# Patient Record
Sex: MALE | Race: WHITE | NOT HISPANIC OR LATINO | URBAN - METROPOLITAN AREA
[De-identification: names, ages, dates, MRNs, and addresses within clinical notes are randomized per-mention and may not be internally consistent; named-entity substitution may affect disease eponyms.]

---

## 2017-11-10 ENCOUNTER — EMERGENCY (EMERGENCY)
Facility: HOSPITAL | Age: 47
LOS: 1 days | Discharge: HOME | End: 2017-11-10
Attending: EMERGENCY MEDICINE | Admitting: EMERGENCY MEDICINE
Payer: COMMERCIAL

## 2017-11-10 VITALS
SYSTOLIC BLOOD PRESSURE: 148 MMHG | OXYGEN SATURATION: 96 % | DIASTOLIC BLOOD PRESSURE: 92 MMHG | WEIGHT: 244.93 LBS | HEART RATE: 95 BPM | RESPIRATION RATE: 15 BRPM | HEIGHT: 75 IN | TEMPERATURE: 98 F

## 2017-11-10 PROCEDURE — 99284 EMERGENCY DEPT VISIT MOD MDM: CPT | Mod: 25

## 2017-11-10 PROCEDURE — 72125 CT NECK SPINE W/O DYE: CPT

## 2017-11-10 PROCEDURE — 70450 CT HEAD/BRAIN W/O DYE: CPT

## 2017-11-10 PROCEDURE — 99284 EMERGENCY DEPT VISIT MOD MDM: CPT

## 2017-11-10 PROCEDURE — 70450 CT HEAD/BRAIN W/O DYE: CPT | Mod: 26

## 2017-11-10 PROCEDURE — 72125 CT NECK SPINE W/O DYE: CPT | Mod: 26

## 2017-11-10 RX ORDER — IBUPROFEN 200 MG
600 TABLET ORAL ONCE
Qty: 0 | Refills: 0 | Status: COMPLETED | OUTPATIENT
Start: 2017-11-10 | End: 2017-11-10

## 2017-11-10 RX ORDER — CYCLOBENZAPRINE HYDROCHLORIDE 10 MG/1
1 TABLET, FILM COATED ORAL
Qty: 20 | Refills: 0 | OUTPATIENT
Start: 2017-11-10

## 2017-11-10 RX ADMIN — Medication 600 MILLIGRAM(S): at 15:43

## 2017-11-10 NOTE — ED PROVIDER NOTE - ENMT, MLM
Airway patent, Nasal mucosa clear. Mouth with normal mucosa. Throat has no vesicles, no oropharyngeal exudates and uvula is midline. no hemotympanum.

## 2017-11-10 NOTE — ED PROVIDER NOTE - CHPI ED SYMPTOMS NEG
no dizziness/no laceration/no loss of consciousness/visual changes, nausea, vomiting, abdominal pain, numbness/tingling
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
## 2017-11-10 NOTE — ED ADULT NURSE NOTE - CHPI ED SYMPTOMS NEG
no pain/no dizziness/no back pain/no crying/no decreased eating/drinking/no difficulty bearing weight/no disorientation/no fussiness/no sleeping issues/no bruising/no laceration/no loss of consciousness/no headache

## 2018-01-03 ENCOUNTER — APPOINTMENT (OUTPATIENT)
Dept: INTERNAL MEDICINE | Facility: CLINIC | Age: 48
End: 2018-01-03
Payer: COMMERCIAL

## 2018-01-03 VITALS
BODY MASS INDEX: 30.59 KG/M2 | HEART RATE: 80 BPM | OXYGEN SATURATION: 97 % | HEIGHT: 75 IN | WEIGHT: 246 LBS | RESPIRATION RATE: 16 BRPM | DIASTOLIC BLOOD PRESSURE: 80 MMHG | SYSTOLIC BLOOD PRESSURE: 112 MMHG | TEMPERATURE: 98.6 F

## 2018-01-03 DIAGNOSIS — M25.561 PAIN IN RIGHT KNEE: ICD-10-CM

## 2018-01-03 DIAGNOSIS — G89.29 PAIN IN RIGHT KNEE: ICD-10-CM

## 2018-01-03 PROCEDURE — 99214 OFFICE O/P EST MOD 30 MIN: CPT

## 2018-01-09 ENCOUNTER — LABORATORY RESULT (OUTPATIENT)
Age: 48
End: 2018-01-09

## 2018-01-12 DIAGNOSIS — E55.9 VITAMIN D DEFICIENCY, UNSPECIFIED: ICD-10-CM

## 2018-01-12 DIAGNOSIS — E86.0 DEHYDRATION: ICD-10-CM

## 2018-01-12 LAB
25(OH)D3 SERPL-MCNC: 15.8 NG/ML
ANION GAP SERPL CALC-SCNC: 14 MMOL/L
B BURGDOR AB SER-IMP: NEGATIVE
B BURGDOR IGM PATRN SER IB-IMP: POSITIVE
B BURGDOR18/20KD IGM SER QL IB: NORMAL
B BURGDOR18KD IGG SER QL IB: NORMAL
B BURGDOR23KD IGG SER QL IB: NORMAL
B BURGDOR23KD IGM SER QL IB: PRESENT
B BURGDOR28KD AB SER QL IB: NORMAL
B BURGDOR28KD IGG SER QL IB: NORMAL
B BURGDOR30KD AB SER QL IB: NORMAL
B BURGDOR30KD IGG SER QL IB: NORMAL
B BURGDOR31KD IGG SER QL IB: NORMAL
B BURGDOR31KD IGM SER QL IB: NORMAL
B BURGDOR39KD IGG SER QL IB: NORMAL
B BURGDOR39KD IGM SER QL IB: PRESENT
B BURGDOR41KD IGG SER QL IB: PRESENT
B BURGDOR41KD IGM SER QL IB: PRESENT
B BURGDOR45KD AB SER QL IB: NORMAL
B BURGDOR45KD IGG SER QL IB: NORMAL
B BURGDOR58KD AB SER QL IB: NORMAL
B BURGDOR58KD IGG SER QL IB: PRESENT
B BURGDOR66KD IGG SER QL IB: PRESENT
B BURGDOR66KD IGM SER QL IB: NORMAL
B BURGDOR93KD IGG SER QL IB: NORMAL
B BURGDOR93KD IGM SER QL IB: NORMAL
BASOPHILS # BLD AUTO: 0.06 K/UL
BASOPHILS NFR BLD AUTO: 0.9 %
BUN SERPL-MCNC: 32 MG/DL
CALCIUM SERPL-MCNC: 9.9 MG/DL
CHLORIDE SERPL-SCNC: 94 MMOL/L
CHOLEST SERPL-MCNC: 210 MG/DL
CHOLEST/HDLC SERPL: 4.5 RATIO
CK SERPL-CCNC: 186 U/L
CO2 SERPL-SCNC: 26 MMOL/L
CREAT SERPL-MCNC: 1.42 MG/DL
EBV EA AB SER IA-ACNC: 11.2 U/ML
EBV EA AB TITR SER IF: POSITIVE
EBV EA IGG SER QL IA: 29 U/ML
EBV EA IGG SER-ACNC: POSITIVE
EBV EA IGM SER IA-ACNC: NEGATIVE
EBV PATRN SPEC IB-IMP: NORMAL
EBV VCA IGG SER IA-ACNC: 356 U/ML
EBV VCA IGM SER QL IA: <10 U/ML
EOSINOPHIL # BLD AUTO: 0.08 K/UL
EOSINOPHIL NFR BLD AUTO: 1.2 %
EPSTEIN-BARR VIRUS CAPSID ANTIGEN IGG: POSITIVE
GLUCOSE SERPL-MCNC: 135 MG/DL
HCT VFR BLD CALC: 38.9 %
HDLC SERPL-MCNC: 47 MG/DL
HGB BLD-MCNC: 13.3 G/DL
IMM GRANULOCYTES NFR BLD AUTO: 0.4 %
IRON SATN MFR SERPL: 51 %
IRON SERPL-MCNC: 149 UG/DL
LDLC SERPL CALC-MCNC: 121 MG/DL
LYMPHOCYTES # BLD AUTO: 2.25 K/UL
LYMPHOCYTES NFR BLD AUTO: 32.9 %
MAN DIFF?: NORMAL
MCHC RBC-ENTMCNC: 32.8 PG
MCHC RBC-ENTMCNC: 34.2 GM/DL
MCV RBC AUTO: 95.8 FL
MONOCYTES # BLD AUTO: 0.5 K/UL
MONOCYTES NFR BLD AUTO: 7.3 %
NEUTROPHILS # BLD AUTO: 3.92 K/UL
NEUTROPHILS NFR BLD AUTO: 57.3 %
PLATELET # BLD AUTO: 222 K/UL
POTASSIUM SERPL-SCNC: 4.9 MMOL/L
RBC # BLD: 4.06 M/UL
RBC # FLD: 12.7 %
SODIUM SERPL-SCNC: 134 MMOL/L
TESTOST BND SERPL-MCNC: 1.6 PG/ML
TESTOST SERPL-MCNC: 89 NG/DL
TIBC SERPL-MCNC: 290 UG/DL
TRIGL SERPL-MCNC: 208 MG/DL
TSH SERPL-ACNC: 1.94 UIU/ML
UIBC SERPL-MCNC: 141 UG/DL
WBC # FLD AUTO: 6.84 K/UL

## 2018-01-12 RX ORDER — ERGOCALCIFEROL 1.25 MG/1
1.25 MG CAPSULE, LIQUID FILLED ORAL
Qty: 8 | Refills: 0 | Status: COMPLETED | COMMUNITY
Start: 1900-01-01 | End: 2018-02-09

## 2018-01-12 RX ORDER — DOXYCYCLINE HYCLATE 100 MG/1
100 TABLET ORAL TWICE DAILY
Qty: 42 | Refills: 0 | Status: COMPLETED | COMMUNITY
Start: 1900-01-01 | End: 2018-02-02

## 2018-02-08 LAB
ANION GAP SERPL CALC-SCNC: 13 MMOL/L
APPEARANCE: CLEAR
BACTERIA: ABNORMAL
BASOPHILS # BLD AUTO: 0.02 K/UL
BASOPHILS NFR BLD AUTO: 0.3 %
BILIRUBIN URINE: NEGATIVE
BLOOD URINE: NEGATIVE
BUN SERPL-MCNC: 12 MG/DL
CALCIUM SERPL-MCNC: 9.5 MG/DL
CHLORIDE SERPL-SCNC: 102 MMOL/L
CO2 SERPL-SCNC: 28 MMOL/L
COLOR: YELLOW
CREAT SERPL-MCNC: 0.93 MG/DL
EOSINOPHIL # BLD AUTO: 0.06 K/UL
EOSINOPHIL NFR BLD AUTO: 0.9 %
FERRITIN SERPL-MCNC: 553 NG/ML
GLUCOSE QUALITATIVE U: NEGATIVE MG/DL
GLUCOSE SERPL-MCNC: 108 MG/DL
HCT VFR BLD CALC: 39.8 %
HGB BLD-MCNC: 13 G/DL
HYALINE CASTS: 1 /LPF
IMM GRANULOCYTES NFR BLD AUTO: 0.3 %
IRON SATN MFR SERPL: 37 %
IRON SERPL-MCNC: 97 UG/DL
KETONES URINE: NEGATIVE
LEUKOCYTE ESTERASE URINE: NEGATIVE
LYMPHOCYTES # BLD AUTO: 2.5 K/UL
LYMPHOCYTES NFR BLD AUTO: 38.3 %
MAN DIFF?: NORMAL
MCHC RBC-ENTMCNC: 31.6 PG
MCHC RBC-ENTMCNC: 32.7 GM/DL
MCV RBC AUTO: 96.6 FL
MICROSCOPIC-UA: NORMAL
MONOCYTES # BLD AUTO: 0.35 K/UL
MONOCYTES NFR BLD AUTO: 5.4 %
NEUTROPHILS # BLD AUTO: 3.58 K/UL
NEUTROPHILS NFR BLD AUTO: 54.8 %
NITRITE URINE: NEGATIVE
PH URINE: 5.5
PLATELET # BLD AUTO: 172 K/UL
POTASSIUM SERPL-SCNC: 4.7 MMOL/L
PROTEIN URINE: NEGATIVE MG/DL
RBC # BLD: 4.12 M/UL
RBC # FLD: 13.1 %
RED BLOOD CELLS URINE: 1 /HPF
SODIUM SERPL-SCNC: 143 MMOL/L
SPECIFIC GRAVITY URINE: 1.02
SQUAMOUS EPITHELIAL CELLS: 0 /HPF
TIBC SERPL-MCNC: 262 UG/DL
UIBC SERPL-MCNC: 165 UG/DL
URINE COMMENTS: NORMAL
UROBILINOGEN URINE: NEGATIVE MG/DL
WBC # FLD AUTO: 6.53 K/UL
WHITE BLOOD CELLS URINE: 1 /HPF

## 2018-02-09 ENCOUNTER — APPOINTMENT (OUTPATIENT)
Dept: INTERNAL MEDICINE | Facility: CLINIC | Age: 48
End: 2018-02-09

## 2018-02-12 ENCOUNTER — LABORATORY RESULT (OUTPATIENT)
Age: 48
End: 2018-02-12

## 2018-02-12 ENCOUNTER — APPOINTMENT (OUTPATIENT)
Dept: INTERNAL MEDICINE | Facility: CLINIC | Age: 48
End: 2018-02-12
Payer: COMMERCIAL

## 2018-02-12 VITALS
BODY MASS INDEX: 29.52 KG/M2 | OXYGEN SATURATION: 99 % | RESPIRATION RATE: 16 BRPM | WEIGHT: 250 LBS | HEART RATE: 90 BPM | SYSTOLIC BLOOD PRESSURE: 130 MMHG | TEMPERATURE: 98 F | HEIGHT: 77 IN | DIASTOLIC BLOOD PRESSURE: 80 MMHG

## 2018-02-12 DIAGNOSIS — F51.01 PRIMARY INSOMNIA: ICD-10-CM

## 2018-02-12 DIAGNOSIS — Z80.9 FAMILY HISTORY OF MALIGNANT NEOPLASM, UNSPECIFIED: ICD-10-CM

## 2018-02-12 DIAGNOSIS — Z78.9 OTHER SPECIFIED HEALTH STATUS: ICD-10-CM

## 2018-02-12 DIAGNOSIS — D64.9 ANEMIA, UNSPECIFIED: ICD-10-CM

## 2018-02-12 DIAGNOSIS — Z80.41 FAMILY HISTORY OF MALIGNANT NEOPLASM OF OVARY: ICD-10-CM

## 2018-02-12 DIAGNOSIS — R23.8 OTHER SKIN CHANGES: ICD-10-CM
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
## 2018-02-12 DIAGNOSIS — Z80.3 FAMILY HISTORY OF MALIGNANT NEOPLASM OF BREAST: ICD-10-CM

## 2018-02-12 LAB — TRANSFERRIN SERPL-MCNC: 214 MG/DL

## 2018-02-12 PROCEDURE — 99214 OFFICE O/P EST MOD 30 MIN: CPT

## 2018-02-12 RX ORDER — LEVOFLOXACIN 500 MG/1
500 TABLET, FILM COATED ORAL
Qty: 7 | Refills: 0 | Status: COMPLETED | COMMUNITY
Start: 2017-09-18

## 2018-02-12 RX ORDER — HYDROCODONE BITARTRATE AND ACETAMINOPHEN 10; 325 MG/1; MG/1
10-325 TABLET ORAL
Qty: 60 | Refills: 0 | Status: COMPLETED | COMMUNITY
Start: 2017-11-13

## 2018-02-12 RX ORDER — VALSARTAN 320 MG/1
320 TABLET, COATED ORAL
Qty: 30 | Refills: 0 | Status: COMPLETED | COMMUNITY
Start: 2017-10-04

## 2018-02-12 RX ORDER — OXYCODONE 10 MG/1
10 TABLET ORAL
Qty: 120 | Refills: 0 | Status: COMPLETED | COMMUNITY
Start: 2017-10-25

## 2018-02-16 LAB
ALBUMIN SERPL ELPH-MCNC: 4.5 G/DL
ALP BLD-CCNC: 80 U/L
ALT SERPL-CCNC: 77 U/L
ANACR T: NEGATIVE
ANAPLASMA PHAGOCYTO IGM COMMENT: NORMAL
ANAPLASMA PHAGOCYTOPHILIA IGG ANTIBODIES: NORMAL
ANAPLASMA PHAGOCYTOPHILIA IGM ANTIBODIES: NORMAL
APTT BLD: 31.9 SEC
AST SERPL-CCNC: 76 U/L
B BURGDOR IGG+IGM SER QL IB: NORMAL
B MICROTI AB SER QL: NORMAL
BABESIA ANTIBODIES, IGG: NORMAL
BABESIA ANTIBODIES, IGM: NORMAL
BILIRUB DIRECT SERPL-MCNC: 0.3 MG/DL
BILIRUB INDIRECT SERPL-MCNC: 0.5 MG/DL
BILIRUB SERPL-MCNC: 0.8 MG/DL
CK SERPL-CCNC: 138 U/L
CRP SERPL-MCNC: 0.4 MG/DL
EHRLICIA CHAFFEENIS IGG ANTIBODIES: NORMAL
EHRLICIA CHAFFEENIS IGG COMMENT: NORMAL
EHRLICIA CHAFFEENIS IGG INTERP: NORMAL
EHRLICIA CHAFFEENIS IGM ANTIBODIES: NORMAL
ERYTHROCYTE [SEDIMENTATION RATE] IN BLOOD BY WESTERGREN METHOD: 15 MM/HR
FOLATE SERPL-MCNC: 10.9 NG/ML
INR PPP: 1.06 RATIO
PROT SERPL-MCNC: 7.3 G/DL
PT BLD: 12 SEC
RHEUMATOID FACT SER QL: 14 IU/ML
TESTOST BND SERPL-MCNC: 3.4 PG/ML
TESTOST SERPL-MCNC: 281.6 NG/DL
TSH SERPL-ACNC: 1.66 UIU/ML
URATE SERPL-MCNC: 8.3 MG/DL
VIT B12 SERPL-MCNC: 515 PG/ML

## 2018-02-21 DIAGNOSIS — K90.0 CELIAC DISEASE: ICD-10-CM

## 2018-03-01 LAB
ANNOTATION COMMENT IMP: NORMAL
HLA-DQ2: POSITIVE
HLA-DQ8 QL: NEGATIVE
REF LAB TEST METHOD: NORMAL

## 2018-03-15 ENCOUNTER — APPOINTMENT (OUTPATIENT)
Dept: UROLOGY | Facility: CLINIC | Age: 48
End: 2018-03-15
Payer: COMMERCIAL

## 2018-03-15 VITALS
DIASTOLIC BLOOD PRESSURE: 75 MMHG | TEMPERATURE: 97.7 F | BODY MASS INDEX: 30.46 KG/M2 | SYSTOLIC BLOOD PRESSURE: 110 MMHG | WEIGHT: 245 LBS | HEART RATE: 74 BPM | HEIGHT: 75 IN

## 2018-03-15 DIAGNOSIS — S89.90XA UNSPECIFIED INJURY OF UNSPECIFIED LOWER LEG, INITIAL ENCOUNTER: ICD-10-CM

## 2018-03-15 PROCEDURE — 99204 OFFICE O/P NEW MOD 45 MIN: CPT

## 2018-03-18 PROBLEM — S89.90XA KNEE INJURY: Status: RESOLVED | Noted: 2018-03-15 | Resolved: 2018-03-18

## 2018-03-23 LAB — PSA SERPL-MCNC: 0.21 NG/ML

## 2018-04-24 ENCOUNTER — APPOINTMENT (OUTPATIENT)
Dept: UROLOGY | Facility: CLINIC | Age: 48
End: 2018-04-24
Payer: COMMERCIAL

## 2018-04-24 PROCEDURE — 96372 THER/PROPH/DIAG INJ SC/IM: CPT

## 2018-04-24 RX ORDER — TESTOSTERONE CYPIONATE 200 MG/ML
200 INJECTION, SOLUTION INTRAMUSCULAR
Refills: 0 | Status: COMPLETED | OUTPATIENT
Start: 2018-04-24

## 2018-05-04 ENCOUNTER — EMERGENCY (EMERGENCY)
Facility: HOSPITAL | Age: 48
LOS: 0 days | Discharge: HOME | End: 2018-05-04
Attending: EMERGENCY MEDICINE
Payer: COMMERCIAL

## 2018-05-04 VITALS
SYSTOLIC BLOOD PRESSURE: 137 MMHG | DIASTOLIC BLOOD PRESSURE: 85 MMHG | RESPIRATION RATE: 16 BRPM | OXYGEN SATURATION: 100 % | TEMPERATURE: 98 F | HEART RATE: 93 BPM

## 2018-05-04 VITALS — WEIGHT: 240.08 LBS | HEIGHT: 75 IN

## 2018-05-04 DIAGNOSIS — Z11.4 ENCOUNTER FOR SCREENING FOR HUMAN IMMUNODEFICIENCY VIRUS [HIV]: ICD-10-CM

## 2018-05-04 DIAGNOSIS — Z88.0 ALLERGY STATUS TO PENICILLIN: ICD-10-CM

## 2018-05-04 DIAGNOSIS — R10.33 PERIUMBILICAL PAIN: ICD-10-CM

## 2018-05-04 DIAGNOSIS — R10.9 UNSPECIFIED ABDOMINAL PAIN: ICD-10-CM

## 2018-05-04 LAB
ALBUMIN SERPL ELPH-MCNC: 3.9 G/DL — SIGNIFICANT CHANGE UP (ref 3.3–5)
ALP SERPL-CCNC: 77 U/L — SIGNIFICANT CHANGE UP (ref 40–120)
ALT FLD-CCNC: 93 U/L — HIGH (ref 12–78)
ANION GAP SERPL CALC-SCNC: 12 MMOL/L — SIGNIFICANT CHANGE UP (ref 5–17)
APTT BLD: 32.5 SEC — SIGNIFICANT CHANGE UP (ref 27.5–37.4)
AST SERPL-CCNC: 107 U/L — HIGH (ref 15–37)
BASOPHILS # BLD AUTO: 0.06 K/UL — SIGNIFICANT CHANGE UP (ref 0–0.2)
BASOPHILS NFR BLD AUTO: 0.8 % — SIGNIFICANT CHANGE UP (ref 0–2)
BILIRUB SERPL-MCNC: 0.6 MG/DL — SIGNIFICANT CHANGE UP (ref 0.2–1.2)
BUN SERPL-MCNC: 20 MG/DL — SIGNIFICANT CHANGE UP (ref 7–23)
CALCIUM SERPL-MCNC: 8.8 MG/DL — SIGNIFICANT CHANGE UP (ref 8.5–10.1)
CHLORIDE SERPL-SCNC: 102 MMOL/L — SIGNIFICANT CHANGE UP (ref 96–108)
CO2 SERPL-SCNC: 26 MMOL/L — SIGNIFICANT CHANGE UP (ref 22–31)
CREAT SERPL-MCNC: 1.23 MG/DL — SIGNIFICANT CHANGE UP (ref 0.5–1.3)
EOSINOPHIL # BLD AUTO: 0.06 K/UL — SIGNIFICANT CHANGE UP (ref 0–0.5)
EOSINOPHIL NFR BLD AUTO: 0.8 % — SIGNIFICANT CHANGE UP (ref 0–6)
GLUCOSE SERPL-MCNC: 111 MG/DL — HIGH (ref 70–99)
HCT VFR BLD CALC: 39.5 % — SIGNIFICANT CHANGE UP (ref 39–50)
HGB BLD-MCNC: 13.4 G/DL — SIGNIFICANT CHANGE UP (ref 13–17)
HIV 1 & 2 AB SERPL IA.RAPID: SIGNIFICANT CHANGE UP
IMM GRANULOCYTES NFR BLD AUTO: 0.4 % — SIGNIFICANT CHANGE UP (ref 0–1.5)
INR BLD: 1.07 RATIO — SIGNIFICANT CHANGE UP (ref 0.88–1.16)
LIDOCAIN IGE QN: 198 U/L — SIGNIFICANT CHANGE UP (ref 73–393)
LYMPHOCYTES # BLD AUTO: 2.31 K/UL — SIGNIFICANT CHANGE UP (ref 1–3.3)
LYMPHOCYTES # BLD AUTO: 31.9 % — SIGNIFICANT CHANGE UP (ref 13–44)
MCHC RBC-ENTMCNC: 32.5 PG — SIGNIFICANT CHANGE UP (ref 27–34)
MCHC RBC-ENTMCNC: 33.9 GM/DL — SIGNIFICANT CHANGE UP (ref 32–36)
MCV RBC AUTO: 95.9 FL — SIGNIFICANT CHANGE UP (ref 80–100)
MONOCYTES # BLD AUTO: 0.65 K/UL — SIGNIFICANT CHANGE UP (ref 0–0.9)
MONOCYTES NFR BLD AUTO: 9 % — SIGNIFICANT CHANGE UP (ref 2–14)
NEUTROPHILS # BLD AUTO: 4.13 K/UL — SIGNIFICANT CHANGE UP (ref 1.8–7.4)
NEUTROPHILS NFR BLD AUTO: 57.1 % — SIGNIFICANT CHANGE UP (ref 43–77)
PLATELET # BLD AUTO: 153 K/UL — SIGNIFICANT CHANGE UP (ref 150–400)
POTASSIUM SERPL-MCNC: 4.1 MMOL/L — SIGNIFICANT CHANGE UP (ref 3.5–5.3)
POTASSIUM SERPL-SCNC: 4.1 MMOL/L — SIGNIFICANT CHANGE UP (ref 3.5–5.3)
PROT SERPL-MCNC: 7.6 GM/DL — SIGNIFICANT CHANGE UP (ref 6–8.3)
PROTHROM AB SERPL-ACNC: 11.6 SEC — SIGNIFICANT CHANGE UP (ref 9.8–12.7)
RBC # BLD: 4.12 M/UL — LOW (ref 4.2–5.8)
RBC # FLD: 12.5 % — SIGNIFICANT CHANGE UP (ref 10.3–14.5)
SODIUM SERPL-SCNC: 140 MMOL/L — SIGNIFICANT CHANGE UP (ref 135–145)
WBC # BLD: 7.24 K/UL — SIGNIFICANT CHANGE UP (ref 3.8–10.5)
WBC # FLD AUTO: 7.24 K/UL — SIGNIFICANT CHANGE UP (ref 3.8–10.5)

## 2018-05-04 PROCEDURE — 99285 EMERGENCY DEPT VISIT HI MDM: CPT | Mod: 25

## 2018-05-04 PROCEDURE — 74177 CT ABD & PELVIS W/CONTRAST: CPT | Mod: 26

## 2018-05-04 RX ORDER — HYDROMORPHONE HYDROCHLORIDE 2 MG/ML
1 INJECTION INTRAMUSCULAR; INTRAVENOUS; SUBCUTANEOUS ONCE
Qty: 0 | Refills: 0 | Status: DISCONTINUED | OUTPATIENT
Start: 2018-05-04 | End: 2018-05-04

## 2018-05-04 RX ORDER — ONDANSETRON 8 MG/1
4 TABLET, FILM COATED ORAL ONCE
Qty: 0 | Refills: 0 | Status: COMPLETED | OUTPATIENT
Start: 2018-05-04 | End: 2018-05-04

## 2018-05-04 RX ORDER — SODIUM CHLORIDE 9 MG/ML
1000 INJECTION INTRAMUSCULAR; INTRAVENOUS; SUBCUTANEOUS ONCE
Qty: 0 | Refills: 0 | Status: COMPLETED | OUTPATIENT
Start: 2018-05-04 | End: 2018-05-04

## 2018-05-04 RX ADMIN — SODIUM CHLORIDE 1000 MILLILITER(S): 9 INJECTION INTRAMUSCULAR; INTRAVENOUS; SUBCUTANEOUS at 19:16

## 2018-05-04 RX ADMIN — HYDROMORPHONE HYDROCHLORIDE 1 MILLIGRAM(S): 2 INJECTION INTRAMUSCULAR; INTRAVENOUS; SUBCUTANEOUS at 19:44

## 2018-05-04 NOTE — ED STATDOCS - ATTENDING CONTRIBUTION TO CARE
I, Leon Bergeron MD,  performed the initial face to face bedside interview with this patient regarding history of present illness, review of symptoms and relevant past medical, social and family history.  I completed an independent physical examination.  I was the initial provider who evaluated this patient. I have signed out the follow up of any pending tests (i.e. labs, radiological studies) to the ACP.  I have communicated the patient’s plan of care and disposition with the ACP.  The history, relevant review of systems, past medical and surgical history, medical decision making, and physical examination was documented by the scribe in my presence and I attest to the accuracy of the documentation.

## 2018-05-04 NOTE — ED STATDOCS - OBJECTIVE STATEMENT
48 y/o M with a PMHx of fatty liver, Lyme's Disease, RA, spine fused, umbilical hernia, testicular hernia (when he was a child) presents to the ED c/o +TTP abd pain for the last month now 2-3 days of tenderness that has been consistent, constant and worsening. Pt notes eating aggravates pain then will follow abd distention, nausea, gagging, vomiting. Pt notes he has had blood test for these Sx with no findings. Pt notes he took Oxy this morning. Denies hematuria, dysuria. 15 mg Oxy morning and 15mg at night although not persistent.  Denies trauma. Denies testicular pain, fevers, chills, CP and SOB. Non-smoker. GI  Surgeon

## 2018-05-04 NOTE — ED ADULT TRIAGE NOTE - CHIEF COMPLAINT QUOTE
Abdominal pain and bloating for 6 weeks. Patient believes it could be from his hernia repair. Denies fever or diarrhea. +vomiting. patient states he took oxycodone this morning for his neck pain he is scheduled for a neck fusion.

## 2018-05-04 NOTE — ED STATDOCS - PROGRESS NOTE DETAILS
signed Maggie Reaves PA-C Pt seen in initially in intake by Dr Bergeron.   Pt c/o abd bloating and pain after eating x 1 month. Worsening pain in periumbilical region x 3-4 days near pts umbilical hernia repair site from several years ago. Vomiting in mornings. Pt TTP periumbilical. Plan labs, CT. Pt declines analgesia initially. Pt agrees with plan of  care. signed Maggie Reaves PA-C   Unclear etiology of pts abdominal pain, does not appear to be infectious or surgical in nature at this time. Recommend outpt f/u with GI and Patane. Pt agrees with plan of  care.

## 2018-05-04 NOTE — ED STATDOCS - GASTROINTESTINAL, MLM
+tender to palpitation to superior umbilicus, periumbilical tenderness, no palpable hernia. no CVA tenderness. +tender to palpitation to superior umbilicus, periumbilical tenderness, no palpable hernia. soft, non-distended, no rebound or guard. no CVA tenderness.

## 2018-05-04 NOTE — ED STATDOCS - MEDICAL DECISION MAKING DETAILS
46 y/o M pmhx of umblical hernia repair x1 month abd distention x2-3 days of significant beronica umblical tenderness to obtain CT abd/pelvis to eval for obstruction, diverticulitis, appendicitis, or other intra abd pathology obtain labs including lipase, U/A and culture give IV fluids and Zofran, patient does not want pain medication at this time.

## 2018-05-14 ENCOUNTER — APPOINTMENT (OUTPATIENT)
Dept: INTERNAL MEDICINE | Facility: CLINIC | Age: 48
End: 2018-05-14
Payer: COMMERCIAL

## 2018-05-14 VITALS
TEMPERATURE: 98.6 F | SYSTOLIC BLOOD PRESSURE: 124 MMHG | WEIGHT: 256 LBS | OXYGEN SATURATION: 97 % | HEART RATE: 70 BPM | HEIGHT: 76 IN | BODY MASS INDEX: 31.17 KG/M2 | DIASTOLIC BLOOD PRESSURE: 80 MMHG | RESPIRATION RATE: 16 BRPM

## 2018-05-14 DIAGNOSIS — M54.12 RADICULOPATHY, CERVICAL REGION: ICD-10-CM

## 2018-05-14 DIAGNOSIS — R53.83 OTHER FATIGUE: ICD-10-CM

## 2018-05-14 DIAGNOSIS — M62.08 SEPARATION OF MUSCLE (NONTRAUMATIC), OTHER SITE: ICD-10-CM

## 2018-05-14 DIAGNOSIS — M25.50 PAIN IN UNSPECIFIED JOINT: ICD-10-CM

## 2018-05-14 DIAGNOSIS — A69.20 LYME DISEASE, UNSPECIFIED: ICD-10-CM

## 2018-05-14 PROCEDURE — 99213 OFFICE O/P EST LOW 20 MIN: CPT

## 2018-05-14 RX ORDER — HYDROCODONE BITARTRATE AND ACETAMINOPHEN 10; 325 MG/1; MG/1
10-325 TABLET ORAL
Refills: 0 | Status: COMPLETED | COMMUNITY
End: 2018-05-14

## 2018-05-14 RX ORDER — VALSARTAN AND HYDROCHLOROTHIAZIDE 320; 25 MG/1; MG/1
320-25 TABLET, FILM COATED ORAL DAILY
Refills: 0 | Status: COMPLETED | COMMUNITY
Start: 2017-10-26 | End: 2018-05-14

## 2018-05-15 ENCOUNTER — APPOINTMENT (OUTPATIENT)
Dept: UROLOGY | Facility: CLINIC | Age: 48
End: 2018-05-15
Payer: COMMERCIAL

## 2018-05-15 PROCEDURE — 96372 THER/PROPH/DIAG INJ SC/IM: CPT

## 2018-05-15 RX ORDER — TESTOSTERONE CYPIONATE 200 MG/ML
200 INJECTION, SOLUTION INTRAMUSCULAR
Refills: 0 | Status: COMPLETED | OUTPATIENT
Start: 2018-05-15

## 2018-05-15 RX ADMIN — TESTOSTERONE CYPIONATE 0 MG/ML: 200 INJECTION INTRAMUSCULAR at 00:00

## 2018-06-01 ENCOUNTER — INPATIENT (INPATIENT)
Facility: HOSPITAL | Age: 48
LOS: 0 days | Discharge: HOME | End: 2018-06-02
Attending: FAMILY MEDICINE | Admitting: FAMILY MEDICINE
Payer: COMMERCIAL

## 2018-06-01 VITALS — HEIGHT: 75 IN | WEIGHT: 250 LBS

## 2018-06-01 DIAGNOSIS — Z96.651 PRESENCE OF RIGHT ARTIFICIAL KNEE JOINT: Chronic | ICD-10-CM

## 2018-06-01 LAB
ALBUMIN SERPL ELPH-MCNC: 4.1 G/DL — SIGNIFICANT CHANGE UP (ref 3.3–5)
ALP SERPL-CCNC: 76 U/L — SIGNIFICANT CHANGE UP (ref 40–120)
ALT FLD-CCNC: 73 U/L — SIGNIFICANT CHANGE UP (ref 12–78)
AMMONIA BLD-MCNC: 15 UMOL/L — SIGNIFICANT CHANGE UP (ref 11–32)
AMPHET UR-MCNC: NEGATIVE — SIGNIFICANT CHANGE UP
ANION GAP SERPL CALC-SCNC: 8 MMOL/L — SIGNIFICANT CHANGE UP (ref 5–17)
APPEARANCE UR: CLEAR — SIGNIFICANT CHANGE UP
APTT BLD: 34.2 SEC — SIGNIFICANT CHANGE UP (ref 27.5–37.4)
AST SERPL-CCNC: 75 U/L — HIGH (ref 15–37)
BACTERIA # UR AUTO: ABNORMAL
BARBITURATES UR SCN-MCNC: NEGATIVE — SIGNIFICANT CHANGE UP
BASOPHILS # BLD AUTO: 0.05 K/UL — SIGNIFICANT CHANGE UP (ref 0–0.2)
BASOPHILS NFR BLD AUTO: 0.6 % — SIGNIFICANT CHANGE UP (ref 0–2)
BENZODIAZ UR-MCNC: NEGATIVE — SIGNIFICANT CHANGE UP
BILIRUB SERPL-MCNC: 1.2 MG/DL — SIGNIFICANT CHANGE UP (ref 0.2–1.2)
BILIRUB UR-MCNC: NEGATIVE — SIGNIFICANT CHANGE UP
BUN SERPL-MCNC: 15 MG/DL — SIGNIFICANT CHANGE UP (ref 7–23)
CALCIUM SERPL-MCNC: 9.3 MG/DL — SIGNIFICANT CHANGE UP (ref 8.5–10.1)
CHLORIDE SERPL-SCNC: 104 MMOL/L — SIGNIFICANT CHANGE UP (ref 96–108)
CO2 SERPL-SCNC: 26 MMOL/L — SIGNIFICANT CHANGE UP (ref 22–31)
COCAINE METAB.OTHER UR-MCNC: NEGATIVE — SIGNIFICANT CHANGE UP
COLOR SPEC: YELLOW — SIGNIFICANT CHANGE UP
COMMENT - URINE: SIGNIFICANT CHANGE UP
CREAT SERPL-MCNC: 1.04 MG/DL — SIGNIFICANT CHANGE UP (ref 0.5–1.3)
DIFF PNL FLD: NEGATIVE — SIGNIFICANT CHANGE UP
EOSINOPHIL # BLD AUTO: 0.04 K/UL — SIGNIFICANT CHANGE UP (ref 0–0.5)
EOSINOPHIL NFR BLD AUTO: 0.5 % — SIGNIFICANT CHANGE UP (ref 0–6)
EPI CELLS # UR: SIGNIFICANT CHANGE UP
ETHANOL SERPL-MCNC: <10 MG/DL — SIGNIFICANT CHANGE UP (ref 0–10)
GLUCOSE SERPL-MCNC: 108 MG/DL — HIGH (ref 70–99)
GLUCOSE UR QL: 100 MG/DL
HCT VFR BLD CALC: 41.9 % — SIGNIFICANT CHANGE UP (ref 39–50)
HGB BLD-MCNC: 14.6 G/DL — SIGNIFICANT CHANGE UP (ref 13–17)
IMM GRANULOCYTES NFR BLD AUTO: 0.4 % — SIGNIFICANT CHANGE UP (ref 0–1.5)
INR BLD: 1.14 RATIO — SIGNIFICANT CHANGE UP (ref 0.88–1.16)
KETONES UR-MCNC: NEGATIVE — SIGNIFICANT CHANGE UP
LEUKOCYTE ESTERASE UR-ACNC: ABNORMAL
LIDOCAIN IGE QN: 880 U/L — HIGH (ref 73–393)
LYMPHOCYTES # BLD AUTO: 1.84 K/UL — SIGNIFICANT CHANGE UP (ref 1–3.3)
LYMPHOCYTES # BLD AUTO: 22.8 % — SIGNIFICANT CHANGE UP (ref 13–44)
MCHC RBC-ENTMCNC: 33.6 PG — SIGNIFICANT CHANGE UP (ref 27–34)
MCHC RBC-ENTMCNC: 34.8 GM/DL — SIGNIFICANT CHANGE UP (ref 32–36)
MCV RBC AUTO: 96.5 FL — SIGNIFICANT CHANGE UP (ref 80–100)
METHADONE UR-MCNC: NEGATIVE — SIGNIFICANT CHANGE UP
MONOCYTES # BLD AUTO: 0.59 K/UL — SIGNIFICANT CHANGE UP (ref 0–0.9)
MONOCYTES NFR BLD AUTO: 7.3 % — SIGNIFICANT CHANGE UP (ref 2–14)
NEUTROPHILS # BLD AUTO: 5.51 K/UL — SIGNIFICANT CHANGE UP (ref 1.8–7.4)
NEUTROPHILS NFR BLD AUTO: 68.4 % — SIGNIFICANT CHANGE UP (ref 43–77)
NITRITE UR-MCNC: NEGATIVE — SIGNIFICANT CHANGE UP
NRBC # BLD: 0 /100 WBCS — SIGNIFICANT CHANGE UP (ref 0–0)
OPIATES UR-MCNC: POSITIVE — SIGNIFICANT CHANGE UP
PCP SPEC-MCNC: SIGNIFICANT CHANGE UP
PCP UR-MCNC: NEGATIVE — SIGNIFICANT CHANGE UP
PH UR: 8 — SIGNIFICANT CHANGE UP (ref 5–8)
PLATELET # BLD AUTO: 147 K/UL — LOW (ref 150–400)
POTASSIUM SERPL-MCNC: 4.3 MMOL/L — SIGNIFICANT CHANGE UP (ref 3.5–5.3)
POTASSIUM SERPL-SCNC: 4.3 MMOL/L — SIGNIFICANT CHANGE UP (ref 3.5–5.3)
PROT SERPL-MCNC: 7.8 GM/DL — SIGNIFICANT CHANGE UP (ref 6–8.3)
PROT UR-MCNC: NEGATIVE MG/DL — SIGNIFICANT CHANGE UP
PROTHROM AB SERPL-ACNC: 12.3 SEC — SIGNIFICANT CHANGE UP (ref 9.8–12.7)
RBC # BLD: 4.34 M/UL — SIGNIFICANT CHANGE UP (ref 4.2–5.8)
RBC # FLD: 13 % — SIGNIFICANT CHANGE UP (ref 10.3–14.5)
RBC CASTS # UR COMP ASSIST: ABNORMAL /HPF (ref 0–4)
SODIUM SERPL-SCNC: 138 MMOL/L — SIGNIFICANT CHANGE UP (ref 135–145)
SP GR SPEC: 1.01 — SIGNIFICANT CHANGE UP (ref 1.01–1.02)
THC UR QL: POSITIVE — SIGNIFICANT CHANGE UP
TSH SERPL-MCNC: 1.5 UU/ML — SIGNIFICANT CHANGE UP (ref 0.36–3.74)
UROBILINOGEN FLD QL: 8 MG/DL
WBC # BLD: 8.06 K/UL — SIGNIFICANT CHANGE UP (ref 3.8–10.5)
WBC # FLD AUTO: 8.06 K/UL — SIGNIFICANT CHANGE UP (ref 3.8–10.5)
WBC UR QL: SIGNIFICANT CHANGE UP

## 2018-06-01 PROCEDURE — 74177 CT ABD & PELVIS W/CONTRAST: CPT | Mod: 26

## 2018-06-01 PROCEDURE — 99285 EMERGENCY DEPT VISIT HI MDM: CPT

## 2018-06-01 PROCEDURE — 71046 X-RAY EXAM CHEST 2 VIEWS: CPT | Mod: 26

## 2018-06-01 PROCEDURE — 93010 ELECTROCARDIOGRAM REPORT: CPT

## 2018-06-01 PROCEDURE — 76705 ECHO EXAM OF ABDOMEN: CPT | Mod: 26

## 2018-06-01 RX ORDER — ENOXAPARIN SODIUM 100 MG/ML
30 INJECTION SUBCUTANEOUS EVERY 24 HOURS
Qty: 0 | Refills: 0 | Status: DISCONTINUED | OUTPATIENT
Start: 2018-06-01 | End: 2018-06-02

## 2018-06-01 RX ORDER — DOCUSATE SODIUM 100 MG
100 CAPSULE ORAL
Qty: 0 | Refills: 0 | Status: DISCONTINUED | OUTPATIENT
Start: 2018-06-01 | End: 2018-06-02

## 2018-06-01 RX ORDER — ZOLPIDEM TARTRATE 10 MG/1
5 TABLET ORAL AT BEDTIME
Qty: 0 | Refills: 0 | Status: DISCONTINUED | OUTPATIENT
Start: 2018-06-01 | End: 2018-06-02

## 2018-06-01 RX ORDER — OXYCODONE HYDROCHLORIDE 5 MG/1
0 TABLET ORAL
Qty: 0 | Refills: 0 | COMMUNITY

## 2018-06-01 RX ORDER — POLYETHYLENE GLYCOL 3350 17 G/17G
17 POWDER, FOR SOLUTION ORAL
Qty: 0 | Refills: 0 | Status: DISCONTINUED | OUTPATIENT
Start: 2018-06-01 | End: 2018-06-02

## 2018-06-01 RX ORDER — SODIUM CHLORIDE 9 MG/ML
1000 INJECTION INTRAMUSCULAR; INTRAVENOUS; SUBCUTANEOUS ONCE
Qty: 0 | Refills: 0 | Status: COMPLETED | OUTPATIENT
Start: 2018-06-01 | End: 2018-06-01

## 2018-06-01 RX ORDER — OXYCODONE HYDROCHLORIDE 5 MG/1
15 TABLET ORAL
Qty: 0 | Refills: 0 | Status: DISCONTINUED | OUTPATIENT
Start: 2018-06-01 | End: 2018-06-02

## 2018-06-01 RX ORDER — OXYCODONE HYDROCHLORIDE 5 MG/1
15 TABLET ORAL
Qty: 0 | Refills: 0 | Status: DISCONTINUED | OUTPATIENT
Start: 2018-06-01 | End: 2018-06-01

## 2018-06-01 RX ORDER — ACETAMINOPHEN 500 MG
650 TABLET ORAL EVERY 6 HOURS
Qty: 0 | Refills: 0 | Status: DISCONTINUED | OUTPATIENT
Start: 2018-06-01 | End: 2018-06-02

## 2018-06-01 RX ORDER — VALSARTAN 80 MG/1
320 TABLET ORAL DAILY
Qty: 0 | Refills: 0 | Status: DISCONTINUED | OUTPATIENT
Start: 2018-06-01 | End: 2018-06-02

## 2018-06-01 RX ORDER — PANTOPRAZOLE SODIUM 20 MG/1
40 TABLET, DELAYED RELEASE ORAL
Qty: 0 | Refills: 0 | Status: DISCONTINUED | OUTPATIENT
Start: 2018-06-01 | End: 2018-06-02

## 2018-06-01 RX ADMIN — SODIUM CHLORIDE 2000 MILLILITER(S): 9 INJECTION INTRAMUSCULAR; INTRAVENOUS; SUBCUTANEOUS at 15:00

## 2018-06-01 RX ADMIN — POLYETHYLENE GLYCOL 3350 17 GRAM(S): 17 POWDER, FOR SOLUTION ORAL at 23:58

## 2018-06-01 RX ADMIN — ZOLPIDEM TARTRATE 5 MILLIGRAM(S): 10 TABLET ORAL at 23:58

## 2018-06-01 RX ADMIN — SODIUM CHLORIDE 500 MILLILITER(S): 9 INJECTION INTRAMUSCULAR; INTRAVENOUS; SUBCUTANEOUS at 18:12

## 2018-06-01 RX ADMIN — ENOXAPARIN SODIUM 30 MILLIGRAM(S): 100 INJECTION SUBCUTANEOUS at 23:58

## 2018-06-01 NOTE — H&P ADULT - NSHPPHYSICALEXAM_GEN_ALL_CORE
ICU Vital Signs Last 24 Hrs    T(F): 97.6 (01 Jun 2018 17:08), Max: 98 (01 Jun 2018 13:58)  HR: 63 (01 Jun 2018 17:08) (63 - 70)  BP: 157/97 (01 Jun 2018 17:08) (153/99 - 157/97)    RR: 16 (01 Jun 2018 17:08) (16 - 16)  SpO2: 100% (01 Jun 2018 17:08) (100% - 100%)

## 2018-06-01 NOTE — H&P ADULT - NSHPSOCIALHISTORY_GEN_ALL_CORE
Pt lives with his wife abd son.  He reports 2 cocktails , 4-5 times a week.  He reports occas marijuana w/vape inhalation.

## 2018-06-01 NOTE — ED STATDOCS - MEDICAL DECISION MAKING DETAILS
48 y/o male presents with abd pain and decreased appetite for 1 month, plan for US of abd, labs and hydration.

## 2018-06-01 NOTE — ED STATDOCS - PMH
GERD (gastroesophageal reflux disease)    HTN (hypertension)    Lyme disease    RA (rheumatoid arthritis)    Umbilical hernia

## 2018-06-01 NOTE — H&P ADULT - ASSESSMENT
Pt is a 46 y/o gentleman with a PMHx of umbilical hernia with mesh 3 years ago by Dr. Starks, Lyme disease, HTN, GERD and RA presents to the ED c/o constant abd pain and distention for past month.   He reports decreased appetite,  inability to take po the last few days.   Also reports no bm for a few days,  then he took Mag citrate yesterday and had a watery stool.  He also reports it takes some time to urinate.   He denies fever, urinary burning or pain.  Pt had a CAT scan of abd 1 month ago with insignificant findings. Occasional ETOH use. He notes he is having a spinal fusion surgery in a few weeks. No hx of ulcers.     He is scheduled for colonoscopy and endoscopy with GI Dr. Damon at the end of August 30th.       Pt is admitted w/    I. Abd pain, mid abd and distension, inability to take po; elevated lipase  - CT and US abd  - GI Dr. Damon    II. Fatty Liver  - cessation of ETOH use    III. DVT prophylaxis  - lovenox Pt is a 46 y/o gentleman with a PMHx of umbilical hernia with mesh 3 years ago by Dr. Starks, Lyme disease, HTN, GERD and RA presents to the ED c/o constant abd pain and distention for past month.   He reports decreased appetite,  inability to take po the last few days.   Also reports no bm for a few days,  then he took Mag citrate yesterday and had a watery stool.  He also reports it takes some time to urinate.   He denies fever, urinary burning or pain.  Pt had a CAT scan of abd 1 month ago with insignificant findings. Occasional ETOH use. He notes he is having a spinal fusion surgery in a few weeks. No hx of ulcers.     He is scheduled for colonoscopy and endoscopy with GI Dr. Damon at the end of August 30th.       Pt is admitted w/    I. Abd pain, mid abd and distension, inability to take po; elevated lipase , constipation?  - CT and US abd done  - IVF  - GI Dr. Damon  - Surg consult Dr. Starks    II. Fatty Liver  - cessation of ETOH use    III. Sleep Apnea  - CPAP w/nasal mask    IV. Marijuana cessation    V. DVT prophylaxis  - lovenox Pt is a 48 y/o gentleman with a PMHx of umbilical hernia with mesh 3 years ago by Dr. Starks, Lyme disease, HTN, GERD and RA presents to the ED c/o constant abd pain and distention for past month.   He reports decreased appetite,  inability to take po the last few days.   Also reports no bm for a few days,  then he took Mag citrate yesterday and had a watery stool.  He also reports it takes some time to urinate.   He denies fever, urinary burning or pain.  Pt had a CAT scan of abd 1 month ago with insignificant findings. Occasional ETOH use. He notes he is having a spinal fusion surgery in a few weeks. No hx of ulcers.     He is scheduled for colonoscopy and endoscopy with GI Dr. Damon at the end of August 30th.     Pt is admitted w/    I. Abd pain, mid abd and distension, inability to take po,  pain in area of prior hernia and mesh? ; elevated lipase , constipation?   - CT and US abd done  - IVF  - GI Dr. Damon  - Surg consult Dr. Starks    II. Fatty Liver  - cessation of ETOH use, pt counseled    III. Sleep Apnea  - CPAP w/nasal mask    IV. Marijuana cessation  - pt counseled    V. DVT prophylaxis  - lovenox

## 2018-06-01 NOTE — ED STATDOCS - PROGRESS NOTE DETAILS
signed Maggie Reaves PA-C Pt seen initially in intake by Dr Temple.   Pt c/o abd pain and bloating x 1.5 mos, seen in ED for same last month, had negative bloodwork and CT.  Pt saw MIGUEL ÁNGEL Dobbs as outpt, negative gluten sensitivity test, scheduled for outpt endoscopy in August, also had f/u appt with surgeon Dr Melendez regarding his umbilicial hernia mesh which is intact. Pt alert, appears  anxious and slightly tremulous. Denies frequent EtOH use. Abd mildly tender and distended. no rebound or guarding. Plan labs and sono. Pt agrees with plan of  care. signed Maggie Reaves PA-C   elevated lipase on labs, fatty liver on sono. Will admit pt for pancreatitis. Pt agrees with admission and plan of care. Hospitalist paged. signed Maggie Reaves PA-C   elevated lipase on labs, fatty liver on sono. Will admit pt for pancreatitis. Pt agrees with admission and plan of care. Hospitalist yanid. FELIBERTO Cai. signed Maggie Reaves PA-C   Spoke to hospitalist Dr Rick, may admit pt to his service, will f/u on CT abd/pelvis. Pt agrees with plan of  care.

## 2018-06-01 NOTE — ED ADULT NURSE REASSESSMENT NOTE - NS ED NURSE REASSESS COMMENT FT1
Bladder scan completed at bedside per MD orders. Resutls = 34 ml, 0 ml, 34 ml. Pt denies urge to void at this time. Bladder nonpalpable.

## 2018-06-01 NOTE — ED STATDOCS - OBJECTIVE STATEMENT
48 y/o male with a PMHx of umbilical hernia with mesh 3 years ago by Dr. Starks, Lyme disease, HTN, GERD and RA presents to the ED c/o constant abd pain and distention for past month. +decreased appetite. Scheduled colonoscopy and endoscopy with GI Dr. Damon at the end of August, pt states he cannot wait that long. Denies fever, diarrhea or changes in bowel movements. Pt had a CAT scan of abd 1 month ago with insignificant findings. Occasional ETOH use. He notes he is having a spinal fusion surgery in a few weeks. No hx of ulcers. GI Dr. Damon.

## 2018-06-01 NOTE — ED ADULT TRIAGE NOTE - CHIEF COMPLAINT QUOTE
pt c/o diffuse abd pain and swelling for the past 1.5 months. denies n/v/d or fever. states hx of abdominal mesh. pt was told by previous ED that mesh is not the cause of pain, but has not been able to identify cause of pain. pain does not change with position, pt notes normal bowel habits.

## 2018-06-01 NOTE — ED STATDOCS - NS_ ATTENDINGSCRIBEDETAILS _ED_A_ED_FT
I, Darryl Temple MD,  performed the initial face to face bedside interview with this patient regarding history of present illness, review of symptoms and relevant past medical, social and family history.  I completed an independent physical examination.    The history, relevant review of systems, past medical and surgical history, medical decision making, and physical examination was documented by the scribe in my presence and I attest to the accuracy of the documentation.

## 2018-06-01 NOTE — H&P ADULT - PMH
GERD (gastroesophageal reflux disease)    HTN (hypertension)    Lyme disease    RA (rheumatoid arthritis)    Umbilical hernia GERD (gastroesophageal reflux disease)    HTN (hypertension)    Lyme disease    RA (rheumatoid arthritis)    Sleep apnea, unspecified type    Umbilical hernia

## 2018-06-01 NOTE — H&P ADULT - HISTORY OF PRESENT ILLNESS
Pt is a 46 y/o gentleman with a PMHx of umbilical hernia with mesh 3 years ago by Dr. Starks, Lyme disease, HTN, GERD and RA presents to the ED c/o constant abd pain and distention for past month.   He reports decreased appetite,  inability to take po the last few days.   Also reports no bm for a few days,  then he took Mag citrate yesterday and had a watery stool.  He also reports it takes some time to urinate.   He denies fever, urinary burning or pain.  Pt had a CAT scan of abd 1 month ago with insignificant findings. Occasional ETOH use. He notes he is having a spinal fusion surgery in a few weeks. No hx of ulcers.     He is scheduled for colonoscopy and endoscopy with GI Dr. Damon at the end of August 30th. Pt is a 48 y/o gentleman with a PMHx  umbilical hernia with mesh 3 years ago by Dr. Starks, Lyme disease, HTN, GERD and RA presents to the ED c/o constant abd pain and distention for past month.   He reports decreased appetite,  inability to take po the last few days.   Also reports no bm for a few days,  then he took Mag citrate yesterday and had a watery stool.  He also reports it takes some time to urinate.   He denies fever, urinary burning or pain.  Pt had a CAT scan of abd 1 month ago with insignificant findings. Occasional ETOH use. He notes he is having a spinal fusion surgery in a few weeks. No hx of ulcers.     He is scheduled for colonoscopy and endoscopy with GI Dr. Damon at the end of August 30th.

## 2018-06-01 NOTE — ED STATDOCS - ATTENDING CONTRIBUTION TO CARE
I, Darryl Temple MD,  performed the initial face to face bedside interview with this patient regarding history of present illness, review of symptoms and relevant past medical, social and family history.  I completed an independent physical examination.  I was the initial provider who evaluated this patient. I have signed out the follow up of any pending tests (i.e. labs, radiological studies) to the ACP.  I have communicated the patient’s plan of care and disposition with the ACP.

## 2018-06-02 VITALS
RESPIRATION RATE: 18 BRPM | OXYGEN SATURATION: 99 % | DIASTOLIC BLOOD PRESSURE: 77 MMHG | TEMPERATURE: 98 F | SYSTOLIC BLOOD PRESSURE: 128 MMHG | HEART RATE: 62 BPM

## 2018-06-02 DIAGNOSIS — Z98.1 ARTHRODESIS STATUS: Chronic | ICD-10-CM

## 2018-06-02 LAB
ANION GAP SERPL CALC-SCNC: 6 MMOL/L — SIGNIFICANT CHANGE UP (ref 5–17)
BUN SERPL-MCNC: 14 MG/DL — SIGNIFICANT CHANGE UP (ref 7–23)
CALCIUM SERPL-MCNC: 8.2 MG/DL — LOW (ref 8.5–10.1)
CHLORIDE SERPL-SCNC: 105 MMOL/L — SIGNIFICANT CHANGE UP (ref 96–108)
CO2 SERPL-SCNC: 29 MMOL/L — SIGNIFICANT CHANGE UP (ref 22–31)
CREAT SERPL-MCNC: 0.94 MG/DL — SIGNIFICANT CHANGE UP (ref 0.5–1.3)
GLUCOSE SERPL-MCNC: 85 MG/DL — SIGNIFICANT CHANGE UP (ref 70–99)
LIDOCAIN IGE QN: 665 U/L — HIGH (ref 73–393)
POTASSIUM SERPL-MCNC: 4.1 MMOL/L — SIGNIFICANT CHANGE UP (ref 3.5–5.3)
POTASSIUM SERPL-SCNC: 4.1 MMOL/L — SIGNIFICANT CHANGE UP (ref 3.5–5.3)
SODIUM SERPL-SCNC: 140 MMOL/L — SIGNIFICANT CHANGE UP (ref 135–145)

## 2018-06-02 RX ADMIN — VALSARTAN 320 MILLIGRAM(S): 80 TABLET ORAL at 06:05

## 2018-06-02 RX ADMIN — ZOLPIDEM TARTRATE 5 MILLIGRAM(S): 10 TABLET ORAL at 00:14

## 2018-06-02 NOTE — PATIENT PROFILE ADULT. - PMH
Chronic back pain    GERD (gastroesophageal reflux disease)    HTN (hypertension)    Lyme disease    RA (rheumatoid arthritis)    Sleep apnea, unspecified type    Umbilical hernia

## 2018-06-02 NOTE — PROVIDER CONTACT NOTE (OTHER) - BACKGROUND
Pt. seen by GI, unhappy with plan, dressed self and removed IV from right forearm. Stated he didn't want to wait for discharge papers and stated "it's nothing against you guys the hospital is telling

## 2018-06-02 NOTE — CONSULT NOTE ADULT - SUBJECTIVE AND OBJECTIVE BOX
Patient is a 47y old  Male who presents with a chief complaint of Abdominal pain/distention (02 Jun 2018 00:36)      HPI:  Pt is a 48 y/o gentleman with a PMHx  umbilical hernia with mesh 3 years ago by Dr. Starks, Lyme disease, HTN, GERD and RA presents to the ED c/o constant abd pain and distention for past month.   He reports decreased appetite,  inability to take po the last few days.   Also reports no bm for a few days,  then he took Mag citrate yesterday and had a watery stool.  He also reports it takes some time to urinate.   He denies fever, urinary burning or pain.  Pt had a CAT scan of abd 1 month ago with insignificant findings. Occasional ETOH use. He notes he is having a spinal fusion surgery in a few weeks. No hx of ulcers.     He is scheduled for colonoscopy and endoscopy with GI Dr. Damon at the end of August 30th. (01 Jun 2018 19:27)    Admission CT is normal. No weight loss.       PAST MEDICAL & SURGICAL HISTORY:  Chronic back pain  Sleep apnea, unspecified type  RA (rheumatoid arthritis)  Lyme disease  H/O spinal fusion: 2014  History of total right knee replacement      MEDICATIONS  (STANDING):  docusate sodium 100 milliGRAM(s) Oral two times a day  enoxaparin Injectable 30 milliGRAM(s) SubCutaneous every 24 hours  pantoprazole    Tablet 40 milliGRAM(s) Oral before breakfast  polyethylene glycol 3350 17 Gram(s) Oral two times a day  valsartan 320 milliGRAM(s) Oral daily    MEDICATIONS  (PRN):  acetaminophen   Tablet. 650 milliGRAM(s) Oral every 6 hours PRN Mild Pain (1 - 3)  oxyCODONE    IR 15 milliGRAM(s) Oral <User Schedule> PRN Moderate Pain (4 - 6)  zolpidem 5 milliGRAM(s) Oral at bedtime PRN Insomnia  zolpidem 5 milliGRAM(s) Oral at bedtime PRN Insomnia      Allergies    penicillins (Unknown)    Intolerances        SOCIAL HISTORY:    FAMILY HISTORY:  Family history of hypertension (Father): parent      REVIEW OF SYSTEMS:      Vital Signs Last 24 Hrs  T(C): 36.5 (02 Jun 2018 05:03), Max: 36.8 (01 Jun 2018 22:00)  T(F): 97.7 (02 Jun 2018 05:03), Max: 98.3 (01 Jun 2018 22:00)  HR: 62 (02 Jun 2018 05:03) (58 - 70)  BP: 128/77 (02 Jun 2018 05:03) (128/77 - 157/97)  BP(mean): --  RR: 18 (02 Jun 2018 05:03) (16 - 18)  SpO2: 99% (02 Jun 2018 05:03) (99% - 100%)    PHYSICAL EXAM:    HEENT: MMM  Neck: No LAD  Respiratory: CTAB  Cardiovascular: S1 and S2, RRR, no M/G/R  Gastrointestinal: BS+, soft, minimally distended    LABS:                        14.6   8.06  )-----------( 147      ( 01 Jun 2018 14:53 )             41.9     06-02    140  |  105  |  14  ----------------------------<  85  4.1   |  29  |  0.94    Ca    8.2<L>      02 Jun 2018 07:47    TPro  7.8  /  Alb  4.1  /  TBili  1.2  /  DBili  x   /  AST  75<H>  /  ALT  73  /  AlkPhos  76  06-01    PT/INR - ( 01 Jun 2018 14:53 )   PT: 12.3 sec;   INR: 1.14 ratio         PTT - ( 01 Jun 2018 14:53 )  PTT:34.2 sec  LIVER FUNCTIONS - ( 01 Jun 2018 14:53 )  Alb: 4.1 g/dL / Pro: 7.8 gm/dL / ALK PHOS: 76 U/L / ALT: 73 U/L / AST: 75 U/L / GGT: x             RADIOLOGY & ADDITIONAL STUDIES:

## 2018-06-02 NOTE — CONSULT NOTE ADULT - ASSESSMENT
46 yo male with history of abdominal distension, with multiple negative CT scans. Patient is stable for discharge. Will arrange outpatient evaluation.

## 2018-06-06 DIAGNOSIS — K21.9 GASTRO-ESOPHAGEAL REFLUX DISEASE WITHOUT ESOPHAGITIS: ICD-10-CM

## 2018-06-06 DIAGNOSIS — R14.0 ABDOMINAL DISTENSION (GASEOUS): ICD-10-CM

## 2018-06-06 DIAGNOSIS — M54.9 DORSALGIA, UNSPECIFIED: ICD-10-CM

## 2018-06-06 DIAGNOSIS — M06.9 RHEUMATOID ARTHRITIS, UNSPECIFIED: ICD-10-CM

## 2018-06-06 DIAGNOSIS — K76.0 FATTY (CHANGE OF) LIVER, NOT ELSEWHERE CLASSIFIED: ICD-10-CM

## 2018-06-06 DIAGNOSIS — Z88.0 ALLERGY STATUS TO PENICILLIN: ICD-10-CM

## 2018-06-06 DIAGNOSIS — G89.29 OTHER CHRONIC PAIN: ICD-10-CM

## 2018-06-06 DIAGNOSIS — Z72.89 OTHER PROBLEMS RELATED TO LIFESTYLE: ICD-10-CM

## 2018-06-06 DIAGNOSIS — G47.30 SLEEP APNEA, UNSPECIFIED: ICD-10-CM

## 2018-06-06 DIAGNOSIS — I10 ESSENTIAL (PRIMARY) HYPERTENSION: ICD-10-CM

## 2018-06-07 ENCOUNTER — APPOINTMENT (OUTPATIENT)
Dept: UROLOGY | Facility: CLINIC | Age: 48
End: 2018-06-07
Payer: COMMERCIAL

## 2018-06-07 VITALS
DIASTOLIC BLOOD PRESSURE: 84 MMHG | BODY MASS INDEX: 31.17 KG/M2 | SYSTOLIC BLOOD PRESSURE: 146 MMHG | HEIGHT: 76 IN | WEIGHT: 256 LBS

## 2018-06-07 PROCEDURE — 96372 THER/PROPH/DIAG INJ SC/IM: CPT

## 2018-06-07 RX ORDER — TESTOSTERONE CYPIONATE 200 MG/ML
200 INJECTION, SOLUTION INTRAMUSCULAR
Refills: 0 | Status: COMPLETED | OUTPATIENT
Start: 2018-06-07

## 2018-06-07 RX ORDER — POLYETHYLENE GLYCOL 3350, SODIUM SULFATE, SODIUM CHLORIDE, POTASSIUM CHLORIDE, ASCORBIC ACID, SODIUM ASCORBATE 7.5-2.691G
100 KIT ORAL
Qty: 1 | Refills: 0 | Status: COMPLETED | COMMUNITY
Start: 2018-05-15

## 2018-06-07 RX ORDER — AZITHROMYCIN 250 MG/1
250 TABLET, FILM COATED ORAL
Qty: 6 | Refills: 0 | Status: COMPLETED | COMMUNITY
Start: 2018-03-19

## 2018-06-10 PROBLEM — K21.9 GASTRO-ESOPHAGEAL REFLUX DISEASE WITHOUT ESOPHAGITIS: Chronic | Status: INACTIVE | Noted: 2018-06-01 | Resolved: 2018-06-09

## 2018-06-10 PROBLEM — K42.9 UMBILICAL HERNIA WITHOUT OBSTRUCTION OR GANGRENE: Chronic | Status: INACTIVE | Noted: 2018-06-01 | Resolved: 2018-06-09

## 2018-06-10 PROBLEM — I10 ESSENTIAL (PRIMARY) HYPERTENSION: Chronic | Status: INACTIVE | Noted: 2018-06-01 | Resolved: 2018-06-09

## 2018-06-21 ENCOUNTER — APPOINTMENT (OUTPATIENT)
Dept: UROLOGY | Facility: CLINIC | Age: 48
End: 2018-06-21
Payer: COMMERCIAL

## 2018-06-21 DIAGNOSIS — F52.21 MALE ERECTILE DISORDER: ICD-10-CM

## 2018-06-21 PROCEDURE — 96372 THER/PROPH/DIAG INJ SC/IM: CPT

## 2018-07-20 ENCOUNTER — MEDICATION RENEWAL (OUTPATIENT)
Age: 48
End: 2018-07-20

## 2018-07-24 ENCOUNTER — MEDICATION RENEWAL (OUTPATIENT)
Age: 48
End: 2018-07-24

## 2018-09-26 ENCOUNTER — MEDICATION RENEWAL (OUTPATIENT)
Age: 48
End: 2018-09-26

## 2018-11-01 ENCOUNTER — APPOINTMENT (OUTPATIENT)
Dept: INTERNAL MEDICINE | Facility: CLINIC | Age: 48
End: 2018-11-01
Payer: COMMERCIAL

## 2018-11-01 VITALS
OXYGEN SATURATION: 95 % | SYSTOLIC BLOOD PRESSURE: 112 MMHG | HEIGHT: 76 IN | HEART RATE: 84 BPM | TEMPERATURE: 97.9 F | DIASTOLIC BLOOD PRESSURE: 62 MMHG | WEIGHT: 250 LBS | BODY MASS INDEX: 30.44 KG/M2 | RESPIRATION RATE: 18 BRPM

## 2018-11-01 DIAGNOSIS — M50.90 CERVICAL DISC DISORDER, UNSPECIFIED, UNSPECIFIED CERVICAL REGION: ICD-10-CM

## 2018-11-01 DIAGNOSIS — M54.9 DORSALGIA, UNSPECIFIED: ICD-10-CM

## 2018-11-01 PROCEDURE — 90686 IIV4 VACC NO PRSV 0.5 ML IM: CPT

## 2018-11-01 PROCEDURE — G0008: CPT

## 2018-11-01 PROCEDURE — 99214 OFFICE O/P EST MOD 30 MIN: CPT | Mod: 25

## 2018-11-01 RX ORDER — VALSARTAN 320 MG/1
320 TABLET ORAL
Refills: 0 | Status: DISCONTINUED | COMMUNITY
End: 2018-11-01

## 2018-11-01 RX ORDER — TADALAFIL 20 MG/1
20 TABLET, FILM COATED ORAL
Qty: 6 | Refills: 0 | Status: DISCONTINUED | COMMUNITY
Start: 2017-02-03 | End: 2018-11-01

## 2018-11-01 NOTE — HISTORY OF PRESENT ILLNESS
[de-identified] : Patient comes in today for a followup evaluation. There are several issues to discuss.\par \par At this time, the patient states that he is fairly stable. He continues to have issues with chronic back pain. He did undergo a cervical epidural injection which helped the paresthesias in his arms, but it did not help relieve the cervical pain in the neck.\par \par The patient was noted to have hypotension and is him. He was seen in urology consultation by Dr. Kingsley. He is now receiving testosterone repletion twice a month. Since starting the medication he has noted that his energy levels and his libido have both increased significantly.\par \par The patient was noted to have abnormal liver function tests. His abdominal pain has resolved. He did not undergo GI followup with regards to undergoing a colonoscopy nor the endoscopy. He has not followed up in this regard.\par \par The patient remains disabled. He has been exercising by walking and performing light calisthenics at home. He now comes in for this assessment.

## 2018-11-01 NOTE — PLAN
[FreeTextEntry1] : 1. Continued medication as outlined above.\par \par 2. We'll now switch from Diovan (due to the recent recall), 2 losartan 100 mg daily for treatment of his retention.\par \par 3. Flu shot\par \par 4. The patient will see Dr. Henry for a psychiatric evaluation. He made this a self-referral.\par \par 5. Continue his exercise regimen\par \par 6. The patient will schedule an appointment with Dr. Regan. He needs to undergo a baseline screening colonoscopy as he is now 48. We'll also need to followup with his abnormal liver function tests. He may need an upper endoscopy as well.\par \par 7. Follow up with myself in 6 months with a wellness evaluation and all yearly routine fasting blood work.

## 2018-11-01 NOTE — PHYSICAL EXAM
[General Appearance - Alert] : alert [General Appearance - In No Acute Distress] : in no acute distress [General Appearance - Well Developed] : well developed [General Appearance - Well-Appearing] : healthy appearing [Outer Ear] : the ears and nose were normal in appearance [Hearing Threshold Finger Rub Not [place]] : hearing was normal [Examination Of The Oral Cavity] : the lips and gums were normal [Nasal Cavity] : the nasal mucosa and septum were normal [Neck Appearance] : the appearance of the neck was normal [Neck Cervical Mass (___cm)] : no neck mass was observed [Jugular Venous Distention Increased] : there was no jugular-venous distention [Thyroid Diffuse Enlargement] : the thyroid was not enlarged [] : no respiratory distress [Respiration, Rhythm And Depth] : normal respiratory rhythm and effort [Exaggerated Use Of Accessory Muscles For Inspiration] : no accessory muscle use [Auscultation Breath Sounds / Voice Sounds] : lungs were clear to auscultation bilaterally [Heart Rate And Rhythm] : heart rate was normal and rhythm regular [Heart Sounds] : normal S1 and S2 [Heart Sounds Gallop] : no gallops [Systolic grade ___/6] : A grade [unfilled]/6 systolic murmur was heard. [Edema] : there was no peripheral edema [Veins - Varicosity Changes] : there were no varicosital changes [Abdomen Soft] : soft [Abdomen Tenderness] : non-tender [Cervical Lymph Nodes Enlarged Anterior Bilaterally] : anterior cervical [Supraclavicular Lymph Nodes Enlarged Bilaterally] : supraclavicular [Oriented To Time, Place, And Person] : oriented to person, place, and time [Impaired Insight] : insight and judgment were intact [Affect] : the affect was normal [FreeTextEntry1] : anxious

## 2018-11-02 PROBLEM — M06.9 RHEUMATOID ARTHRITIS, UNSPECIFIED: Chronic | Status: ACTIVE | Noted: 2018-05-05

## 2018-11-02 PROBLEM — M54.9 DORSALGIA, UNSPECIFIED: Chronic | Status: ACTIVE | Noted: 2018-06-02

## 2018-11-02 PROBLEM — K42.9 UMBILICAL HERNIA WITHOUT OBSTRUCTION OR GANGRENE: Chronic | Status: ACTIVE | Noted: 2018-06-09

## 2018-11-02 PROBLEM — K21.9 GASTRO-ESOPHAGEAL REFLUX DISEASE WITHOUT ESOPHAGITIS: Chronic | Status: ACTIVE | Noted: 2018-06-09

## 2018-11-02 PROBLEM — I10 ESSENTIAL (PRIMARY) HYPERTENSION: Chronic | Status: ACTIVE | Noted: 2018-06-09

## 2018-11-02 PROBLEM — G47.30 SLEEP APNEA, UNSPECIFIED: Chronic | Status: ACTIVE | Noted: 2018-06-01

## 2018-11-02 PROBLEM — A69.20 LYME DISEASE, UNSPECIFIED: Chronic | Status: ACTIVE | Noted: 2018-05-05

## 2018-11-14 ENCOUNTER — MED ADMIN CHARGE (OUTPATIENT)
Age: 48
End: 2018-11-14

## 2018-11-19 ENCOUNTER — MEDICATION RENEWAL (OUTPATIENT)
Age: 48
End: 2018-11-19

## 2019-01-03 ENCOUNTER — APPOINTMENT (OUTPATIENT)
Dept: UROLOGY | Facility: CLINIC | Age: 49
End: 2019-01-03
Payer: COMMERCIAL

## 2019-01-03 PROCEDURE — 99213 OFFICE O/P EST LOW 20 MIN: CPT

## 2019-02-05 ENCOUNTER — MEDICATION RENEWAL (OUTPATIENT)
Age: 49
End: 2019-02-05

## 2019-03-08 ENCOUNTER — LABORATORY RESULT (OUTPATIENT)
Age: 49
End: 2019-03-08

## 2019-03-20 ENCOUNTER — MEDICATION RENEWAL (OUTPATIENT)
Age: 49
End: 2019-03-20

## 2019-03-20 LAB
ALBUMIN SERPL ELPH-MCNC: 5.2 G/DL
ALP BLD-CCNC: 79 U/L
ALT SERPL-CCNC: 100 U/L
AST SERPL-CCNC: 97 U/L
BASOPHILS # BLD AUTO: 0.09 K/UL
BASOPHILS NFR BLD AUTO: 0.9 %
BILIRUB DIRECT SERPL-MCNC: 0.6 MG/DL
BILIRUB INDIRECT SERPL-MCNC: 1.3 MG/DL
BILIRUB SERPL-MCNC: 1.9 MG/DL
CHOLEST SERPL-MCNC: 241 MG/DL
CHOLEST/HDLC SERPL: 3.5 RATIO
EOSINOPHIL # BLD AUTO: 0.02 K/UL
EOSINOPHIL NFR BLD AUTO: 0.2 %
HCT VFR BLD CALC: 52.8 %
HDLC SERPL-MCNC: 68 MG/DL
HGB BLD-MCNC: 17.8 G/DL
IMM GRANULOCYTES NFR BLD AUTO: 0.4 %
LDLC SERPL CALC-MCNC: 146 MG/DL
LYMPHOCYTES # BLD AUTO: 1.62 K/UL
LYMPHOCYTES NFR BLD AUTO: 16.6 %
MAN DIFF?: NORMAL
MCHC RBC-ENTMCNC: 32.9 PG
MCHC RBC-ENTMCNC: 33.7 GM/DL
MCV RBC AUTO: 97.6 FL
MONOCYTES # BLD AUTO: 0.79 K/UL
MONOCYTES NFR BLD AUTO: 8.1 %
NEUTROPHILS # BLD AUTO: 7.22 K/UL
NEUTROPHILS NFR BLD AUTO: 73.8 %
PLATELET # BLD AUTO: 180 K/UL
PROT SERPL-MCNC: 8 G/DL
RBC # BLD: 5.41 M/UL
RBC # FLD: 13 %
TESTOST BND SERPL-MCNC: 4.2 PG/ML
TESTOST SERPL-MCNC: 224.2 NG/DL
TRIGL SERPL-MCNC: 134 MG/DL
WBC # FLD AUTO: 9.78 K/UL

## 2019-04-25 ENCOUNTER — MEDICATION RENEWAL (OUTPATIENT)
Age: 49
End: 2019-04-25

## 2019-06-20 ENCOUNTER — NON-APPOINTMENT (OUTPATIENT)
Age: 49
End: 2019-06-20

## 2019-06-20 ENCOUNTER — APPOINTMENT (OUTPATIENT)
Dept: INTERNAL MEDICINE | Facility: CLINIC | Age: 49
End: 2019-06-20
Payer: COMMERCIAL

## 2019-06-20 VITALS
RESPIRATION RATE: 20 BRPM | OXYGEN SATURATION: 94 % | SYSTOLIC BLOOD PRESSURE: 124 MMHG | BODY MASS INDEX: 31.08 KG/M2 | WEIGHT: 250 LBS | TEMPERATURE: 98 F | HEART RATE: 78 BPM | DIASTOLIC BLOOD PRESSURE: 80 MMHG | HEIGHT: 75 IN

## 2019-06-20 DIAGNOSIS — R79.89 OTHER SPECIFIED ABNORMAL FINDINGS OF BLOOD CHEMISTRY: ICD-10-CM

## 2019-06-20 PROCEDURE — 94060 EVALUATION OF WHEEZING: CPT

## 2019-06-20 PROCEDURE — 99214 OFFICE O/P EST MOD 30 MIN: CPT | Mod: 25

## 2019-06-20 RX ORDER — ZOLPIDEM TARTRATE 10 MG/1
10 TABLET ORAL
Refills: 0 | Status: DISCONTINUED | COMMUNITY
Start: 2017-12-19 | End: 2019-06-20

## 2019-06-20 RX ORDER — GABAPENTIN 300 MG/1
300 CAPSULE ORAL
Qty: 60 | Refills: 0 | Status: DISCONTINUED | COMMUNITY
Start: 2017-12-19 | End: 2019-06-20

## 2019-06-20 NOTE — PHYSICAL EXAM
[Well Nourished] : well nourished [No Acute Distress] : no acute distress [Well Developed] : well developed [Well-Appearing] : well-appearing [PERRL] : pupils equal round and reactive to light [EOMI] : extraocular movements intact [Normal Sclera/Conjunctiva] : normal sclera/conjunctiva [Normal Outer Ear/Nose] : the outer ears and nose were normal in appearance [Normal Oropharynx] : the oropharynx was normal [No JVD] : no jugular venous distention [Supple] : supple [Thyroid Normal, No Nodules] : the thyroid was normal and there were no nodules present [No Lymphadenopathy] : no lymphadenopathy [No Respiratory Distress] : no respiratory distress  [No Accessory Muscle Use] : no accessory muscle use [Clear to Auscultation] : lungs were clear to auscultation bilaterally [Regular Rhythm] : with a regular rhythm [Normal Rate] : normal rate  [No Murmur] : no murmur heard [Normal S1, S2] : normal S1 and S2 [No Carotid Bruits] : no carotid bruits [No Varicosities] : no varicosities [No Abdominal Bruit] : a ~M bruit was not heard ~T in the abdomen [Pedal Pulses Present] : the pedal pulses are present [No Edema] : there was no peripheral edema [No Extremity Clubbing/Cyanosis] : no extremity clubbing/cyanosis [No Palpable Aorta] : no palpable aorta [Non Tender] : non-tender [Soft] : abdomen soft [No HSM] : no HSM [No Masses] : no abdominal mass palpated [Non-distended] : non-distended [Normal Bowel Sounds] : normal bowel sounds [Normal Posterior Cervical Nodes] : no posterior cervical lymphadenopathy [Normal Anterior Cervical Nodes] : no anterior cervical lymphadenopathy [No CVA Tenderness] : no CVA  tenderness [No Spinal Tenderness] : no spinal tenderness [No Joint Swelling] : no joint swelling [No Rash] : no rash [Grossly Normal Strength/Tone] : grossly normal strength/tone [Normal Gait] : normal gait [Coordination Grossly Intact] : coordination grossly intact [No Focal Deficits] : no focal deficits [Normal Affect] : the affect was normal [Normal Insight/Judgement] : insight and judgment were intact [Deep Tendon Reflexes (DTR)] : deep tendon reflexes were 2+ and symmetric

## 2019-07-09 ENCOUNTER — APPOINTMENT (OUTPATIENT)
Dept: INTERNAL MEDICINE | Facility: CLINIC | Age: 49
End: 2019-07-09
Payer: COMMERCIAL

## 2019-07-09 VITALS
HEIGHT: 75 IN | DIASTOLIC BLOOD PRESSURE: 74 MMHG | OXYGEN SATURATION: 95 % | RESPIRATION RATE: 18 BRPM | WEIGHT: 253 LBS | SYSTOLIC BLOOD PRESSURE: 112 MMHG | TEMPERATURE: 97.9 F | BODY MASS INDEX: 31.46 KG/M2 | HEART RATE: 79 BPM

## 2019-07-09 DIAGNOSIS — R09.82 POSTNASAL DRIP: ICD-10-CM

## 2019-07-09 PROCEDURE — 99396 PREV VISIT EST AGE 40-64: CPT

## 2019-07-09 PROCEDURE — 99213 OFFICE O/P EST LOW 20 MIN: CPT | Mod: 25

## 2019-07-09 RX ORDER — CARISOPRODOL 350 MG/1
350 TABLET ORAL
Qty: 60 | Refills: 0 | Status: DISCONTINUED | COMMUNITY
Start: 2017-11-13 | End: 2019-07-09

## 2019-07-09 NOTE — HEALTH RISK ASSESSMENT
[Yes] : Yes [Carbon Monoxide Detector] : carbon monoxide detector [Smoke Detector] : smoke detector [Safety elements used in home] : safety elements used in home [Seat Belt] :  uses seat belt [Sunscreen] : uses sunscreen [] : No [de-identified] : 1-2 per day  [Guns at Home] : no guns at home

## 2019-07-09 NOTE — PLAN
[FreeTextEntry1] : Number one. Continue with medication as outlined above.\par \par 2. The patient will continue with testosterone repletion under the direction of Dr. Kingsley.\par \par 3. Routine blood work to be performed at this time.\par \par 4. The patient will not institute Flonase 2 squirts in each nostril b.i.d. to treat the postnasal drip and subsequent cough.\par \par 5. The patient will look into obtaining the new shingles vaccine in the near future. We'll check his insurance coverage.\par \par 6. Follow up with myself in 6 months with full pulmonary function testing.

## 2019-07-09 NOTE — PHYSICAL EXAM
[General Appearance - Alert] : alert [General Appearance - In No Acute Distress] : in no acute distress [General Appearance - Well Developed] : well developed [General Appearance - Well-Appearing] : healthy appearing [Sclera] : the sclera and conjunctiva were normal [PERRL With Normal Accommodation] : pupils were equal in size, round, and reactive to light [Strabismus] : no strabismus was seen [Outer Ear] : the ears and nose were normal in appearance [Hearing Threshold Finger Rub Not [place]] : hearing was normal [Examination Of The Oral Cavity] : the lips and gums were normal [Nasal Cavity] : the nasal mucosa and septum were normal [Neck Appearance] : the appearance of the neck was normal [Neck Cervical Mass (___cm)] : no neck mass was observed [Jugular Venous Distention Increased] : there was no jugular-venous distention [Thyroid Diffuse Enlargement] : the thyroid was not enlarged [Respiration, Rhythm And Depth] : normal respiratory rhythm and effort [Exaggerated Use Of Accessory Muscles For Inspiration] : no accessory muscle use [Auscultation Breath Sounds / Voice Sounds] : lungs were clear to auscultation bilaterally [Heart Rate And Rhythm] : heart rate was normal and rhythm regular [Heart Sounds] : normal S1 and S2 [Heart Sounds Gallop] : no gallops [Systolic grade ___/6] : A grade [unfilled]/6 systolic murmur was heard. [Edema] : there was no peripheral edema [Veins - Varicosity Changes] : there were no varicosital changes [Abdomen Soft] : soft [Abdomen Tenderness] : non-tender [Cervical Lymph Nodes Enlarged Anterior Bilaterally] : anterior cervical [Supraclavicular Lymph Nodes Enlarged Bilaterally] : supraclavicular [Nail Clubbing] : no clubbing  or cyanosis of the fingernails [Musculoskeletal - Swelling] : no joint swelling seen [Antalgic Gait Bilateral] : antalgic bilaterally [Skin Color & Pigmentation] : normal skin color and pigmentation [Skin Turgor] : normal skin turgor [] : no rash [No Focal Deficits] : no focal deficits [Oriented To Time, Place, And Person] : oriented to person, place, and time [Impaired Insight] : insight and judgment were intact [Affect] : the affect was normal [FreeTextEntry1] : anxious

## 2019-07-09 NOTE — HISTORY OF PRESENT ILLNESS
[de-identified] : The patient comes in today for a wellness evaluation.\par \par Overall, the patient states that he is fairly stable. He did recently undergo surgery on his right shoulder for repair of her rotator cuff. He is still in rehabilitation at this time. He states that there is still a significant decrease in his range of motion, and there is still significant pain.\par \par The patient states that he is compliant with his antihypertensive regimen. He wears his CPAP nocturnally. He sees his urologist on a regular basis for hypogonadism. He injects himself with testosterone approximately every 10 days.\par \par The patient was seen in the office on 6/20/19 for evaluation of a cough. He was concerned that it may have been due to allergies. He now was found to have significant amounts of mold in his house and he is currently undergoing remediation. He at this time, still has a morning cough which is productive of clear sputum. He does have significant postnasal drip. He does not exercise. He denies any other constitutional symptoms. He now comes in for assessment.

## 2019-07-09 NOTE — REVIEW OF SYSTEMS
[Negative] : Heme/Lymph [FreeTextEntry6] : see history of present illness [FreeTextEntry9] : see history of present illness

## 2019-08-29 ENCOUNTER — APPOINTMENT (OUTPATIENT)
Dept: UROLOGY | Facility: CLINIC | Age: 49
End: 2019-08-29
Payer: COMMERCIAL

## 2019-08-29 VITALS
DIASTOLIC BLOOD PRESSURE: 64 MMHG | HEIGHT: 75 IN | BODY MASS INDEX: 31.46 KG/M2 | OXYGEN SATURATION: 95 % | HEART RATE: 85 BPM | TEMPERATURE: 98.1 F | SYSTOLIC BLOOD PRESSURE: 103 MMHG | RESPIRATION RATE: 18 BRPM | WEIGHT: 253 LBS

## 2019-08-29 PROCEDURE — 99213 OFFICE O/P EST LOW 20 MIN: CPT

## 2019-08-30 NOTE — ASSESSMENT
[FreeTextEntry1] : 47 yo M with Low testosterone, symptomatic hypogonadism. Good control with self injections at home. He will perform his labs as ordered. If there are significant abnormalities, will adjust TRT as appropriate. He will continue TRT and obtain labs again in 6 months.

## 2019-08-30 NOTE — PHYSICAL EXAM

## 2019-08-30 NOTE — HISTORY OF PRESENT ILLNESS
[FreeTextEntry1] : 46 yo M with Low Testosterone seen 3/2018 with labs (2/2018) Total 281, free 3.4. He reports depression, low libido and fatigue. It is associated with erectile dysfunction. Of note, pt was found to have elevated LFTs. Dr Damon gave clearance for TRT. He also reports mild LUTS with hesitancy. He was started on TRT and found 300 mg IM q2 weeks to control his symptoms. \par \par 1/3/19: Patient presents for follow up. He has been administering TRT injections without complaint. Good control of his hypogonadism symptoms. LUTS not worse or bothersome. No hematuria, no dysuria, no frequency, no urgency. No incontinence. No fevers, no chills, no nausea, no vomiting, no flank pain. \par He had been scheduled for labs prior to this visit, but he has not yet obtained them. \par \par 08/29/2019: He has been administering TRT injections without complaint. Good control of his hypogonadism symptoms. LUTS not worse or bothersome. No hematuria, no dysuria, no frequency, no urgency. No incontinence. No fevers, no chills, no nausea, no vomiting, no flank pain. \par He had been scheduled for labs prior to this visit, but he has not yet obtained them. He is scheduled for bloodwork by PCP next week.

## 2019-09-17 ENCOUNTER — MESSAGE (OUTPATIENT)
Age: 49
End: 2019-09-17

## 2019-09-19 NOTE — DATA REVIEWED
[FreeTextEntry1] : Spirometry pre-and postbronchodilator therapy shows normal flow rates. FEV1 is 3.84 L which is 84% predicted.

## 2019-09-19 NOTE — PLAN
[FreeTextEntry1] : Mr. Saba is a 48-year-old male who presents for evaluation in complaining of a persistent cough for the past 3 weeks was associated shortness of breath. And spirometry shows normal flow rates. The patient did have a chest x-ray as an outpatient which was unofficially reported as normal. Physical examination is unremarkable. There is no evidence of infection. Mr. Saba will not require antibiotic therapy. I have instructed him to continue the Medrol Dosepak. I've explained that his cough may, in fact, PE secondary to seasonal allergies. I instructed him to take over-the-counter antiallergy medication for the next 7-10 days. If there is no significant for we will notify this office for reevaluation otherwise she will followup with Dr. Cai as scheduled.

## 2019-09-19 NOTE — HISTORY OF PRESENT ILLNESS
[FreeTextEntry8] : Mr. Saba presents for an acute evaluation complaining of a persistent cough for the past 3+ weeks. The cough is worse in the morning. It is productive of clear phlegm. The cough can last throughout the day. Mr. Saba denies any nocturnal symptoms of cough or dyspnea. He states he has an albuterol mucosa he'll which he occasionally uses. He is also complaining of significant shortness of breath associated with the cough. The systolic denies any associated chest pain or palpitations. He has no fevers or chills. Mr. Saba went to a [facility] yesterday it had a chest x-ray and was started on a Medrol Dosepak. He denies any previous history of seasonal allergic tenderness.

## 2019-10-21 NOTE — ED ADULT TRIAGE NOTE - WEIGHT IN KG
111.1 Atrial fibrillation    GERD (gastroesophageal reflux disease)    Gout    Hypertension    Obesity    Other diseases of vocal cords    Other nonspecific abnormal finding of lung field    Reflux    Renal cancer    Type 2 diabetes mellitus

## 2019-10-24 ENCOUNTER — LABORATORY RESULT (OUTPATIENT)
Age: 49
End: 2019-10-24

## 2019-10-24 ENCOUNTER — MEDICATION RENEWAL (OUTPATIENT)
Age: 49
End: 2019-10-24

## 2019-10-28 LAB
ALBUMIN SERPL ELPH-MCNC: 4.3 G/DL
ALP BLD-CCNC: 69 U/L
ALT SERPL-CCNC: 55 U/L
ANION GAP SERPL CALC-SCNC: 16 MMOL/L
APPEARANCE: CLEAR
AST SERPL-CCNC: 74 U/L
BACTERIA: ABNORMAL
BASOPHILS # BLD AUTO: 0.04 K/UL
BASOPHILS NFR BLD AUTO: 0.6 %
BILIRUB SERPL-MCNC: 0.9 MG/DL
BILIRUBIN URINE: NEGATIVE
BLOOD URINE: NEGATIVE
BUN SERPL-MCNC: 18 MG/DL
CALCIUM SERPL-MCNC: 9.7 MG/DL
CHLORIDE SERPL-SCNC: 96 MMOL/L
CHOLEST SERPL-MCNC: 228 MG/DL
CHOLEST/HDLC SERPL: 6.3 RATIO
CO2 SERPL-SCNC: 25 MMOL/L
COLOR: YELLOW
CREAT SERPL-MCNC: 1.23 MG/DL
EOSINOPHIL # BLD AUTO: 0.05 K/UL
EOSINOPHIL NFR BLD AUTO: 0.8 %
ESTIMATED AVERAGE GLUCOSE: 126 MG/DL
GLUCOSE QUALITATIVE U: NEGATIVE
GLUCOSE SERPL-MCNC: 174 MG/DL
HBA1C MFR BLD HPLC: 6 %
HCT VFR BLD CALC: 46.9 %
HDLC SERPL-MCNC: 36 MG/DL
HGB BLD-MCNC: 15.8 G/DL
HYALINE CASTS: 0 /LPF
IMM GRANULOCYTES NFR BLD AUTO: 0.6 %
KETONES URINE: NEGATIVE
LDLC SERPL CALC-MCNC: 145 MG/DL
LEUKOCYTE ESTERASE URINE: NEGATIVE
LYMPHOCYTES # BLD AUTO: 2.28 K/UL
LYMPHOCYTES NFR BLD AUTO: 36.5 %
MAN DIFF?: NORMAL
MCHC RBC-ENTMCNC: 33.5 PG
MCHC RBC-ENTMCNC: 33.7 GM/DL
MCV RBC AUTO: 99.4 FL
MICROSCOPIC-UA: NORMAL
MONOCYTES # BLD AUTO: 0.55 K/UL
MONOCYTES NFR BLD AUTO: 8.8 %
NEUTROPHILS # BLD AUTO: 3.29 K/UL
NEUTROPHILS NFR BLD AUTO: 52.7 %
NITRITE URINE: NEGATIVE
PH URINE: 6.5
PLATELET # BLD AUTO: 122 K/UL
POTASSIUM SERPL-SCNC: 4.3 MMOL/L
PROT SERPL-MCNC: 7.5 G/DL
PROTEIN URINE: NORMAL
PSA SERPL-MCNC: 0.25 NG/ML
RBC # BLD: 4.72 M/UL
RBC # FLD: 12.7 %
RED BLOOD CELLS URINE: 5 /HPF
SODIUM SERPL-SCNC: 137 MMOL/L
SPECIFIC GRAVITY URINE: 1.01
SQUAMOUS EPITHELIAL CELLS: 2 /HPF
T3FREE SERPL-MCNC: 3.45 PG/ML
T3RU NFR SERPL: 1 TBI
T4 FREE SERPL-MCNC: 1.3 NG/DL
T4 SERPL-MCNC: 7.1 UG/DL
TRIGL SERPL-MCNC: 235 MG/DL
TSH SERPL-ACNC: 2.17 UIU/ML
UROBILINOGEN URINE: ABNORMAL
WBC # FLD AUTO: 6.25 K/UL
WHITE BLOOD CELLS URINE: 1 /HPF

## 2019-10-30 PROBLEM — R79.89 ABNORMAL CBC: Status: ACTIVE | Noted: 2019-10-30

## 2019-11-05 LAB
BASOPHILS # BLD AUTO: 0.04 K/UL
BASOPHILS NFR BLD AUTO: 0.6 %
EOSINOPHIL # BLD AUTO: 0.06 K/UL
EOSINOPHIL NFR BLD AUTO: 0.9 %
HCT VFR BLD CALC: 46 %
HGB BLD-MCNC: 15.4 G/DL
IMM GRANULOCYTES NFR BLD AUTO: 0.7 %
LYMPHOCYTES # BLD AUTO: 2.25 K/UL
LYMPHOCYTES NFR BLD AUTO: 31.9 %
MAN DIFF?: NORMAL
MCHC RBC-ENTMCNC: 33.2 PG
MCHC RBC-ENTMCNC: 33.5 GM/DL
MCV RBC AUTO: 99.1 FL
MONOCYTES # BLD AUTO: 0.76 K/UL
MONOCYTES NFR BLD AUTO: 10.8 %
NEUTROPHILS # BLD AUTO: 3.89 K/UL
NEUTROPHILS NFR BLD AUTO: 55.1 %
PLATELET # BLD AUTO: 122 K/UL
RBC # BLD: 4.64 M/UL
RBC # FLD: 12.5 %
WBC # FLD AUTO: 7.05 K/UL

## 2019-11-06 ENCOUNTER — RESULT CHARGE (OUTPATIENT)
Age: 49
End: 2019-11-06

## 2019-11-07 ENCOUNTER — APPOINTMENT (OUTPATIENT)
Dept: INTERNAL MEDICINE | Facility: CLINIC | Age: 49
End: 2019-11-07
Payer: COMMERCIAL

## 2019-11-07 ENCOUNTER — NON-APPOINTMENT (OUTPATIENT)
Age: 49
End: 2019-11-07

## 2019-11-07 VITALS
DIASTOLIC BLOOD PRESSURE: 80 MMHG | OXYGEN SATURATION: 95 % | WEIGHT: 264 LBS | HEART RATE: 88 BPM | HEIGHT: 75 IN | SYSTOLIC BLOOD PRESSURE: 120 MMHG | TEMPERATURE: 98.3 F | RESPIRATION RATE: 16 BRPM | BODY MASS INDEX: 32.83 KG/M2

## 2019-11-07 DIAGNOSIS — K76.0 FATTY (CHANGE OF) LIVER, NOT ELSEWHERE CLASSIFIED: ICD-10-CM

## 2019-11-07 DIAGNOSIS — E78.00 PURE HYPERCHOLESTEROLEMIA, UNSPECIFIED: ICD-10-CM

## 2019-11-07 DIAGNOSIS — R94.5 ABNORMAL RESULTS OF LIVER FUNCTION STUDIES: ICD-10-CM

## 2019-11-07 PROCEDURE — 99214 OFFICE O/P EST MOD 30 MIN: CPT | Mod: 25

## 2019-11-07 PROCEDURE — 94060 EVALUATION OF WHEEZING: CPT

## 2019-11-07 RX ORDER — CHOLECALCIFEROL (VITAMIN D3) 50 MCG
50 MCG TABLET ORAL
Refills: 0 | Status: DISCONTINUED | COMMUNITY
End: 2019-11-07

## 2019-11-08 PROBLEM — K76.0 FATTY INFILTRATION OF LIVER: Status: ACTIVE | Noted: 2018-03-05

## 2019-11-08 NOTE — REVIEW OF SYSTEMS
[Recent Change In Weight] : ~T recent weight change [Fatigue] : fatigue [Postnasal Drip] : postnasal drip [Wheezing] : wheezing [Cough] : cough [Hesitancy] : hesitancy [Joint Pain] : joint pain [Negative] : Psychiatric [Fever] : no fever [Chest Pain] : no chest pain [Lower Ext Edema] : no lower extremity edema

## 2019-11-08 NOTE — DATA REVIEWED
[FreeTextEntry1] : pre/post spirometry shows normal flows without significant airway reactivity\par

## 2019-11-08 NOTE — ASSESSMENT
[FreeTextEntry1] : \par \par Pt advised he must reduce alcohol consumption\par \par GI evaluation ( to evaluate LFTs, bloating and low platelets  ( pt prefers not to return to previous GI)\par \par CXR Brynn\par \par Long discussion focusing on benefits of  Lifestyle modifications.  Encouraged healthy diet with lean meats, fish, chicken,increased fruits and veggies, higher fiber and avoidance of sweets, soft drinks and fruit juices.   Discussed  portion control  and importance of  increased exercise for health promotion and disease prevention. Calorie restriction with 500 -1000 thanh necessary to promote weight loss of 1-2 lbs weekly.\par \par Recheck BW 6 weeks.    \par \par FU .  Could testosterone use be contributing to current symptoms? \par \par FU 3 months.\par \par FU 6 months with  with PFT\par \par \par

## 2019-11-08 NOTE — COUNSELING
[Potential consequences of obesity discussed] : Potential consequences of obesity discussed [Benefits of weight loss discussed] : Benefits of weight loss discussed [Encouraged to increase physical activity] : Encouraged to increase physical activity [Decrease Portions] : decrease portions [____ min/wk Activity] : [unfilled] min/wk activity [Needs reinforcement, provided] : Patient needs reinforcement on understanding of disease, goals and obesity follow-up plan; reinforcement was provided

## 2019-11-08 NOTE — HISTORY OF PRESENT ILLNESS
[Spouse] : spouse [de-identified] : Presents to review Bw but  has not been feeling well for some time.  \par He recently had a tick bite and  developed a rash and was placed on Doxycyline for 4 weeks  through the [facility]  He has fatigue   and joint pains which are fairly chronic.  No fevers.  Compliant with CPAP. \par He has a persistent  productive cough usually in the am with SOB and wheeze.  Sputum is clear.  \par He feels bloated with intermittent epigastric discomfort  and he is gaining weight although he only eats 2 meals a day.   Drinks wine or vodka 2 glasses daily. Denies chest pain, N/V/D/C, melena/BRBPR.  Last visit to GI 2 years ago to evaluate LFTs prior to starting testosterone replacement.  \par Difficult for him to exercise due to multiple  ortho issues. \par  [FreeTextEntry1] : FU Review BW

## 2019-11-08 NOTE — PHYSICAL EXAM
[Normal Sclera/Conjunctiva] : normal sclera/conjunctiva [PERRL] : pupils equal round and reactive to light [Normal TMs] : both tympanic membranes were normal [No Edema] : there was no peripheral edema [No Extremity Clubbing/Cyanosis] : no extremity clubbing/cyanosis [Soft] : abdomen soft [Normal Bowel Sounds] : normal bowel sounds [No Joint Swelling] : no joint swelling [Grossly Normal Strength/Tone] : grossly normal strength/tone [Normal] : affect was normal and insight and judgment were intact [de-identified] : redundant soft palpate  [de-identified] : +diastasis recti   [de-identified] : cassidy complexion  [de-identified] : bull neck

## 2019-11-17 ENCOUNTER — TRANSCRIPTION ENCOUNTER (OUTPATIENT)
Age: 49
End: 2019-11-17

## 2020-01-15 ENCOUNTER — TRANSCRIPTION ENCOUNTER (OUTPATIENT)
Age: 50
End: 2020-01-15

## 2020-01-15 ENCOUNTER — INPATIENT (INPATIENT)
Facility: HOSPITAL | Age: 50
LOS: 2 days | Discharge: HOME | DRG: 194 | End: 2020-01-18
Attending: INTERNAL MEDICINE | Admitting: INTERNAL MEDICINE
Payer: COMMERCIAL

## 2020-01-15 VITALS
RESPIRATION RATE: 18 BRPM | HEART RATE: 86 BPM | OXYGEN SATURATION: 92 % | SYSTOLIC BLOOD PRESSURE: 77 MMHG | TEMPERATURE: 98 F | WEIGHT: 259.93 LBS | DIASTOLIC BLOOD PRESSURE: 45 MMHG

## 2020-01-15 DIAGNOSIS — Z96.651 PRESENCE OF RIGHT ARTIFICIAL KNEE JOINT: Chronic | ICD-10-CM

## 2020-01-15 DIAGNOSIS — Z98.1 ARTHRODESIS STATUS: Chronic | ICD-10-CM

## 2020-01-15 DIAGNOSIS — J10.1 INFLUENZA DUE TO OTHER IDENTIFIED INFLUENZA VIRUS WITH OTHER RESPIRATORY MANIFESTATIONS: ICD-10-CM

## 2020-01-15 LAB
ADD ON TEST-SPECIMEN IN LAB: SIGNIFICANT CHANGE UP
ADD ON TEST-SPECIMEN IN LAB: SIGNIFICANT CHANGE UP
ALBUMIN SERPL ELPH-MCNC: 3.2 G/DL — LOW (ref 3.3–5)
ALP SERPL-CCNC: 63 U/L — SIGNIFICANT CHANGE UP (ref 40–120)
ALT FLD-CCNC: 59 U/L — SIGNIFICANT CHANGE UP (ref 12–78)
ANION GAP SERPL CALC-SCNC: 4 MMOL/L — LOW (ref 5–17)
ANISOCYTOSIS BLD QL: SLIGHT — SIGNIFICANT CHANGE UP
APPEARANCE UR: CLEAR — SIGNIFICANT CHANGE UP
APTT BLD: 35.5 SEC — SIGNIFICANT CHANGE UP (ref 27.5–36.3)
AST SERPL-CCNC: 95 U/L — HIGH (ref 15–37)
BASE EXCESS BLDV CALC-SCNC: 0.2 MMOL/L — SIGNIFICANT CHANGE UP (ref -2–2)
BASOPHILS # BLD AUTO: 0 K/UL — SIGNIFICANT CHANGE UP (ref 0–0.2)
BASOPHILS NFR BLD AUTO: 0 % — SIGNIFICANT CHANGE UP (ref 0–2)
BILIRUB SERPL-MCNC: 0.9 MG/DL — SIGNIFICANT CHANGE UP (ref 0.2–1.2)
BILIRUB UR-MCNC: NEGATIVE — SIGNIFICANT CHANGE UP
BUN SERPL-MCNC: 29 MG/DL — HIGH (ref 7–23)
CALCIUM SERPL-MCNC: 7.8 MG/DL — LOW (ref 8.5–10.1)
CHLORIDE SERPL-SCNC: 101 MMOL/L — SIGNIFICANT CHANGE UP (ref 96–108)
CK SERPL-CCNC: 152 U/L — SIGNIFICANT CHANGE UP (ref 26–308)
CO2 SERPL-SCNC: 27 MMOL/L — SIGNIFICANT CHANGE UP (ref 22–31)
COLOR SPEC: YELLOW — SIGNIFICANT CHANGE UP
CREAT SERPL-MCNC: 2.04 MG/DL — HIGH (ref 0.5–1.3)
D DIMER BLD IA.RAPID-MCNC: 641 NG/ML DDU — HIGH
DIFF PNL FLD: NEGATIVE — SIGNIFICANT CHANGE UP
EOSINOPHIL # BLD AUTO: 0 K/UL — SIGNIFICANT CHANGE UP (ref 0–0.5)
EOSINOPHIL NFR BLD AUTO: 0 % — SIGNIFICANT CHANGE UP (ref 0–6)
FLU A RESULT: DETECTED
FLU A RESULT: DETECTED
FLUAV AG NPH QL: DETECTED
FLUBV AG NPH QL: SIGNIFICANT CHANGE UP
GAS PNL BLDV: SIGNIFICANT CHANGE UP
GLUCOSE SERPL-MCNC: 128 MG/DL — HIGH (ref 70–99)
GLUCOSE UR QL: NEGATIVE MG/DL — SIGNIFICANT CHANGE UP
HCO3 BLDV-SCNC: 26 MMOL/L — SIGNIFICANT CHANGE UP (ref 21–29)
HCT VFR BLD CALC: 33.7 % — LOW (ref 39–50)
HGB BLD-MCNC: 11.1 G/DL — LOW (ref 13–17)
INR BLD: 1.32 RATIO — HIGH (ref 0.88–1.16)
KETONES UR-MCNC: NEGATIVE — SIGNIFICANT CHANGE UP
LACTATE SERPL-SCNC: 1.5 MMOL/L — SIGNIFICANT CHANGE UP (ref 0.7–2)
LEUKOCYTE ESTERASE UR-ACNC: NEGATIVE — SIGNIFICANT CHANGE UP
LG PLATELETS BLD QL AUTO: SLIGHT — SIGNIFICANT CHANGE UP
LYMPHOCYTES # BLD AUTO: 0.72 K/UL — LOW (ref 1–3.3)
LYMPHOCYTES # BLD AUTO: 10 % — LOW (ref 13–44)
MACROCYTES BLD QL: SLIGHT — SIGNIFICANT CHANGE UP
MANUAL SMEAR VERIFICATION: SIGNIFICANT CHANGE UP
MCHC RBC-ENTMCNC: 32.9 GM/DL — SIGNIFICANT CHANGE UP (ref 32–36)
MCHC RBC-ENTMCNC: 33 PG — SIGNIFICANT CHANGE UP (ref 27–34)
MCV RBC AUTO: 100.3 FL — HIGH (ref 80–100)
MONOCYTES # BLD AUTO: 0.72 K/UL — SIGNIFICANT CHANGE UP (ref 0–0.9)
MONOCYTES NFR BLD AUTO: 10 % — SIGNIFICANT CHANGE UP (ref 2–14)
NEUTROPHILS # BLD AUTO: 5.58 K/UL — SIGNIFICANT CHANGE UP (ref 1.8–7.4)
NEUTROPHILS NFR BLD AUTO: 77 % — SIGNIFICANT CHANGE UP (ref 43–77)
NEUTS BAND # BLD: 1 % — SIGNIFICANT CHANGE UP (ref 0–8)
NITRITE UR-MCNC: NEGATIVE — SIGNIFICANT CHANGE UP
NRBC # BLD: 0 /100 — SIGNIFICANT CHANGE UP (ref 0–0)
NRBC # BLD: SIGNIFICANT CHANGE UP /100 WBCS (ref 0–0)
NT-PROBNP SERPL-SCNC: 271 PG/ML — HIGH (ref 0–125)
PCO2 BLDV: 50 MMHG — SIGNIFICANT CHANGE UP (ref 35–50)
PH BLDV: 7.33 — LOW (ref 7.35–7.45)
PH UR: 6 — SIGNIFICANT CHANGE UP (ref 5–8)
PLAT MORPH BLD: NORMAL — SIGNIFICANT CHANGE UP
PLATELET # BLD AUTO: 77 K/UL — LOW (ref 150–400)
PO2 BLDV: 85 MMHG — HIGH (ref 25–45)
POTASSIUM SERPL-MCNC: 5.2 MMOL/L — SIGNIFICANT CHANGE UP (ref 3.5–5.3)
POTASSIUM SERPL-SCNC: 5.2 MMOL/L — SIGNIFICANT CHANGE UP (ref 3.5–5.3)
PROT SERPL-MCNC: 6.6 GM/DL — SIGNIFICANT CHANGE UP (ref 6–8.3)
PROT UR-MCNC: NEGATIVE MG/DL — SIGNIFICANT CHANGE UP
PROTHROM AB SERPL-ACNC: 14.8 SEC — HIGH (ref 10–12.9)
RBC # BLD: 3.36 M/UL — LOW (ref 4.2–5.8)
RBC # FLD: 13 % — SIGNIFICANT CHANGE UP (ref 10.3–14.5)
RBC BLD AUTO: ABNORMAL
RSV RESULT: SIGNIFICANT CHANGE UP
RSV RNA RESP QL NAA+PROBE: SIGNIFICANT CHANGE UP
SAO2 % BLDV: 96 % — HIGH (ref 67–88)
SODIUM SERPL-SCNC: 132 MMOL/L — LOW (ref 135–145)
SP GR SPEC: 1.01 — SIGNIFICANT CHANGE UP (ref 1.01–1.02)
TROPONIN I SERPL-MCNC: <0.015 NG/ML — SIGNIFICANT CHANGE UP (ref 0.01–0.04)
UROBILINOGEN FLD QL: 1 MG/DL
VARIANT LYMPHS # BLD: 2 % — SIGNIFICANT CHANGE UP (ref 0–6)
WBC # BLD: 7.16 K/UL — SIGNIFICANT CHANGE UP (ref 3.8–10.5)
WBC # FLD AUTO: 7.16 K/UL — SIGNIFICANT CHANGE UP (ref 3.8–10.5)

## 2020-01-15 PROCEDURE — 87486 CHLMYD PNEUM DNA AMP PROBE: CPT

## 2020-01-15 PROCEDURE — 94640 AIRWAY INHALATION TREATMENT: CPT

## 2020-01-15 PROCEDURE — 81003 URINALYSIS AUTO W/O SCOPE: CPT

## 2020-01-15 PROCEDURE — 82746 ASSAY OF FOLIC ACID SERUM: CPT

## 2020-01-15 PROCEDURE — 80048 BASIC METABOLIC PNL TOTAL CA: CPT

## 2020-01-15 PROCEDURE — 87581 M.PNEUMON DNA AMP PROBE: CPT

## 2020-01-15 PROCEDURE — 87798 DETECT AGENT NOS DNA AMP: CPT

## 2020-01-15 PROCEDURE — 93970 EXTREMITY STUDY: CPT

## 2020-01-15 PROCEDURE — 93010 ELECTROCARDIOGRAM REPORT: CPT

## 2020-01-15 PROCEDURE — 71045 X-RAY EXAM CHEST 1 VIEW: CPT | Mod: 26

## 2020-01-15 PROCEDURE — 87086 URINE CULTURE/COLONY COUNT: CPT

## 2020-01-15 PROCEDURE — 85027 COMPLETE CBC AUTOMATED: CPT

## 2020-01-15 PROCEDURE — 87633 RESP VIRUS 12-25 TARGETS: CPT

## 2020-01-15 PROCEDURE — 84100 ASSAY OF PHOSPHORUS: CPT

## 2020-01-15 PROCEDURE — 83735 ASSAY OF MAGNESIUM: CPT

## 2020-01-15 PROCEDURE — 71275 CT ANGIOGRAPHY CHEST: CPT

## 2020-01-15 PROCEDURE — 82607 VITAMIN B-12: CPT

## 2020-01-15 PROCEDURE — 36415 COLL VENOUS BLD VENIPUNCTURE: CPT

## 2020-01-15 RX ORDER — TRAZODONE HCL 50 MG
100 TABLET ORAL AT BEDTIME
Refills: 0 | Status: DISCONTINUED | OUTPATIENT
Start: 2020-01-15 | End: 2020-01-18

## 2020-01-15 RX ORDER — IPRATROPIUM/ALBUTEROL SULFATE 18-103MCG
3 AEROSOL WITH ADAPTER (GRAM) INHALATION EVERY 6 HOURS
Refills: 0 | Status: DISCONTINUED | OUTPATIENT
Start: 2020-01-15 | End: 2020-01-18

## 2020-01-15 RX ORDER — OXYCODONE HYDROCHLORIDE 5 MG/1
1 TABLET ORAL
Qty: 0 | Refills: 0 | DISCHARGE

## 2020-01-15 RX ORDER — ZOLPIDEM TARTRATE 10 MG/1
0 TABLET ORAL
Qty: 30 | Refills: 0 | DISCHARGE

## 2020-01-15 RX ORDER — PANTOPRAZOLE SODIUM 20 MG/1
40 TABLET, DELAYED RELEASE ORAL
Refills: 0 | Status: DISCONTINUED | OUTPATIENT
Start: 2020-01-15 | End: 2020-01-18

## 2020-01-15 RX ORDER — SODIUM CHLORIDE 9 MG/ML
1000 INJECTION INTRAMUSCULAR; INTRAVENOUS; SUBCUTANEOUS
Refills: 0 | Status: DISCONTINUED | OUTPATIENT
Start: 2020-01-15 | End: 2020-01-17

## 2020-01-15 RX ORDER — METOPROLOL TARTRATE 50 MG
50 TABLET ORAL DAILY
Refills: 0 | Status: DISCONTINUED | OUTPATIENT
Start: 2020-01-15 | End: 2020-01-18

## 2020-01-15 RX ORDER — IBUPROFEN 200 MG
600 TABLET ORAL EVERY 6 HOURS
Refills: 0 | Status: DISCONTINUED | OUTPATIENT
Start: 2020-01-15 | End: 2020-01-15

## 2020-01-15 RX ORDER — OXYCODONE HYDROCHLORIDE 5 MG/1
15 TABLET ORAL EVERY 6 HOURS
Refills: 0 | Status: DISCONTINUED | OUTPATIENT
Start: 2020-01-15 | End: 2020-01-18

## 2020-01-15 RX ORDER — OMEPRAZOLE 10 MG/1
0 CAPSULE, DELAYED RELEASE ORAL
Qty: 0 | Refills: 0 | DISCHARGE

## 2020-01-15 RX ORDER — CLONAZEPAM 1 MG
1 TABLET ORAL
Refills: 0 | Status: DISCONTINUED | OUTPATIENT
Start: 2020-01-15 | End: 2020-01-18

## 2020-01-15 RX ORDER — ACETAMINOPHEN 500 MG
650 TABLET ORAL EVERY 6 HOURS
Refills: 0 | Status: DISCONTINUED | OUTPATIENT
Start: 2020-01-15 | End: 2020-01-18

## 2020-01-15 RX ORDER — VALSARTAN 80 MG/1
1 TABLET ORAL
Qty: 0 | Refills: 0 | DISCHARGE

## 2020-01-15 RX ORDER — SODIUM CHLORIDE 9 MG/ML
3000 INJECTION INTRAMUSCULAR; INTRAVENOUS; SUBCUTANEOUS ONCE
Refills: 0 | Status: COMPLETED | OUTPATIENT
Start: 2020-01-15 | End: 2020-01-15

## 2020-01-15 RX ORDER — SODIUM CHLORIDE 9 MG/ML
1000 INJECTION INTRAMUSCULAR; INTRAVENOUS; SUBCUTANEOUS ONCE
Refills: 0 | Status: COMPLETED | OUTPATIENT
Start: 2020-01-15 | End: 2020-01-15

## 2020-01-15 RX ORDER — ESCITALOPRAM OXALATE 10 MG/1
20 TABLET, FILM COATED ORAL DAILY
Refills: 0 | Status: DISCONTINUED | OUTPATIENT
Start: 2020-01-15 | End: 2020-01-18

## 2020-01-15 RX ORDER — INFLUENZA VIRUS VACCINE 15; 15; 15; 15 UG/.5ML; UG/.5ML; UG/.5ML; UG/.5ML
0.5 SUSPENSION INTRAMUSCULAR ONCE
Refills: 0 | Status: COMPLETED | OUTPATIENT
Start: 2020-01-15 | End: 2020-01-15

## 2020-01-15 RX ORDER — METHYLPHENIDATE HCL 5 MG
18 TABLET ORAL EVERY MORNING
Refills: 0 | Status: DISCONTINUED | OUTPATIENT
Start: 2020-01-16 | End: 2020-01-16

## 2020-01-15 RX ORDER — HEPARIN SODIUM 5000 [USP'U]/ML
5000 INJECTION INTRAVENOUS; SUBCUTANEOUS EVERY 8 HOURS
Refills: 0 | Status: DISCONTINUED | OUTPATIENT
Start: 2020-01-15 | End: 2020-01-18

## 2020-01-15 RX ADMIN — SODIUM CHLORIDE 3000 MILLILITER(S): 9 INJECTION INTRAMUSCULAR; INTRAVENOUS; SUBCUTANEOUS at 18:01

## 2020-01-15 RX ADMIN — Medication 75 MILLIGRAM(S): at 18:25

## 2020-01-15 RX ADMIN — SODIUM CHLORIDE 1000 MILLILITER(S): 9 INJECTION INTRAMUSCULAR; INTRAVENOUS; SUBCUTANEOUS at 18:25

## 2020-01-15 RX ADMIN — SODIUM CHLORIDE 150 MILLILITER(S): 9 INJECTION INTRAMUSCULAR; INTRAVENOUS; SUBCUTANEOUS at 23:07

## 2020-01-15 RX ADMIN — HEPARIN SODIUM 5000 UNIT(S): 5000 INJECTION INTRAVENOUS; SUBCUTANEOUS at 23:06

## 2020-01-15 RX ADMIN — SODIUM CHLORIDE 3000 MILLILITER(S): 9 INJECTION INTRAMUSCULAR; INTRAVENOUS; SUBCUTANEOUS at 16:04

## 2020-01-15 NOTE — ED ADULT TRIAGE NOTE - CHIEF COMPLAINT QUOTE
pt presents to ED due to hypotension pt sent from urgent care received 600 cc of normal saline from EMS with  no improvement in BP pt A&O x 3 complaints of generalized weakness

## 2020-01-15 NOTE — ED PROVIDER NOTE - CONSTITUTIONAL, MLM
normal... awake, alert, oriented to person, place, time/situation and in no apparent distress. +obese

## 2020-01-15 NOTE — H&P ADULT - NSHPLABSRESULTS_GEN_ALL_CORE
11.1   7.16  )-----------( 77       ( 15 Melvin 2020 15:52 )             33.7       CBC Full  -  ( 15 Melvin 2020 15:52 )  WBC Count : 7.16 K/uL  RBC Count : 3.36 M/uL  Hemoglobin : 11.1 g/dL  Hematocrit : 33.7 %  Platelet Count - Automated : 77 K/uL  Mean Cell Volume : 100.3 fl  Mean Cell Hemoglobin : 33.0 pg  Mean Cell Hemoglobin Concentration : 32.9 gm/dL  Auto Neutrophil # : 5.58 K/uL  Auto Lymphocyte # : 0.72 K/uL  Auto Monocyte # : 0.72 K/uL  Auto Eosinophil # : 0.00 K/uL  Auto Basophil # : 0.00 K/uL  Auto Neutrophil % : 77.0 %  Auto Lymphocyte % : 10.0 %  Auto Monocyte % : 10.0 %  Auto Eosinophil % : 0.0 %  Auto Basophil % : 0.0 %      15    132<L>  |  101  |  29<H>  ----------------------------<  128<H>  5.2   |  27  |  2.04<H>    Ca    7.8<L>      15 Melvin 2020 15:52    TPro  6.6  /  Alb  3.2<L>  /  TBili  0.9  /  DBili  x   /  AST  95<H>  /  ALT  59  /  AlkPhos  63  01-15      LIVER FUNCTIONS - ( 15 Melvin 2020 15:52 )  Alb: 3.2 g/dL / Pro: 6.6 gm/dL / ALK PHOS: 63 U/L / ALT: 59 U/L / AST: 95 U/L / GGT: x             PT/INR - ( 15 Melvin 2020 15:52 )   PT: 14.8 sec;   INR: 1.32 ratio         PTT - ( 15 Melvin 2020 15:52 )  PTT:35.5 sec    CARDIAC MARKERS ( 15 Melvin 2020 15:52 )  <0.015 ng/mL / x     / 152 U/L / x     / x            Urinalysis Basic - ( 15 Melvin 2020 19:50 )    Color: Yellow / Appearance: Clear / S.010 / pH: x  Gluc: x / Ketone: Negative  / Bili: Negative / Urobili: 1 mg/dL   Blood: x / Protein: Negative mg/dL / Nitrite: Negative   Leuk Esterase: Negative / RBC: x / WBC x   Sq Epi: x / Non Sq Epi: x / Bacteria: x            MEDICATIONS  (STANDING):  albuterol/ipratropium for Nebulization 3 milliLiter(s) Nebulizer every 6 hours  escitalopram 20 milliGRAM(s) Oral daily  heparin  Injectable 5000 Unit(s) SubCutaneous every 8 hours  methylphenidate ER (CONCERTA) 18 milliGRAM(s) Oral daily  metoprolol succinate ER 50 milliGRAM(s) Oral daily  oseltamivir 75 milliGRAM(s) Oral two times a day  pantoprazole    Tablet 40 milliGRAM(s) Oral before breakfast  sodium chloride 0.9%. 1000 milliLiter(s) (150 mL/Hr) IV Continuous <Continuous>  traZODone 100 milliGRAM(s) Oral at bedtime

## 2020-01-15 NOTE — H&P ADULT - ASSESSMENT
49/M with PMHx of HTN, spinal fusion, abdominal hernia, chronic back pain, GERD, Lyme disease, RA, sleep apnea, knee reconstruction admitted with     1) SOB sec to Flu A:  admit supportive O2  duonebs  treat Flu  pulmo consult    2) Influenza A: pt appears weak and washed out from Flu  admit as in pt  iv fluids  tamiflu 75 mg bid  droplet precautions    3) Hypotensive Episode: resolved with iv fluids  cont iv fluids  hold losartan and HCTZ  cont BB with holding parameters    4) LESLI: sec to dehydration and HCTZ  baseline Cr normal, today 2.04  d/c HCTZ  iv fluids  BMP in am  if not improving or worsening, then, renal consult    5) HTN: cont BB  hold losartan and hctz    6) Hyponatremia 132: iv fluids    7) DVT PPX: heparin s/q    poc discussed with pt, team

## 2020-01-15 NOTE — ED ADULT NURSE NOTE - OBJECTIVE STATEMENT
Pt sent here from urgent care for being hypotensive and hypoxic. Pt has been sob and weak for months however he couldn't breathe this am so went to urgent care. EMS placed a RAC 18G IV and gave 1L NS. hx sleep apnea and pneumonia 2 months ago. pt talking in complete sentences, no resp distress. placed pt on 2L o2. Pt does have a dry cough, swabbed pt for flu. pt is afebrile

## 2020-01-15 NOTE — ED ADULT NURSE REASSESSMENT NOTE - NSIMPLEMENTINTERV_GEN_ALL_ED
Implemented All Fall with Harm Risk Interventions:  [place] to call system. Call bell, personal items and telephone within reach. Instruct patient to call for assistance. Room bathroom lighting operational. Non-slip footwear when patient is off stretcher. Physically safe environment: no spills, clutter or unnecessary equipment. Stretcher in lowest position, wheels locked, appropriate side rails in place. Provide visual cue, wrist band, yellow gown, etc. Monitor gait and stability. Monitor for mental status changes and reorient to person, place, and time. Review medications for side effects contributing to fall risk. Reinforce activity limits and safety measures with patient and family. Provide visual clues: red socks.

## 2020-01-15 NOTE — H&P ADULT - HISTORY OF PRESENT ILLNESS
49/M with PMHx of HTN, spinal fusion, abdominal hernia, chronic back pain, GERD, Lyme disease, RA, sleep apnea, knee reconstruction presents to the ED c/o SOB x1 day. Pt reports feeling SOB for the past few weeks but it worsened today. States he was feeling weak and SOB so he went to urgent care, was found to be hypotensive, EMS was called and they gave pt 700 ml of saline and brought to the ED for further evaluation.     In ED , he was found to have Flu A with initial BP in high 70s low 80s. He was given iv fluids and BP normalized.

## 2020-01-15 NOTE — ED PROVIDER NOTE - PROGRESS NOTE DETAILS
Vanessa BALBUENA for ED attending, Dr. Gordillo: Decision made to provide 3L of IV fluids as pt already received 700 prior to arrival and equivalent is 30mL per kg.

## 2020-01-15 NOTE — ED PROVIDER NOTE - OBJECTIVE STATEMENT
50 y/o male with PMHx of HTN, spinal fusion, abdominal hernia, chronic back pain, GERD, Lyme disease, RA, sleep apnea, knee reconstruction presents to the ED c/o SOB today. Pt reports feeling SOB for the past few weeks but it worsened today. States he was feeling weak and SOB so he went to urgent care, was found to be hypotensive, EMS was called and they gave pt 700 of saline and brought to the ED for further evaluation. Denies rash. Also reports abdominal pain for the past few months. Non smoker, no EtOH use or illicit drug use. Compliant with HTN medications.

## 2020-01-15 NOTE — ED ADULT NURSE NOTE - PSH
Problem: Altered Thought Process (Adult/Pediatric)  Goal: *STG: Participates in treatment plan  Outcome: Progressing Towards Goal  Pt pleasant, cooperative. Walking well with walker. Alert and oriented. Goal: *STG: Complies with medication therapy  Outcome: Progressing Towards Goal  Med compliant  Goal: *STG: Attends activities and groups  Outcome: Progressing Towards Goal  Attends groups on nicole and general    1422 Pt vomited large amount on bathroom floor. Dr. Echavarria Neigh aware. Staff will continue to monitor. Pt states she \"feels better\". H/O spinal fusion  2014  History of total right knee replacement

## 2020-01-15 NOTE — H&P ADULT - NSHPPHYSICALEXAM_GEN_ALL_CORE
PHYSICAL EXAM:      T(C): 36.8 (15 Melvin 2020 19:53), Max: 36.8 (15 Melvin 2020 15:20)  T(F): 98.2 (15 Melvin 2020 19:53), Max: 98.2 (15 Melvin 2020 15:20)  HR: 91 (15 Melvin 2020 19:53) (69 - 91)  BP: 106/74 (15 Melvin 2020 19:53) (77/45 - 106/74)  RR: 18 (15 Melvin 2020 19:53) (15 - 18)  SpO2: 97% (15 Melvin 2020 19:53) (92% - 97%)      Constitutional: Weak and ill appearing  HEENT: Atraumatic, CHAN, Normal, No congestion  Respiratory: Breath Sounds normal, b/l rhonchi, no wheeze  Cardiovascular: N S1S2;   Gastrointestinal: Abdomen soft, non tender, Bowel Sounds present  Extremities: No edema, peripheral pulses present  Neurological: AAO x 3, no gross focal motor deficits  Skin: Non cellulitic, no rash, ulcers  Lymph Nodes: No lymphadenopathy noted  Back: No CVA tenderness   Musculoskeletal: non tender  Breasts: Deferred  Genitourinary: deferred  Rectal: Deferred

## 2020-01-15 NOTE — H&P ADULT - NSICDXPASTMEDICALHX_GEN_ALL_CORE_FT
PAST MEDICAL HISTORY:  Chronic back pain     GERD (gastroesophageal reflux disease)     HTN (hypertension)     Lyme disease     RA (rheumatoid arthritis)     Sleep apnea, unspecified type     Umbilical hernia

## 2020-01-15 NOTE — ED ADULT NURSE REASSESSMENT NOTE - NS ED NURSE REASSESS COMMENT FT1
Pt alert and oriented, VSS-BP improved. Pt continues to have a frequent "painful" cough-concerned about a possible PNA-RVP still pending. Pt has a slight wheeze post cough, pt currently satting 97% on 2 liters nasal cannula. Pt OOB independently to sit in chair and stretch back as stretcher is "painful". Plan of care reviewed. Pt updated on plan to transfer pt to floor when orders are received and bed is available. Call bell in reach.

## 2020-01-16 DIAGNOSIS — R05 COUGH: ICD-10-CM

## 2020-01-16 DIAGNOSIS — J10.1 INFLUENZA DUE TO OTHER IDENTIFIED INFLUENZA VIRUS WITH OTHER RESPIRATORY MANIFESTATIONS: ICD-10-CM

## 2020-01-16 DIAGNOSIS — G47.30 SLEEP APNEA, UNSPECIFIED: ICD-10-CM

## 2020-01-16 DIAGNOSIS — N17.9 ACUTE KIDNEY FAILURE, UNSPECIFIED: ICD-10-CM

## 2020-01-16 LAB
ANION GAP SERPL CALC-SCNC: 4 MMOL/L — LOW (ref 5–17)
BUN SERPL-MCNC: 18 MG/DL — SIGNIFICANT CHANGE UP (ref 7–23)
CALCIUM SERPL-MCNC: 7.9 MG/DL — LOW (ref 8.5–10.1)
CHLORIDE SERPL-SCNC: 105 MMOL/L — SIGNIFICANT CHANGE UP (ref 96–108)
CO2 SERPL-SCNC: 27 MMOL/L — SIGNIFICANT CHANGE UP (ref 22–31)
CREAT SERPL-MCNC: 1.26 MG/DL — SIGNIFICANT CHANGE UP (ref 0.5–1.3)
CULTURE RESULTS: SIGNIFICANT CHANGE UP
GLUCOSE SERPL-MCNC: 120 MG/DL — HIGH (ref 70–99)
HCT VFR BLD CALC: 34.9 % — LOW (ref 39–50)
HGB BLD-MCNC: 11.6 G/DL — LOW (ref 13–17)
MAGNESIUM SERPL-MCNC: 1.9 MG/DL — SIGNIFICANT CHANGE UP (ref 1.6–2.6)
MCHC RBC-ENTMCNC: 33.2 GM/DL — SIGNIFICANT CHANGE UP (ref 32–36)
MCHC RBC-ENTMCNC: 33.4 PG — SIGNIFICANT CHANGE UP (ref 27–34)
MCV RBC AUTO: 100.6 FL — HIGH (ref 80–100)
PHOSPHATE SERPL-MCNC: 1.9 MG/DL — LOW (ref 2.5–4.5)
PLATELET # BLD AUTO: 73 K/UL — LOW (ref 150–400)
POTASSIUM SERPL-MCNC: 4.5 MMOL/L — SIGNIFICANT CHANGE UP (ref 3.5–5.3)
POTASSIUM SERPL-SCNC: 4.5 MMOL/L — SIGNIFICANT CHANGE UP (ref 3.5–5.3)
RBC # BLD: 3.47 M/UL — LOW (ref 4.2–5.8)
RBC # FLD: 13.1 % — SIGNIFICANT CHANGE UP (ref 10.3–14.5)
SODIUM SERPL-SCNC: 136 MMOL/L — SIGNIFICANT CHANGE UP (ref 135–145)
SPECIMEN SOURCE: SIGNIFICANT CHANGE UP
WBC # BLD: 5.89 K/UL — SIGNIFICANT CHANGE UP (ref 3.8–10.5)
WBC # FLD AUTO: 5.89 K/UL — SIGNIFICANT CHANGE UP (ref 3.8–10.5)

## 2020-01-16 PROCEDURE — 99223 1ST HOSP IP/OBS HIGH 75: CPT

## 2020-01-16 RX ORDER — BUDESONIDE, MICRONIZED 100 %
0.5 POWDER (GRAM) MISCELLANEOUS EVERY 12 HOURS
Refills: 0 | Status: DISCONTINUED | OUTPATIENT
Start: 2020-01-16 | End: 2020-01-18

## 2020-01-16 RX ORDER — METHYLPHENIDATE HCL 5 MG
18 TABLET ORAL EVERY MORNING
Refills: 0 | Status: DISCONTINUED | OUTPATIENT
Start: 2020-01-16 | End: 2020-01-18

## 2020-01-16 RX ORDER — SODIUM,POTASSIUM PHOSPHATES 278-250MG
2 POWDER IN PACKET (EA) ORAL
Refills: 0 | Status: COMPLETED | OUTPATIENT
Start: 2020-01-16 | End: 2020-01-18

## 2020-01-16 RX ADMIN — Medication 3 MILLILITER(S): at 00:48

## 2020-01-16 RX ADMIN — HEPARIN SODIUM 5000 UNIT(S): 5000 INJECTION INTRAVENOUS; SUBCUTANEOUS at 05:05

## 2020-01-16 RX ADMIN — SODIUM CHLORIDE 150 MILLILITER(S): 9 INJECTION INTRAMUSCULAR; INTRAVENOUS; SUBCUTANEOUS at 17:57

## 2020-01-16 RX ADMIN — Medication 50 MILLIGRAM(S): at 05:06

## 2020-01-16 RX ADMIN — Medication 1 MILLIGRAM(S): at 22:09

## 2020-01-16 RX ADMIN — Medication 75 MILLIGRAM(S): at 17:56

## 2020-01-16 RX ADMIN — OXYCODONE HYDROCHLORIDE 15 MILLIGRAM(S): 5 TABLET ORAL at 09:58

## 2020-01-16 RX ADMIN — Medication 3 MILLILITER(S): at 21:09

## 2020-01-16 RX ADMIN — SODIUM CHLORIDE 150 MILLILITER(S): 9 INJECTION INTRAMUSCULAR; INTRAVENOUS; SUBCUTANEOUS at 11:40

## 2020-01-16 RX ADMIN — OXYCODONE HYDROCHLORIDE 15 MILLIGRAM(S): 5 TABLET ORAL at 22:09

## 2020-01-16 RX ADMIN — ESCITALOPRAM OXALATE 20 MILLIGRAM(S): 10 TABLET, FILM COATED ORAL at 11:40

## 2020-01-16 RX ADMIN — Medication 75 MILLIGRAM(S): at 05:01

## 2020-01-16 RX ADMIN — Medication 2 PACKET(S): at 17:56

## 2020-01-16 RX ADMIN — HEPARIN SODIUM 5000 UNIT(S): 5000 INJECTION INTRAVENOUS; SUBCUTANEOUS at 13:44

## 2020-01-16 RX ADMIN — Medication 0.5 MILLIGRAM(S): at 21:09

## 2020-01-16 RX ADMIN — Medication 3 MILLILITER(S): at 13:50

## 2020-01-16 RX ADMIN — OXYCODONE HYDROCHLORIDE 15 MILLIGRAM(S): 5 TABLET ORAL at 01:13

## 2020-01-16 RX ADMIN — OXYCODONE HYDROCHLORIDE 15 MILLIGRAM(S): 5 TABLET ORAL at 15:54

## 2020-01-16 RX ADMIN — Medication 3 MILLILITER(S): at 07:35

## 2020-01-16 RX ADMIN — Medication 100 MILLIGRAM(S): at 22:09

## 2020-01-16 RX ADMIN — OXYCODONE HYDROCHLORIDE 15 MILLIGRAM(S): 5 TABLET ORAL at 08:30

## 2020-01-16 RX ADMIN — PANTOPRAZOLE SODIUM 40 MILLIGRAM(S): 20 TABLET, DELAYED RELEASE ORAL at 05:06

## 2020-01-16 RX ADMIN — Medication 18 MILLIGRAM(S): at 08:06

## 2020-01-16 RX ADMIN — HEPARIN SODIUM 5000 UNIT(S): 5000 INJECTION INTRAVENOUS; SUBCUTANEOUS at 22:10

## 2020-01-16 RX ADMIN — SODIUM CHLORIDE 150 MILLILITER(S): 9 INJECTION INTRAMUSCULAR; INTRAVENOUS; SUBCUTANEOUS at 05:01

## 2020-01-16 NOTE — PROVIDER CONTACT NOTE (OTHER) - SITUATION
Dr. Cai   - spoke to [facility] at service regarding pulmonary consult.
DR. ABIMAEL MA MD IS AWARE.

## 2020-01-16 NOTE — PROGRESS NOTE ADULT - SUBJECTIVE AND OBJECTIVE BOX
History of Present Illness:  Reason for Admission: sob  History of Present Illness:   49/M with PMHx of HTN, spinal fusion, abdominal hernia, chronic back pain, GERD, Lyme disease, RA, sleep apnea, knee reconstruction presents to the ED c/o SOB x1 day. Pt reports feeling SOB for the past few weeks but it worsened today. States he was feeling weak and SOB so he went to urgent care, was found to be hypotensive, EMS was called and they gave pt 700 ml of saline and brought to the ED for further evaluation.   In ED , he was found to have Flu A with initial BP in high 70s low 80s. He was given iv fluids and BP normalized.     - patient has + cough and feeling fatigued. patient says SOB has improved slightly.     ROS:   All 10 systems reviewed and found to be negative with the exception of what has been described above.    Home Medications:   * Patient Currently Takes Medications as of 15-Melvin-2020 21:49 documented in Structured Notes  · 	escitalopram 20 mg oral tablet: Last Dose Taken:  , 1 tab(s) orally once a day  · 	metoprolol succinate 50 mg oral tablet, extended release: Last Dose Taken:  , 1 tab(s) orally once a day  · 	traZODone 100 mg oral tablet: Last Dose Taken:  , 1 tab(s) orally once a day (at bedtime)  · 	clonazePAM 1 mg oral tablet: Last Dose Taken:  , 1 tab(s) orally 2 times a day, As Needed  · 	Concerta 18 mg/24 hr oral tablet, extended release: Last Dose Taken:  , 1 tab(s) orally once a day (in the morning)  · 	hydroCHLOROthiazide 25 mg oral tablet: Last Dose Taken:  , 1 tab(s) orally once a day  · 	losartan 100 mg oral tablet: Last Dose Taken:  , 1 tab(s) orally once a day  · 	oxyCODONE 15 mg oral tablet: Last Dose Taken:  , 1 tab(s) orally every 6 hours  · 	omeprazole 20 mg oral delayed release capsule: Last Dose Taken:  , 1 cap(s) orally once a day, As Needed - for indigestion  · 	Advil 200 mg oral tablet: Last Dose Taken:  , 3 tab(s) orally every 6 hours, As Needed - for moderate pain  · 	Ventolin HFA 90 mcg/inh inhalation aerosol: Last Dose Taken:  , 2 puff(s) inhaled 4 times a day, As Needed - for shortness of breath and/or wheezing        PHYSICAL EXAM:    Vital Signs Last 24 Hrs  T(C): 37 (2020 10:56), Max: 37.6 (2020 05:09)  T(F): 98.6 (2020 10:56), Max: 99.6 (2020 05:09)  HR: 72 (2020 10:56) (69 - 91)  BP: 116/69 (2020 10:56) (77/45 - 127/66)  BP(mean): --  RR: 18 (2020 10:56) (15 - 18)  SpO2: 97% (2020 10:56) (92% - 100%)    GEN: lying in bed, NAD  HEENT:   NC/AT, pupils equal and reactive, EOMI  CV:  +S1, +S2, RRR  RESP:   rhonchi heard on auscultation b/l , no wheeze  BREAST:  not examined  GI:  abdomen soft, non-tender, non-distended, normoactive BS  RECTAL:  not examined  :  not examined  MSK:   normal muscle tone  EXT:  no edema  NEURO:  AAOX3, no focal neurological deficits  SKIN:  no rashes                          11.6   5.89  )-----------( 73       ( 2020 10:03 )             34.9     01-16    136  |  105  |  18  ----------------------------<  120<H>  4.5   |  27  |  1.26    Ca    7.9<L>      2020 10:03  Phos  1.9     01-16  Mg     1.9     01-16    TPro  6.6  /  Alb  3.2<L>  /  TBili  0.9  /  DBili  x   /  AST  95<H>  /  ALT  59  /  AlkPhos  63  01-15    CARDIAC MARKERS ( 15 Melvin 2020 15:52 )  <0.015 ng/mL / x     / 152 U/L / x     / x          LIVER FUNCTIONS - ( 15 Melvin 2020 15:52 )  Alb: 3.2 g/dL / Pro: 6.6 gm/dL / ALK PHOS: 63 U/L / ALT: 59 U/L / AST: 95 U/L / GGT: x           PT/INR - ( 15 Melvin 2020 15:52 )   PT: 14.8 sec;   INR: 1.32 ratio         PTT - ( 15 Melvin 2020 15:52 )  PTT:35.5 sec  Urinalysis Basic - ( 15 Melvin 2020 19:50 )    Color: Yellow / Appearance: Clear / S.010 / pH: x  Gluc: x / Ketone: Negative  / Bili: Negative / Urobili: 1 mg/dL   Blood: x / Protein: Negative mg/dL / Nitrite: Negative   Leuk Esterase: Negative / RBC: x / WBC x   Sq Epi: x / Non Sq Epi: x / Bacteria: x        Lactate, Blood: 1.5 mmol/L (01-15 @ 15:52)      Assessment and Plan:    Assessment:  · Assessment	  49/M with PMHx of HTN, spinal fusion, abdominal hernia, chronic back pain, GERD, Lyme disease, RA, sleep apnea, knee reconstruction admitted with     1) SOB sec to Flu A:  - supportive O2. monitor o2 and activity as tolerated   - Aerosols with Duoneb/Budesonide  - as per pulm consider CTA Chest when Creatinine improves-check BMP today    2) Influenza A: pt appears weak and washed out from Flu  iv fluids  tamiflu 75 mg bid  droplet precautions    3) Hypotensive Episode: resolved with iv fluids  cont iv fluids  hold losartan and HCTZ  cont BB with holding parameters    4) LESLI: sec to dehydration and HCTZ  d/c HCTZ  iv fluids  if not improving or worsening, then, renal consult    5) HTN: cont BB  hold losartan and hctz    6) Hyponatremia 132: iv fluids    7) DVT PPX: heparin s/q    poc discussed with pt, team

## 2020-01-16 NOTE — CONSULT NOTE ADULT - ASSESSMENT
Assessment/Plan    - Maintain IV fluids  - Continue Tamiflu  - Consider CTA Chest when Creatinine improves-check BMP today  - Aerosols with Duoneb/Budesonide  - Monitor O2 sats- Activity as dany

## 2020-01-16 NOTE — PROGRESS NOTE ADULT - ATTENDING COMMENTS
patient seen and examined with PA student Teressa Richardson. I was physically present for the key portions of the evaluation and management (E/M) service provided.  I agree with the above history, physical, and plan which I have reviewed and edited where appropriate.   - plan for LE doppler  - case d/w dr browning and will check Ct angio tomorrow if cr improves  - macrocytic anemia and thrombocytopenia - check b12 folate, d/w dr browning for w/u as outpt  - continue tamiflu  - continue IVF

## 2020-01-16 NOTE — CONSULT NOTE ADULT - SUBJECTIVE AND OBJECTIVE BOX
HPI:  49/M with PMHx of HTN, spinal fusion, abdominal hernia, chronic back pain, GERD, Lyme disease, RA, sleep apnea, knee reconstruction presents to the ED c/o SOB x1 day. Pt reports feeling SOB for the past few weeks but it worsened today. States he was feeling weak and SOB so he went to urgent care, was found to be hypotensive, EMS was called and they gave pt 700 ml of saline and brought to the ED for further evaluation.     In ED , he was found to have Flu A with initial BP in high 70s low 80s. He was given iv fluids and BP normalized. (15 Melvin 2020 21:40)    Patient states that he had influenza earlier in the season, treated with Tamiflu. He recovered and developed symptoms again. Now with generalized myalgias. He has a non-productive cough. He denies pleuritic chest pain or hemoptysis. No h/o cigarette smoking.      PAST MEDICAL & SURGICAL HISTORY:  HTN (hypertension)  Umbilical hernia  GERD (gastroesophageal reflux disease)  Chronic back pain  Sleep apnea, unspecified type  RA (rheumatoid arthritis)  Lyme disease  H/O spinal fusion:   History of total right knee replacement      MEDICATIONS  (STANDING):  albuterol/ipratropium for Nebulization 3 milliLiter(s) Nebulizer every 6 hours  buDESOnide    Inhalation Suspension 0.5 milliGRAM(s) Inhalation every 12 hours  escitalopram 20 milliGRAM(s) Oral daily  heparin  Injectable 5000 Unit(s) SubCutaneous every 8 hours  influenza   Vaccine 0.5 milliLiter(s) IntraMuscular once  methylphenidate ER (CONCERTA) 18 milliGRAM(s) Oral every morning  metoprolol succinate ER 50 milliGRAM(s) Oral daily  oseltamivir 75 milliGRAM(s) Oral two times a day  pantoprazole    Tablet 40 milliGRAM(s) Oral before breakfast  sodium chloride 0.9%. 1000 milliLiter(s) (150 mL/Hr) IV Continuous <Continuous>  traZODone 100 milliGRAM(s) Oral at bedtime    MEDICATIONS  (PRN):  acetaminophen   Tablet .. 650 milliGRAM(s) Oral every 6 hours PRN Mild Pain (1 - 3)  clonazePAM  Tablet 1 milliGRAM(s) Oral two times a day PRN anxiety  oxyCODONE    IR 15 milliGRAM(s) Oral every 6 hours PRN Moderate Pain (4 - 6)      Allergies    penicillins (Unknown)    Intolerances        SOCIAL HISTORY: Denies tobacco, etoh abuse or illicit drug use    FAMILY HISTORY:  Family history of hypertension: parent      Vital Signs Last 24 Hrs  T(C): 37.6 (2020 05:09), Max: 37.6 (2020 05:09)  T(F): 99.6 (2020 05:09), Max: 99.6 (2020 05:09)  HR: 86 (2020 05:09) (69 - 91)  BP: 127/66 (2020 05:09) (77/45 - 127/66)  BP(mean): --  RR: 18 (2020 05:09) (15 - 18)  SpO2: 100% (2020 05:09) (92% - 100%)    REVIEW OF SYSTEMS:    CONSTITUTIONAL:  As per HPI.  HEENT:  Eyes:  No diplopia or blurred vision. ENT:  No earache, sore throat or runny nose.  CARDIOVASCULAR:  No pressure, squeezing, tightness, heaviness or aching about the chest, neck, axilla or epigastrium.  RESPIRATORY:  See HPI  GASTROINTESTINAL:  No nausea, vomiting or diarrhea.  GENITOURINARY:  No dysuria, frequency or urgency.  MUSCULOSKELETAL:  As per HPI.  SKIN:  No change in skin, hair or nails.  NEUROLOGIC:  No paresthesias, fasciculations, seizures or weakness.  PSYCHIATRIC:  No disorder of thought or mood.  ENDOCRINE:  No heat or cold intolerance, polyuria or polydipsia.  HEMATOLOGICAL:  No easy bruising or bleedings:  .     PHYSICAL EXAMINATION:    GENERAL APPEARANCE:  Pt. is not currently dyspneic, in no distress. Pt. is alert, oriented, and pleasant.  HEENT:  Pupils are normal and react normally. No icterus. Mucous membranes well colored.  NECK:  Supple. No lymphadenopathy. Jugular venous pressure not elevated.   HEART:   The cardiac impulse has a normal quality. Regular. Normal S1 and S2. There are no murmurs, rubs or gallops noted  CHEST:  Chest with scattered coarse BS. Sl end exp wheeze. Normal respiratory effort.  ABDOMEN:  Soft and nontender.   EXTREMITIES:  There is no cyanosis, clubbing or edema. No palpable cords   SKIN:  No rash or significant lesions are noted.    LABS:                        11.1   7.16  )-----------( 77       ( 15 Melvin 2020 15:52 )             33.7     01-15    132<L>  |  101  |  29<H>  ----------------------------<  128<H>  5.2   |  27  |  2.04<H>    Ca    7.8<L>      15 Melvin 2020 15:52    TPro  6.6  /  Alb  3.2<L>  /  TBili  0.9  /  DBili  x   /  AST  95<H>  /  ALT  59  /  AlkPhos  63  01-15    LIVER FUNCTIONS - ( 15 Melvin 2020 15:52 )  Alb: 3.2 g/dL / Pro: 6.6 gm/dL / ALK PHOS: 63 U/L / ALT: 59 U/L / AST: 95 U/L / GGT: x           PT/INR - ( 15 Melvin 2020 15:52 )   PT: 14.8 sec;   INR: 1.32 ratio         PTT - ( 15 Melvin 2020 15:52 )  PTT:35.5 sec  CARDIAC MARKERS ( 15 Melvin 2020 15:52 )  <0.015 ng/mL / x     / 152 U/L / x     / x          Urinalysis Basic - ( 15 Melvin 2020 19:50 )    Color: Yellow / Appearance: Clear / S.010 / pH: x  Gluc: x / Ketone: Negative  / Bili: Negative / Urobili: 1 mg/dL   Blood: x / Protein: Negative mg/dL / Nitrite: Negative   Leuk Esterase: Negative / RBC: x / WBC x   Sq Epi: x / Non Sq Epi: x / Bacteria: x          RADIOLOGY & ADDITIONAL STUDIES:< from: Xray Chest 1 View-PORTABLE IMMEDIATE (01.15.20 @ 16:50) >  EXAM:  XR CHEST PORTABLE IMMED 1V                            PROCEDURE DATE:  01/15/2020          INTERPRETATION:  AP erect chest on January 15, 2020 4:20 PM. Patient has sepsis, difficulty breathing, and hypertension.    Heart is magnified by technique.    The lung fields and pleural surfaces are unremarkable.    Slight left base scar again noted.    Chest is similar to 2019.    IMPRESSION: No acute finding or change.      RINA SCHWARTZ

## 2020-01-17 LAB
ANION GAP SERPL CALC-SCNC: 3 MMOL/L — LOW (ref 5–17)
BUN SERPL-MCNC: 16 MG/DL — SIGNIFICANT CHANGE UP (ref 7–23)
CALCIUM SERPL-MCNC: 8.4 MG/DL — LOW (ref 8.5–10.1)
CHLORIDE SERPL-SCNC: 105 MMOL/L — SIGNIFICANT CHANGE UP (ref 96–108)
CO2 SERPL-SCNC: 28 MMOL/L — SIGNIFICANT CHANGE UP (ref 22–31)
CREAT SERPL-MCNC: 1.03 MG/DL — SIGNIFICANT CHANGE UP (ref 0.5–1.3)
FOLATE SERPL-MCNC: 12.9 NG/ML — SIGNIFICANT CHANGE UP
GLUCOSE SERPL-MCNC: 109 MG/DL — HIGH (ref 70–99)
HCT VFR BLD CALC: 34.9 % — LOW (ref 39–50)
HGB BLD-MCNC: 11.5 G/DL — LOW (ref 13–17)
MCHC RBC-ENTMCNC: 33 GM/DL — SIGNIFICANT CHANGE UP (ref 32–36)
MCHC RBC-ENTMCNC: 33.1 PG — SIGNIFICANT CHANGE UP (ref 27–34)
MCV RBC AUTO: 100.6 FL — HIGH (ref 80–100)
PLATELET # BLD AUTO: 70 K/UL — LOW (ref 150–400)
POTASSIUM SERPL-MCNC: 4.5 MMOL/L — SIGNIFICANT CHANGE UP (ref 3.5–5.3)
POTASSIUM SERPL-SCNC: 4.5 MMOL/L — SIGNIFICANT CHANGE UP (ref 3.5–5.3)
RBC # BLD: 3.47 M/UL — LOW (ref 4.2–5.8)
RBC # FLD: 13 % — SIGNIFICANT CHANGE UP (ref 10.3–14.5)
SODIUM SERPL-SCNC: 136 MMOL/L — SIGNIFICANT CHANGE UP (ref 135–145)
VIT B12 SERPL-MCNC: 610 PG/ML — SIGNIFICANT CHANGE UP (ref 232–1245)
WBC # BLD: 4.49 K/UL — SIGNIFICANT CHANGE UP (ref 3.8–10.5)
WBC # FLD AUTO: 4.49 K/UL — SIGNIFICANT CHANGE UP (ref 3.8–10.5)

## 2020-01-17 PROCEDURE — 71275 CT ANGIOGRAPHY CHEST: CPT | Mod: 26

## 2020-01-17 PROCEDURE — 93970 EXTREMITY STUDY: CPT | Mod: 26

## 2020-01-17 PROCEDURE — 99233 SBSQ HOSP IP/OBS HIGH 50: CPT

## 2020-01-17 RX ORDER — SODIUM CHLORIDE 9 MG/ML
1000 INJECTION INTRAMUSCULAR; INTRAVENOUS; SUBCUTANEOUS
Refills: 0 | Status: DISCONTINUED | OUTPATIENT
Start: 2020-01-17 | End: 2020-01-18

## 2020-01-17 RX ADMIN — Medication 2 PACKET(S): at 05:49

## 2020-01-17 RX ADMIN — Medication 3 MILLILITER(S): at 01:19

## 2020-01-17 RX ADMIN — OXYCODONE HYDROCHLORIDE 15 MILLIGRAM(S): 5 TABLET ORAL at 14:18

## 2020-01-17 RX ADMIN — Medication 3 MILLILITER(S): at 21:43

## 2020-01-17 RX ADMIN — Medication 75 MILLIGRAM(S): at 18:11

## 2020-01-17 RX ADMIN — Medication 75 MILLIGRAM(S): at 05:48

## 2020-01-17 RX ADMIN — Medication 18 MILLIGRAM(S): at 05:48

## 2020-01-17 RX ADMIN — Medication 40 MILLIGRAM(S): at 14:06

## 2020-01-17 RX ADMIN — ESCITALOPRAM OXALATE 20 MILLIGRAM(S): 10 TABLET, FILM COATED ORAL at 12:15

## 2020-01-17 RX ADMIN — Medication 100 MILLIGRAM(S): at 22:02

## 2020-01-17 RX ADMIN — SODIUM CHLORIDE 150 MILLILITER(S): 9 INJECTION INTRAMUSCULAR; INTRAVENOUS; SUBCUTANEOUS at 12:16

## 2020-01-17 RX ADMIN — Medication 1 MILLIGRAM(S): at 14:05

## 2020-01-17 RX ADMIN — OXYCODONE HYDROCHLORIDE 15 MILLIGRAM(S): 5 TABLET ORAL at 05:59

## 2020-01-17 RX ADMIN — Medication 0.5 MILLIGRAM(S): at 08:51

## 2020-01-17 RX ADMIN — OXYCODONE HYDROCHLORIDE 15 MILLIGRAM(S): 5 TABLET ORAL at 23:00

## 2020-01-17 RX ADMIN — Medication 0.5 MILLIGRAM(S): at 21:42

## 2020-01-17 RX ADMIN — Medication 2 PACKET(S): at 18:12

## 2020-01-17 RX ADMIN — HEPARIN SODIUM 5000 UNIT(S): 5000 INJECTION INTRAVENOUS; SUBCUTANEOUS at 05:48

## 2020-01-17 RX ADMIN — SODIUM CHLORIDE 75 MILLILITER(S): 9 INJECTION INTRAMUSCULAR; INTRAVENOUS; SUBCUTANEOUS at 18:12

## 2020-01-17 RX ADMIN — HEPARIN SODIUM 5000 UNIT(S): 5000 INJECTION INTRAVENOUS; SUBCUTANEOUS at 14:06

## 2020-01-17 RX ADMIN — Medication 3 MILLILITER(S): at 14:09

## 2020-01-17 RX ADMIN — PANTOPRAZOLE SODIUM 40 MILLIGRAM(S): 20 TABLET, DELAYED RELEASE ORAL at 05:51

## 2020-01-17 RX ADMIN — Medication 40 MILLIGRAM(S): at 10:51

## 2020-01-17 RX ADMIN — Medication 50 MILLIGRAM(S): at 05:49

## 2020-01-17 RX ADMIN — OXYCODONE HYDROCHLORIDE 15 MILLIGRAM(S): 5 TABLET ORAL at 22:02

## 2020-01-17 RX ADMIN — Medication 40 MILLIGRAM(S): at 22:01

## 2020-01-17 RX ADMIN — Medication 3 MILLILITER(S): at 08:51

## 2020-01-17 NOTE — PROGRESS NOTE ADULT - ATTENDING COMMENTS
patient seen and examined with PA student Teressa Richardson. I was physically present for the key portions of the evaluation and management (E/M) service provided.  I agree with the above history, physical, and plan which I have reviewed and edited where appropriate.   - plan for LE doppler  - case d/w dr browning and will check Ct angio tomorrow if cr improves  - macrocytic anemia and thrombocytopenia - check b12 folate, d/w dr browning for w/u as outpt  - continue tamiflu  - continue IVF patient seen and examined with PA student Teressa Richardson. I was physically present for the key portions of the evaluation and management (E/M) service provided.  I agree with the above history, physical, and plan which I have reviewed and edited where appropriate.   - cr improved.  LE doppler negative and ct chest negative.  for outpt f/u in 6 months. pt aware.  to f/u with dr browning  - macrocytic anemia and thrombocytopenia - check b12 folate, d/w dr browning for w/u as outpt  - continue tamiflu  - dc IVF  - continue steroids

## 2020-01-17 NOTE — PROGRESS NOTE ADULT - SUBJECTIVE AND OBJECTIVE BOX
Subjective:    Awake, alert. Loose, non-prod cough. Still breathless.    MEDICATIONS  (STANDING):  albuterol/ipratropium for Nebulization 3 milliLiter(s) Nebulizer every 6 hours  buDESOnide    Inhalation Suspension 0.5 milliGRAM(s) Inhalation every 12 hours  escitalopram 20 milliGRAM(s) Oral daily  heparin  Injectable 5000 Unit(s) SubCutaneous every 8 hours  influenza   Vaccine 0.5 milliLiter(s) IntraMuscular once  methylphenidate ER (CONCERTA) 18 milliGRAM(s) Oral every morning  methylPREDNISolone sodium succinate Injectable 40 milliGRAM(s) IV Push every 8 hours  metoprolol succinate ER 50 milliGRAM(s) Oral daily  oseltamivir 75 milliGRAM(s) Oral two times a day  pantoprazole    Tablet 40 milliGRAM(s) Oral before breakfast  potassium phosphate / sodium phosphate powder 2 Packet(s) Oral two times a day  sodium chloride 0.9%. 1000 milliLiter(s) (150 mL/Hr) IV Continuous <Continuous>  traZODone 100 milliGRAM(s) Oral at bedtime    MEDICATIONS  (PRN):  acetaminophen   Tablet .. 650 milliGRAM(s) Oral every 6 hours PRN Mild Pain (1 - 3)  clonazePAM  Tablet 1 milliGRAM(s) Oral two times a day PRN anxiety  oxyCODONE    IR 15 milliGRAM(s) Oral every 6 hours PRN Moderate Pain (4 - 6)      Allergies    penicillins (Unknown)    Intolerances        Vital Signs Last 24 Hrs  T(C): 36.4 (2020 05:43), Max: 37.1 (2020 17:29)  T(F): 97.6 (2020 05:43), Max: 98.7 (2020 17:29)  HR: 77 (2020 08:54) (63 - 78)  BP: 143/73 (2020 05:43) (116/69 - 143/73)  BP(mean): --  RR: 18 (2020 05:43) (18 - 18)  SpO2: 99% (2020 05:43) (97% - 99%)    PHYSICAL EXAMINATION:    NECK:  Supple. No lymphadenopathy. Jugular venous pressure not elevated.   HEART:   The cardiac impulse has a normal quality. Reg., Nl S1 and S2.    CHEST:  Chest scattered coarse BS bilat with exp wheezes. Normal respiratory effort.  ABDOMEN:  Soft and nontender.   EXTREMITIES:  There is no edema. No cords      LABS:                        11.5   4.49  )-----------( 70       ( 2020 08:40 )             34.9     -    136  |  105  |  16  ----------------------------<  109<H>  4.5   |  28  |  1.03    Ca    8.4<L>      2020 08:40  Phos  1.9     -16  Mg     1.9     -16    TPro  6.6  /  Alb  3.2<L>  /  TBili  0.9  /  DBili  x   /  AST  95<H>  /  ALT  59  /  AlkPhos  63  -15    PT/INR - ( 15 Melvin 2020 15:52 )   PT: 14.8 sec;   INR: 1.32 ratio         PTT - ( 15 Melvin 2020 15:52 )  PTT:35.5 sec  Urinalysis Basic - ( 15 Melvin 2020 19:50 )    Color: Yellow / Appearance: Clear / S.010 / pH: x  Gluc: x / Ketone: Negative  / Bili: Negative / Urobili: 1 mg/dL   Blood: x / Protein: Negative mg/dL / Nitrite: Negative   Leuk Esterase: Negative / RBC: x / WBC x   Sq Epi: x / Non Sq Epi: x / Bacteria: x        RADIOLOGY & ADDITIONAL TESTS:    Assessment and Recommendation:   · Assessment		  Assessment/Plan    - Maintain IV fluids  - Continue Tamiflu  - CTA Chest today--r/o PE, post influenza infiltrate  - Aerosols with Duoneb/Budesonide  - Monitor O2 sats- Activity as dany  - Start trial of solumedrol to minimize airway reactivity    Problem/Recommendation - 1:  Problem: Influenza A.    Problem/Recommendation - 2:  ·  Problem: Cough.     Problem/Recommendation - 3:  ·  Problem: Sleep apnea, unspecified type.     Problem/Recommendation - 4:  ·  Problem: Acute kidney injury.

## 2020-01-17 NOTE — PROGRESS NOTE ADULT - SUBJECTIVE AND OBJECTIVE BOX
History of Present Illness:  Reason for Admission: sob  History of Present Illness:   49/M with PMHx of HTN, spinal fusion, abdominal hernia, chronic back pain, GERD, Lyme disease, RA, sleep apnea, knee reconstruction presents to the ED c/o SOB x1 day. Pt reports feeling SOB for the past few weeks but it worsened today. States he was feeling weak and SOB so he went to urgent care, was found to be hypotensive, EMS was called and they gave pt 700 ml of saline and brought to the ED for further evaluation.   In ED , he was found to have Flu A with initial BP in high 70s low 80s. He was given iv fluids and BP normalized.     - patient has + cough and feeling fatigued. patient says SOB has improved slightly.    - SOB and cough present producing clear sputum.     ROS:   All 10 systems reviewed and found to be negative with the exception of what has been described above.    Home Medications:   * Patient Currently Takes Medications as of 15-Melvin-2020 21:49 documented in Structured Notes  · 	escitalopram 20 mg oral tablet: Last Dose Taken:  , 1 tab(s) orally once a day  · 	metoprolol succinate 50 mg oral tablet, extended release: Last Dose Taken:  , 1 tab(s) orally once a day  · 	traZODone 100 mg oral tablet: Last Dose Taken:  , 1 tab(s) orally once a day (at bedtime)  · 	clonazePAM 1 mg oral tablet: Last Dose Taken:  , 1 tab(s) orally 2 times a day, As Needed  · 	Concerta 18 mg/24 hr oral tablet, extended release: Last Dose Taken:  , 1 tab(s) orally once a day (in the morning)  · 	hydroCHLOROthiazide 25 mg oral tablet: Last Dose Taken:  , 1 tab(s) orally once a day  · 	losartan 100 mg oral tablet: Last Dose Taken:  , 1 tab(s) orally once a day  · 	oxyCODONE 15 mg oral tablet: Last Dose Taken:  , 1 tab(s) orally every 6 hours  · 	omeprazole 20 mg oral delayed release capsule: Last Dose Taken:  , 1 cap(s) orally once a day, As Needed - for indigestion  · 	Advil 200 mg oral tablet: Last Dose Taken:  , 3 tab(s) orally every 6 hours, As Needed - for moderate pain  · 	Ventolin HFA 90 mcg/inh inhalation aerosol: Last Dose Taken:  , 2 puff(s) inhaled 4 times a day, As Needed - for shortness of breath and/or wheezing        PHYSICAL EXAM:    Vital Signs Last 24 Hrs  T(C): 36.9 (2020 11:39), Max: 37.1 (2020 17:29)  T(F): 98.5 (2020 11:39), Max: 98.7 (2020 17:29)  HR: 73 (2020 11:39) (63 - 78)  BP: 144/77 (2020 11:39) (119/74 - 144/77)  BP(mean): --  RR: 18 (2020 11:39) (18 - 18)  SpO2: 100% (2020 11:39) (97% - 100%)    GEN: lying in bed, NAD  HEENT:   NC/AT, pupils equal and reactive, EOMI  CV:  +S1, +S2, RRR  RESP:   rhonchi heard on auscultation b/l , no wheeze or rales  BREAST:  not examined  GI:  abdomen soft, non-tender, non-distended, normoactive BS  RECTAL:  not examined  :  not examined  MSK:   normal muscle tone  EXT:  no edema  NEURO:  AAOX3, no focal neurological deficits  SKIN:  no rashes                          11.5   4.49  )-----------( 70       ( 2020 08:40 )             34.9     01-17    136  |  105  |  16  ----------------------------<  109<H>  4.5   |  28  |  1.03    Ca    8.4<L>      2020 08:40  Phos  1.9     01-16  Mg     1.9     01-16    TPro  6.6  /  Alb  3.2<L>  /  TBili  0.9  /  DBili  x   /  AST  95<H>  /  ALT  59  /  AlkPhos  63  01-15    CARDIAC MARKERS ( 15 Melvin 2020 15:52 )  <0.015 ng/mL / x     / 152 U/L / x     / x          LIVER FUNCTIONS - ( 15 Melvin 2020 15:52 )  Alb: 3.2 g/dL / Pro: 6.6 gm/dL / ALK PHOS: 63 U/L / ALT: 59 U/L / AST: 95 U/L / GGT: x           PT/INR - ( 15 Melvin 2020 15:52 )   PT: 14.8 sec;   INR: 1.32 ratio         PTT - ( 15 Melvin 2020 15:52 )  PTT:35.5 sec  Urinalysis Basic - ( 15 Melvin 2020 19:50 )    Color: Yellow / Appearance: Clear / S.010 / pH: x  Gluc: x / Ketone: Negative  / Bili: Negative / Urobili: 1 mg/dL   Blood: x / Protein: Negative mg/dL / Nitrite: Negative   Leuk Esterase: Negative / RBC: x / WBC x   Sq Epi: x / Non Sq Epi: x / Bacteria: x              Assessment and Plan:    Assessment:	  49/M with PMHx of HTN, spinal fusion, abdominal hernia, chronic back pain, GERD, Lyme disease, RA, sleep apnea, knee reconstruction admitted with     1) SOB sec to Flu A:  - supportive O2. monitor o2 and activity as tolerated   - Aerosols with Duoneb/Budesonide  - US LE b/l doppler is negative for DVT and CT chest negative for PE.  - CT chest shows 4 mm KILEY groundglass opacity. A 6-12 month follow up chest CT recommended     2) Influenza A: pt appears weak and washed out from Flu  tamiflu 75 mg bid  droplet precautions    3) Hypotensive Episode: resolved with iv fluids  cont iv fluids  hold losartan and HCTZ  cont BB with holding parameters    4) LESLI: sec to dehydration and HCTZ  d/c HCTZ  iv fluids  kidney function improving     5) HTN: cont BB  hold losartan and hctz    6) Hyponatremia 132: iv fluids    7) DVT PPX: heparin s/q    poc discussed with pt, team

## 2020-01-18 ENCOUNTER — TRANSCRIPTION ENCOUNTER (OUTPATIENT)
Age: 50
End: 2020-01-18

## 2020-01-18 VITALS
DIASTOLIC BLOOD PRESSURE: 72 MMHG | SYSTOLIC BLOOD PRESSURE: 128 MMHG | RESPIRATION RATE: 20 BRPM | TEMPERATURE: 98 F | HEART RATE: 57 BPM | OXYGEN SATURATION: 99 %

## 2020-01-18 RX ADMIN — Medication 18 MILLIGRAM(S): at 05:55

## 2020-01-18 RX ADMIN — OXYCODONE HYDROCHLORIDE 15 MILLIGRAM(S): 5 TABLET ORAL at 05:42

## 2020-01-18 RX ADMIN — PANTOPRAZOLE SODIUM 40 MILLIGRAM(S): 20 TABLET, DELAYED RELEASE ORAL at 05:38

## 2020-01-18 RX ADMIN — Medication 2 PACKET(S): at 05:37

## 2020-01-18 RX ADMIN — OXYCODONE HYDROCHLORIDE 15 MILLIGRAM(S): 5 TABLET ORAL at 06:30

## 2020-01-18 RX ADMIN — Medication 3 MILLILITER(S): at 02:28

## 2020-01-18 RX ADMIN — SODIUM CHLORIDE 75 MILLILITER(S): 9 INJECTION INTRAMUSCULAR; INTRAVENOUS; SUBCUTANEOUS at 05:37

## 2020-01-18 RX ADMIN — Medication 40 MILLIGRAM(S): at 05:37

## 2020-01-18 RX ADMIN — Medication 75 MILLIGRAM(S): at 05:37

## 2020-01-18 NOTE — DISCHARGE NOTE PROVIDER - NSDCFUSCHEDAPPT_GEN_ALL_CORE_FT
ARTEM SIMS ; 02/12/2020 ; NPP IntMed [address] [facility]  ARTEM SIMS ; 03/03/2020 ; NPP Urology [address] [facility]

## 2020-01-18 NOTE — DISCHARGE NOTE PROVIDER - NSDCMRMEDTOKEN_GEN_ALL_CORE_FT
Advil 200 mg oral tablet: 3 tab(s) orally every 6 hours, As Needed - for moderate pain  clonazePAM 1 mg oral tablet: 1 tab(s) orally 2 times a day, As Needed  Concerta 18 mg/24 hr oral tablet, extended release: 1 tab(s) orally once a day (in the morning)  escitalopram 20 mg oral tablet: 1 tab(s) orally once a day  hydroCHLOROthiazide 25 mg oral tablet: 1 tab(s) orally once a day  losartan 100 mg oral tablet: 1 tab(s) orally once a day  metoprolol succinate 50 mg oral tablet, extended release: 1 tab(s) orally once a day  omeprazole 20 mg oral delayed release capsule: 1 cap(s) orally once a day, As Needed - for indigestion  oseltamivir 75 mg oral capsule: 1 cap(s) orally 2 times a day  oxyCODONE 15 mg oral tablet: 1 tab(s) orally every 6 hours  predniSONE 10 mg oral tablet: 3 tab(s) oral - orally 2 times a day x 2 days  2 tab(s) oral - orally 2 times a day x 2 days  1 tab(s) oral - orally 2 times a day x 2 days  traZODone 100 mg oral tablet: 1 tab(s) orally once a day (at bedtime)  Ventolin HFA 90 mcg/inh inhalation aerosol: 2 puff(s) inhaled 4 times a day, As Needed - for shortness of breath and/or wheezing

## 2020-01-18 NOTE — DISCHARGE NOTE PROVIDER - CARE PROVIDER_API CALL
Angel Cai)  Internal Medicine; Pulmonary Disease  [address] [place] [address]  Phone: [phone]  Fax: [phone]  Follow Up Time:

## 2020-01-18 NOTE — DISCHARGE NOTE PROVIDER - HOSPITAL COURSE
Patient is a 49y old  Male who presents with a chief complaint of sob (17 Jan 2020 14:13)        HPI:    49/M with PMHx of HTN, spinal fusion, abdominal hernia, chronic back pain, GERD, Lyme disease, RA, sleep apnea, knee reconstruction presents to the ED c/o SOB x1 day. Pt reports feeling SOB for the past few weeks but it worsened today. States he was feeling weak and SOB so he went to urgent care, was found to be hypotensive, EMS was called and they gave pt 700 ml of saline and brought to the ED for further evaluation.         In ED , he was found to have Flu A with initial BP in high 70s low 80s. He was given iv fluids and BP normalized. (15 Melvin 2020 21:40)        SUBJECTIVE & OBJECTIVE: Pt seen and examined at bedside. He is feeling better this morning. States that his cough has improved.  No fevers or acute events overnight.  Doing well at time of discharge.     PHYSICAL EXAM:    ICU Vital Signs Last 24 Hrs    T(C): 36.5 (18 Jan 2020 05:28), Max: 37.1 (17 Jan 2020 17:09)    T(F): 97.7 (18 Jan 2020 05:28), Max: 98.7 (17 Jan 2020 17:09)    HR: 57 (18 Jan 2020 05:28) (57 - 84)    BP: 128/72 (18 Jan 2020 05:28) (106/76 - 144/77)    RR: 20 (18 Jan 2020 05:28) (18 - 20)    SpO2: 99% (18 Jan 2020 05:28) (96% - 100%)    Daily   Daily I&O's Detail        17 Jan 2020 07:01  -  18 Jan 2020 07:00    --------------------------------------------------------    IN:  sodium chloride 0.9%: 1020 mL    Total IN: 1020 mL    OUT:Total OUT: 0 mL    Total NET: 1020 mL            GENERAL: NAD, well-groomed, well-developed    HEAD:  Atraumatic, Normocephalic    EYES: EOMI, PERRLA, conjunctiva and sclera clear    ENMT: Moist mucous membranes    NECK: Supple, No JVD    NERVOUS SYSTEM:  Alert & Oriented X3, Motor Strength 5/5 B/L upper and lower extremities; DTRs 2+ intact and symmetric    CHEST/LUNG: Clear to auscultation bilaterally; No rales, rhonchi, wheezing, or rubs    HEART: Regular rate and rhythm; No murmurs, rubs, or gallops    ABDOMEN: Soft, Nontender, Nondistended; Bowel sounds present    EXTREMITIES:  2+ Peripheral Pulses, No clubbing, cyanosis, or edema        MEDICATIONS  (STANDING):    albuterol/ipratropium for Nebulization 3 milliLiter(s) Nebulizer every 6 hours    buDESOnide    Inhalation Suspension 0.5 milliGRAM(s) Inhalation every 12 hours    escitalopram 20 milliGRAM(s) Oral daily    heparin  Injectable 5000 Unit(s) SubCutaneous every 8 hours    influenza   Vaccine 0.5 milliLiter(s) IntraMuscular once    methylphenidate ER (CONCERTA) 18 milliGRAM(s) Oral every morning    methylPREDNISolone sodium succinate Injectable 40 milliGRAM(s) IV Push every 8 hours    metoprolol succinate ER 50 milliGRAM(s) Oral daily    oseltamivir 75 milliGRAM(s) Oral two times a day    pantoprazole    Tablet 40 milliGRAM(s) Oral before breakfast    sodium chloride 0.9%. 1000 milliLiter(s) (75 mL/Hr) IV Continuous <Continuous>    traZODone 100 milliGRAM(s) Oral at bedtime        MEDICATIONS  (PRN):    acetaminophen   Tablet .. 650 milliGRAM(s) Oral every 6 hours PRN Mild Pain (1 - 3)    clonazePAM  Tablet 1 milliGRAM(s) Oral two times a day PRN anxiety    oxyCODONE    IR 15 milliGRAM(s) Oral every 6 hours PRN Moderate Pain (4 - 6)            LABS:                            11.5     4.49  )-----------( 70       ( 17 Jan 2020 08:40 )               34.9         01-17        136  |  105  |  16    ----------------------------<  109<H>    4.5   |  28  |  1.03        Ca    8.4<L>      17 Jan 2020 08:40    Phos  1.9     01-16    Mg     1.9     01-16        RECENT CULTURES:    01-15 @ 19:50     <10,000 CFU/mL Normal Urogenital Radha  --  --    01-15 @ 15:52     No growth to date.  --  --        Culture - Blood (01.15.20 @ 15:52)      Specimen Source: .Blood None      Culture Results:     No growth to date.

## 2020-01-18 NOTE — DISCHARGE NOTE NURSING/CASE MANAGEMENT/SOCIAL WORK - PATIENT PORTAL LINK FT
You can access the FollowMyHealth Patient Portal offered by [facility] by registering at the following website: [email or web address]. By joining [employer]’s FollowMyHealth portal, you will also be able to view your health information using other applications (apps) compatible with our system.

## 2020-01-18 NOTE — DISCHARGE NOTE PROVIDER - NSDCCPCAREPLAN_GEN_ALL_CORE_FT
PRINCIPAL DISCHARGE DIAGNOSIS  Diagnosis: Influenza A  Assessment and Plan of Treatment: Continue Tamiflu  Follow up with Dr. Cai in 2-3 days      SECONDARY DISCHARGE DIAGNOSES  Diagnosis: Acute kidney injury  Assessment and Plan of Treatment: Due to dehydration and present on admission  Continue liberal oral hydration.   follow up for repeat BMP in 1 week.

## 2020-01-20 LAB
CULTURE RESULTS: SIGNIFICANT CHANGE UP
CULTURE RESULTS: SIGNIFICANT CHANGE UP
SPECIMEN SOURCE: SIGNIFICANT CHANGE UP
SPECIMEN SOURCE: SIGNIFICANT CHANGE UP

## 2020-01-23 DIAGNOSIS — E87.1 HYPO-OSMOLALITY AND HYPONATREMIA: ICD-10-CM

## 2020-01-23 DIAGNOSIS — R06.00 DYSPNEA, UNSPECIFIED: ICD-10-CM

## 2020-01-23 DIAGNOSIS — M06.9 RHEUMATOID ARTHRITIS, UNSPECIFIED: ICD-10-CM

## 2020-01-23 DIAGNOSIS — N17.9 ACUTE KIDNEY FAILURE, UNSPECIFIED: ICD-10-CM

## 2020-01-23 DIAGNOSIS — Z98.1 ARTHRODESIS STATUS: ICD-10-CM

## 2020-01-23 DIAGNOSIS — J10.1 INFLUENZA DUE TO OTHER IDENTIFIED INFLUENZA VIRUS WITH OTHER RESPIRATORY MANIFESTATIONS: ICD-10-CM

## 2020-01-23 DIAGNOSIS — Z96.651 PRESENCE OF RIGHT ARTIFICIAL KNEE JOINT: ICD-10-CM

## 2020-01-23 DIAGNOSIS — D69.59 OTHER SECONDARY THROMBOCYTOPENIA: ICD-10-CM

## 2020-01-23 DIAGNOSIS — G89.29 OTHER CHRONIC PAIN: ICD-10-CM

## 2020-01-23 DIAGNOSIS — I95.89 OTHER HYPOTENSION: ICD-10-CM

## 2020-01-23 DIAGNOSIS — D53.9 NUTRITIONAL ANEMIA, UNSPECIFIED: ICD-10-CM

## 2020-01-23 DIAGNOSIS — Z86.19 PERSONAL HISTORY OF OTHER INFECTIOUS AND PARASITIC DISEASES: ICD-10-CM

## 2020-01-23 DIAGNOSIS — Z88.0 ALLERGY STATUS TO PENICILLIN: ICD-10-CM

## 2020-01-23 DIAGNOSIS — K21.9 GASTRO-ESOPHAGEAL REFLUX DISEASE WITHOUT ESOPHAGITIS: ICD-10-CM

## 2020-01-23 DIAGNOSIS — R05 COUGH: ICD-10-CM

## 2020-01-23 DIAGNOSIS — G47.30 SLEEP APNEA, UNSPECIFIED: ICD-10-CM

## 2020-01-23 DIAGNOSIS — T50.2X5A ADVERSE EFFECT OF CARBONIC-ANHYDRASE INHIBITORS, BENZOTHIADIAZIDES AND OTHER DIURETICS, INITIAL ENCOUNTER: ICD-10-CM

## 2020-01-23 DIAGNOSIS — I10 ESSENTIAL (PRIMARY) HYPERTENSION: ICD-10-CM

## 2020-01-23 DIAGNOSIS — E86.0 DEHYDRATION: ICD-10-CM

## 2020-01-28 ENCOUNTER — NON-APPOINTMENT (OUTPATIENT)
Age: 50
End: 2020-01-28

## 2020-01-28 ENCOUNTER — APPOINTMENT (OUTPATIENT)
Dept: INTERNAL MEDICINE | Facility: CLINIC | Age: 50
End: 2020-01-28
Payer: COMMERCIAL

## 2020-01-28 VITALS
HEIGHT: 75 IN | BODY MASS INDEX: 32.33 KG/M2 | SYSTOLIC BLOOD PRESSURE: 118 MMHG | HEART RATE: 84 BPM | DIASTOLIC BLOOD PRESSURE: 78 MMHG | WEIGHT: 260 LBS | TEMPERATURE: 99.1 F | OXYGEN SATURATION: 95 % | RESPIRATION RATE: 18 BRPM

## 2020-01-28 DIAGNOSIS — R91.8 OTHER NONSPECIFIC ABNORMAL FINDING OF LUNG FIELD: ICD-10-CM

## 2020-01-28 DIAGNOSIS — G47.33 OBSTRUCTIVE SLEEP APNEA (ADULT) (PEDIATRIC): ICD-10-CM

## 2020-01-28 PROCEDURE — 99495 TRANSJ CARE MGMT MOD F2F 14D: CPT | Mod: 25

## 2020-01-28 PROCEDURE — 94060 EVALUATION OF WHEEZING: CPT

## 2020-01-28 RX ORDER — FLUTICASONE PROPIONATE 50 UG/1
50 SPRAY, METERED NASAL TWICE DAILY
Qty: 1 | Refills: 11 | Status: DISCONTINUED | COMMUNITY
Start: 2019-07-09 | End: 2020-01-28

## 2020-01-28 RX ORDER — TADALAFIL 10 MG/1
10 TABLET, FILM COATED ORAL
Qty: 6 | Refills: 11 | Status: DISCONTINUED | COMMUNITY
Start: 2019-07-09 | End: 2020-01-28

## 2020-01-28 NOTE — PHYSICAL EXAM
[No Acute Distress] : no acute distress [Well Nourished] : well nourished [Well Developed] : well developed [Normal Sclera/Conjunctiva] : normal sclera/conjunctiva [PERRL] : pupils equal round and reactive to light [Normal Outer Ear/Nose] : the outer ears and nose were normal in appearance [Normal Oropharynx] : the oropharynx was normal [No JVD] : no jugular venous distention [No Lymphadenopathy] : no lymphadenopathy [Supple] : supple [No Respiratory Distress] : no respiratory distress  [No Accessory Muscle Use] : no accessory muscle use [Normal Rate] : normal rate  [Regular Rhythm] : with a regular rhythm [Normal S1, S2] : normal S1 and S2 [No Edema] : there was no peripheral edema [No Extremity Clubbing/Cyanosis] : no extremity clubbing/cyanosis [Soft] : abdomen soft [Non Tender] : non-tender [Non-distended] : non-distended [No HSM] : no HSM [Normal Bowel Sounds] : normal bowel sounds [Normal Anterior Cervical Nodes] : no anterior cervical lymphadenopathy [No Rash] : no rash [No Focal Deficits] : no focal deficits [Normal Affect] : the affect was normal [Normal Insight/Judgement] : insight and judgment were intact [de-identified] : mild rhonchi, few expier wheezes with forced expir.

## 2020-01-28 NOTE — HISTORY OF PRESENT ILLNESS
[Post-hospitalization from ___ Hospital] : Post-hospitalization from [unfilled] Hospital [Admitted on: ___] : The patient was admitted on [unfilled] [Discharged on ___] : discharged on [unfilled] [Patient Contacted By: ____] : and contacted by [unfilled] [FreeTextEntry2] : This is a hospital followup visit for this pleasant 59-year-old male with a history of hypertension,, RA, sleep apnea. He was recently discharged from [facility] on January 18 after presenting with shortness of breath and low blood pressure. He was given IV fluid and his blood pressure improved. He was diagnosed as having influenza type A and was treated with Tamiflu. He was discharged on a prednisone taper which he has completed. Continues to have tiredness as well as cough and some sputum production and occasionally note some wheezing. He is not having fever. He is needing to use his rescue Ventolin inhaler 2-3 times per day recently.

## 2020-01-28 NOTE — ASSESSMENT
[FreeTextEntry1] : Spirometry today is within normal limits with an FEV1 of 3.73 or 82% predicted.\par \par #1 recent hospitalization for shortness of breath and low blood pressure, diagnosis of influenza  A. Patient has finished Tamiflu. Will give him a Medrol Dosepak for mild wheezing and airway inflammation. Also, doxycycline x5 days for acute bronchitic component. Continue Ventolin inhaler as needed for rescue. Continued fluids, Tylenol p.r.n.\par \par #2  4 mm groundglass seen in left upper lobe peripherally on recent CT scan of chest in the hospital. Aman will have a falling CT scan chest without contrast in 6 months to ensure stability of the finding. \par \par #3 HTN.\par \par #4 BRODY.\par \par Clinical following appointment in 6 months. Call or return anytime if current symptoms are not improving/resolving.

## 2020-02-12 ENCOUNTER — APPOINTMENT (OUTPATIENT)
Dept: INTERNAL MEDICINE | Facility: CLINIC | Age: 50
End: 2020-02-12

## 2020-03-03 ENCOUNTER — APPOINTMENT (OUTPATIENT)
Dept: UROLOGY | Facility: CLINIC | Age: 50
End: 2020-03-03

## 2020-04-29 ENCOUNTER — APPOINTMENT (OUTPATIENT)
Dept: DISASTER EMERGENCY | Facility: CLINIC | Age: 50
End: 2020-04-29
Payer: COMMERCIAL

## 2020-04-29 VITALS
TEMPERATURE: 98.5 F | HEART RATE: 70 BPM | SYSTOLIC BLOOD PRESSURE: 142 MMHG | DIASTOLIC BLOOD PRESSURE: 84 MMHG | RESPIRATION RATE: 16 BRPM | OXYGEN SATURATION: 95 %

## 2020-04-29 DIAGNOSIS — Z00.00 ENCOUNTER FOR GENERAL ADULT MEDICAL EXAMINATION W/OUT ABNORMAL FINDINGS: ICD-10-CM

## 2020-04-29 DIAGNOSIS — Z87.09 PERSONAL HISTORY OF OTHER DISEASES OF THE RESPIRATORY SYSTEM: ICD-10-CM

## 2020-04-29 DIAGNOSIS — R68.89 OTHER GENERAL SYMPTOMS AND SIGNS: ICD-10-CM

## 2020-04-29 DIAGNOSIS — R06.89 DYSPNEA, UNSPECIFIED: ICD-10-CM

## 2020-04-29 DIAGNOSIS — Z87.898 PERSONAL HISTORY OF OTHER SPECIFIED CONDITIONS: ICD-10-CM

## 2020-04-29 DIAGNOSIS — R05 COUGH: ICD-10-CM

## 2020-04-29 DIAGNOSIS — Z23 ENCOUNTER FOR IMMUNIZATION: ICD-10-CM

## 2020-04-29 DIAGNOSIS — J10.1 INFLUENZA DUE TO OTHER IDENTIFIED INFLUENZA VIRUS WITH OTHER RESPIRATORY MANIFESTATIONS: ICD-10-CM

## 2020-04-29 DIAGNOSIS — R06.00 DYSPNEA, UNSPECIFIED: ICD-10-CM

## 2020-04-29 DIAGNOSIS — Z03.818 ENCOUNTER FOR OBSERVATION FOR SUSPECTED EXPOSURE TO OTHER BIOLOGICAL AGENTS RULED OUT: ICD-10-CM

## 2020-04-29 DIAGNOSIS — Z20.828 CONTACT WITH AND (SUSPECTED) EXPOSURE TO OTHER VIRAL COMMUNICABLE DISEASES: ICD-10-CM

## 2020-04-29 PROCEDURE — 99213 OFFICE O/P EST LOW 20 MIN: CPT

## 2020-04-29 RX ORDER — METHYLPREDNISOLONE 4 MG/1
4 TABLET ORAL
Qty: 1 | Refills: 0 | Status: DISCONTINUED | COMMUNITY
Start: 2020-01-28 | End: 2020-04-29

## 2020-04-29 RX ORDER — DOXYCYCLINE 100 MG/1
100 CAPSULE ORAL
Qty: 10 | Refills: 0 | Status: DISCONTINUED | COMMUNITY
Start: 2020-01-28 | End: 2020-04-29

## 2020-04-29 RX ORDER — OXYCODONE HYDROCHLORIDE 15 MG/1
15 TABLET ORAL
Qty: 120 | Refills: 0 | Status: DISCONTINUED | COMMUNITY
Start: 2017-12-19 | End: 2020-04-29

## 2020-04-30 PROBLEM — R05 COUGH: Status: ACTIVE | Noted: 2019-07-09

## 2020-04-30 LAB — SARS-COV-2 N GENE NPH QL NAA+PROBE: NOT DETECTED

## 2020-05-07 PROBLEM — Z03.818 ENCOUNTER FOR OBSERVATION FOR SUSPECTED EXPOSURE TO OTHER BIOLOGICAL AGENTS RULED OUT: Status: ACTIVE | Noted: 2020-05-07

## 2020-05-07 PROBLEM — R68.89 SUSPECTED COVID-19 VIRUS INFECTION: Status: ACTIVE | Noted: 2020-04-29

## 2020-05-07 NOTE — HISTORY OF PRESENT ILLNESS
[Patient presents to the office today for COVID-19 evaluation and testing.] : Patient presents to the office today for COVID-19 evaluation and testing. [Patient has been pre-screened by RN at call center for appointment today with our facility.] : Patient has been pre-screened by RN at call center for appointment today with our facility. [] : mild dizziness on standing [None Known] : none known [Lung Disease] : lung disease [Clear] : clear [None] : none [Speaks in full sentences] : speaks in full sentences [Good Air Entry] : good air entry [Normal O2 sat at rest] : normal O2 sat at rest [Grossly normal, interacts, not tired or weak] : grossly normal, interacts, not tired or weak [COVID-19 testing ordered and specimen obtained] : COVID-19 testing ordered and specimen obtained [Discharged with current Quarantine instructions and advised of signs of worsening illness.] : Patient discharged with current quarantine instructions and advised of signs of worsening illness. Patient told to seek emergent care if symptoms occur. [FreeTextEntry1] : Feeling Sick since Sunday.   Feels like I have he flu.    [TextBox_48] : DIarrhea X 2 DAYS  [TextBox_66] : BRODY AND HTN, pre DM

## 2020-05-07 NOTE — ADDENDUM
[FreeTextEntry1] : Patient  education  - COVID-19   Counseling and education provided to the patient.  Advised sign and symptoms of the virus.  Advised contact precautions.  Educated patient on proper hand washing and to participate in social distancing. \par  \par We discussed how COVID 19  is tested.  Which is via nasopharyngeal swab.  Patient agree to testing at this time.\par   \par Advised  Mr. SIMS  if they feel SOB, GASTELUM, change in mental status, orthopnea, or dizziness upon standing to seek immediate medical attention. \par \par Patient is in full awareness of the plan and agrees to it.  All pt question was answered.  \par \par Mr. SIMS  advised to self quarantine for 14 days. Discussed in full detail what that means. Note was provided to the pt.\par \par  ARTEM  was advised to contact anybody that has been in close contact with since he started feeling sick.  They should be told that you are highly suspicious for being COVID 19 positive and they should monitor for signs and symptoms of COVID.  If the start to have symptoms to call there medical provider for evaluation.  \par \par Hand out was given to the patient to refer to for more information about COVID 19.

## 2020-05-14 ENCOUNTER — APPOINTMENT (OUTPATIENT)
Dept: INTERNAL MEDICINE | Facility: CLINIC | Age: 50
End: 2020-05-14
Payer: COMMERCIAL

## 2020-05-14 VITALS
RESPIRATION RATE: 18 BRPM | HEIGHT: 75 IN | OXYGEN SATURATION: 97 % | BODY MASS INDEX: 31.08 KG/M2 | WEIGHT: 250 LBS | DIASTOLIC BLOOD PRESSURE: 74 MMHG | HEART RATE: 86 BPM | TEMPERATURE: 97.9 F | SYSTOLIC BLOOD PRESSURE: 122 MMHG

## 2020-05-14 DIAGNOSIS — R73.9 HYPERGLYCEMIA, UNSPECIFIED: ICD-10-CM

## 2020-05-14 DIAGNOSIS — R53.82 CHRONIC FATIGUE, UNSPECIFIED: ICD-10-CM

## 2020-05-14 DIAGNOSIS — I10 ESSENTIAL (PRIMARY) HYPERTENSION: ICD-10-CM

## 2020-05-14 DIAGNOSIS — R10.9 UNSPECIFIED ABDOMINAL PAIN: ICD-10-CM

## 2020-05-14 DIAGNOSIS — J30.9 ALLERGIC RHINITIS, UNSPECIFIED: ICD-10-CM

## 2020-05-14 PROCEDURE — 99214 OFFICE O/P EST MOD 30 MIN: CPT

## 2020-05-20 ENCOUNTER — OUTPATIENT (OUTPATIENT)
Dept: OUTPATIENT SERVICES | Facility: HOSPITAL | Age: 50
LOS: 1 days | End: 2020-05-20
Payer: COMMERCIAL

## 2020-05-20 ENCOUNTER — RESULT REVIEW (OUTPATIENT)
Age: 50
End: 2020-05-20

## 2020-05-20 ENCOUNTER — APPOINTMENT (OUTPATIENT)
Dept: ULTRASOUND IMAGING | Facility: CLINIC | Age: 50
End: 2020-05-20
Payer: COMMERCIAL

## 2020-05-20 ENCOUNTER — APPOINTMENT (OUTPATIENT)
Dept: MAMMOGRAPHY | Facility: CLINIC | Age: 50
End: 2020-05-20
Payer: COMMERCIAL

## 2020-05-20 DIAGNOSIS — Z98.1 ARTHRODESIS STATUS: Chronic | ICD-10-CM

## 2020-05-20 DIAGNOSIS — Z00.8 ENCOUNTER FOR OTHER GENERAL EXAMINATION: ICD-10-CM

## 2020-05-20 DIAGNOSIS — Z96.651 PRESENCE OF RIGHT ARTIFICIAL KNEE JOINT: Chronic | ICD-10-CM

## 2020-05-20 PROCEDURE — G0279: CPT

## 2020-05-20 PROCEDURE — 76641 ULTRASOUND BREAST COMPLETE: CPT | Mod: 26,50

## 2020-05-20 PROCEDURE — G0279: CPT | Mod: 26

## 2020-05-20 PROCEDURE — 76641 ULTRASOUND BREAST COMPLETE: CPT

## 2020-05-20 PROCEDURE — 77066 DX MAMMO INCL CAD BI: CPT | Mod: 26

## 2020-05-20 PROCEDURE — 77066 DX MAMMO INCL CAD BI: CPT

## 2020-05-22 ENCOUNTER — APPOINTMENT (OUTPATIENT)
Dept: GASTROENTEROLOGY | Facility: CLINIC | Age: 50
End: 2020-05-22
Payer: COMMERCIAL

## 2020-05-22 DIAGNOSIS — R14.0 ABDOMINAL DISTENSION (GASEOUS): ICD-10-CM

## 2020-05-22 DIAGNOSIS — Z12.11 ENCOUNTER FOR SCREENING FOR MALIGNANT NEOPLASM OF COLON: ICD-10-CM

## 2020-05-22 PROCEDURE — 99212 OFFICE O/P EST SF 10 MIN: CPT | Mod: 95

## 2020-05-24 NOTE — HISTORY OF PRESENT ILLNESS
[de-identified] : The patient comes in today for a followup evaluation. There are several issues to discuss.\par \par The patient was hospitalized at [facility] from 1/15/20, 21/18/20 for influenza A. He was treated with Tamiflu. His workup included a CT angiogram of the chest, that revealed a small peripheral 4 mm left upper lobe groundglass opacity. There was no evidence for embolic disease. He eventually improved. He then had symptoms including chest congestion last month. He went to an urgent care center. He was treated with prednisone(he is unaware of the dose), and Zithromax. The symptoms have now resolved.\par \par The patient is having allergy symptoms at this time, including itchy and watery eyes. He treats himself with over-the-counter preparations such as Zyrtec. The results of the blood.\par \par The patient states that he is compliant with his nasal CPAP. Unfortunately, his device, which is more than 5 years old, is not working well at this time. He notes that at times he just shuts off automatically in the middle of the night. He needs a new machine.\par \par The patient has been complaining of abdominal discomfort across his whole anterior abdominal wall since undergoing a mesh placement for repair of an umbilical hernia. He states that the pain is present at all times and is actually been worsening recently. A CAT scan was performed by his surgeon, Dr. Starks, and apparently the result was unremarkable.\par \par The patient noted the onset of discomfort in his left breast region over the past year. He states that it has worsened and now he actually can palpate a mass in this location. He has not told anyone about it until the present time. Of note is the fact that he was on supplemental testosterone injections up until recently.\par \par The patient stopped drinking alcohol approximately 1-1/2 months ago. He went to a rehabilitation facility. He has not had a drink now in the past several weeks. He does feel improved. His weight has decreased 10 pounds since January. He now comes in for this assessment

## 2020-05-24 NOTE — PHYSICAL EXAM
[General Appearance - Alert] : alert [General Appearance - In No Acute Distress] : in no acute distress [General Appearance - Well-Appearing] : healthy appearing [General Appearance - Well Developed] : well developed [Outer Ear] : the ears and nose were normal in appearance [Hearing Threshold Finger Rub Not Wood] : hearing was normal [Examination Of The Oral Cavity] : the lips and gums were normal [Nasal Cavity] : the nasal mucosa and septum were normal [Neck Appearance] : the appearance of the neck was normal [Neck Cervical Mass (___cm)] : no neck mass was observed [Thyroid Diffuse Enlargement] : the thyroid was not enlarged [Jugular Venous Distention Increased] : there was no jugular-venous distention [Auscultation Breath Sounds / Voice Sounds] : lungs were clear to auscultation bilaterally [Respiration, Rhythm And Depth] : normal respiratory rhythm and effort [Exaggerated Use Of Accessory Muscles For Inspiration] : no accessory muscle use [Heart Sounds Gallop] : no gallops [Heart Rate And Rhythm] : heart rate was normal and rhythm regular [Heart Sounds] : normal S1 and S2 [Veins - Varicosity Changes] : there were no varicosital changes [Systolic grade ___/6] : A grade [unfilled]/6 systolic murmur was heard. [Edema] : there was no peripheral edema [Abdomen Soft] : soft [Abdomen Mass (___ Cm)] : no abdominal mass palpated [Supraclavicular Lymph Nodes Enlarged Bilaterally] : supraclavicular [Cervical Lymph Nodes Enlarged Anterior Bilaterally] : anterior cervical [Antalgic Gait Bilateral] : antalgic bilaterally [Nail Clubbing] : no clubbing  or cyanosis of the fingernails [Musculoskeletal - Swelling] : no joint swelling seen [Skin Color & Pigmentation] : normal skin color and pigmentation [Skin Turgor] : normal skin turgor [] : no rash [de-identified] : there appears to be a nodular mass present medial to the area or in the left breast which measures approximately 2 x 2 centimeters meters. [FreeTextEntry1] : There is pain with internal and extra rotation of the right shoulder

## 2020-05-24 NOTE — PLAN
[FreeTextEntry1] : 1. Continued medication as outlined above.\par \par 2. The patient will now be set up for a split night polysomnogram, as he is in need of a new CPAP device\par \par 3. The patient has been referred to Dr. Regan for a baseline screening colonoscopy\par \par 4. Mammography and sonography of the left breast has been recommended\par \par 5. The patient will followup with his surgeon, Dr. Starks. The purpose of that consultation will be to reevaluate the abdominal pain which he claims is due to the mesh that was placed when his umbilical hernia was repaired. He will also need to be evaluated for the possible left breast mass.\par \par 6. The patient will contact his insurance company regarding coverage of the new shingles vaccine\par \par 7. The patient will consider resuming testosterone injections with his urologist.\par \par 8. Followup with myself in 6 months with a wellness evaluation. He will undergo a routine fasting blood work prior to that visit. We will need to schedule the patient for a repeat CAT scan of the chest at that time, to reevaluate the left upper lung field ground glass opacity that was seen on the CAT scan performed in January of 2020.

## 2020-05-28 PROBLEM — Z12.11 COLON CANCER SCREENING: Status: ACTIVE | Noted: 2020-05-22

## 2020-06-08 ENCOUNTER — APPOINTMENT (OUTPATIENT)
Dept: SURGICAL ONCOLOGY | Facility: CLINIC | Age: 50
End: 2020-06-08
Payer: COMMERCIAL

## 2020-06-08 PROCEDURE — 99203 OFFICE O/P NEW LOW 30 MIN: CPT | Mod: 95

## 2020-06-08 NOTE — HISTORY OF PRESENT ILLNESS
[de-identified] : 49 year-old male presents for an initial consultation via [employer].  He was referred for breast imaging in May 2020 to evaluate complaints of left breast pain/tenderness with associated palpable mass, which has been enlarging over time.  Imaging was consistent with left>right retroareolar gynecomastia but no suspicious findings (BIRADS 2).  He admits to receiving testosterone injections for testosterone insufficiency which he stopped a few months ago.\par \par His past medical history includes hypertension, hyperlipidemia, BRODY, NAFLD, pre-DM, anemia and cervical disc disease.  He has a family history of breast cancer involving his mother (diagnosed at age 72).\par \par He now has tender mass below his left nipple. None on the right. No discharge from either nipple. \par He has been off testosterone and is aware that his level seems low based on other signs and symptoms\par \par

## 2020-06-08 NOTE — ASSESSMENT
[FreeTextEntry1] : IMP:\par Left>Right gynecomastia with h/o testosterone injections\par Benign breast imaging\par no clinical exam done\par \par PLAN:\par office visit for physical exam.\par consultation re: testosterone therapy.

## 2020-06-22 ENCOUNTER — APPOINTMENT (OUTPATIENT)
Dept: SURGICAL ONCOLOGY | Facility: CLINIC | Age: 50
End: 2020-06-22
Payer: COMMERCIAL

## 2020-06-22 VITALS
RESPIRATION RATE: 17 BRPM | SYSTOLIC BLOOD PRESSURE: 122 MMHG | HEART RATE: 85 BPM | WEIGHT: 250 LBS | OXYGEN SATURATION: 95 % | HEIGHT: 75 IN | BODY MASS INDEX: 31.08 KG/M2 | DIASTOLIC BLOOD PRESSURE: 78 MMHG

## 2020-06-22 VITALS — TEMPERATURE: 98.4 F

## 2020-06-22 DIAGNOSIS — N63.0 UNSPECIFIED LUMP IN UNSPECIFIED BREAST: ICD-10-CM

## 2020-06-22 PROCEDURE — 99213 OFFICE O/P EST LOW 20 MIN: CPT

## 2020-06-22 NOTE — HISTORY OF PRESENT ILLNESS
[de-identified] : 49 year-old male returns for follow up.  He was initially seen in consultation on 6/8/20 via [facility].  He was referred for breast imaging in May 2020 to evaluate complaints of left breast pain/tenderness with associated palpable mass, which has been enlarging over time.  Imaging was consistent with left>right retroareolar gynecomastia but no suspicious findings (BIRADS 2).  He admits to receiving testosterone injections for testosterone insufficiency which he stopped a few months ago.\par \par His past medical history includes hypertension, hyperlipidemia, BRODY, NAFLD, pre-DM, anemia and cervical disc disease.  He has a family history of breast cancer involving his mother (diagnosed at age 72).\par \par During the time of his consultation, he described a tender mass below his left nipple. None on the right. No discharge from either nipple. He had been off testosterone and is aware that his level seems low based on other signs and symptoms\par \par He is here today for a physical exam.

## 2020-06-22 NOTE — PHYSICAL EXAM
[Normal] : supple, no neck mass and thyroid not enlarged [Normal Supraclavicular Lymph Nodes] : normal supraclavicular lymph nodes [Normal Axillary Lymph Nodes] : normal axillary lymph nodes [Normal] : grossly intact [de-identified] : Normal S1, S2.  Regular rate and rhythm.  [de-identified] : RT. mild soft gynecomastia Left 6cm.diameter tender male breast. No nipple retraction or discharge. [de-identified] : Clear breath sounds bilaterally, normal respiratory effort.

## 2020-06-22 NOTE — ASSESSMENT
[FreeTextEntry1] : IMP:\par Left>Right gynecomastia with h/o testosterone injections\par Benign breast imaging\par \par \par PLAN:\par consultation re: testosterone therapy.

## 2020-06-25 ENCOUNTER — APPOINTMENT (OUTPATIENT)
Dept: UROLOGY | Facility: CLINIC | Age: 50
End: 2020-06-25
Payer: COMMERCIAL

## 2020-06-25 VITALS
SYSTOLIC BLOOD PRESSURE: 135 MMHG | TEMPERATURE: 98.7 F | DIASTOLIC BLOOD PRESSURE: 80 MMHG | BODY MASS INDEX: 31.08 KG/M2 | HEART RATE: 83 BPM | HEIGHT: 75 IN | WEIGHT: 250 LBS | OXYGEN SATURATION: 95 %

## 2020-06-25 PROCEDURE — 99213 OFFICE O/P EST LOW 20 MIN: CPT

## 2020-07-01 LAB
ALBUMIN SERPL ELPH-MCNC: 4.4 G/DL
ALP BLD-CCNC: 86 U/L
ALT SERPL-CCNC: 29 U/L
AST SERPL-CCNC: 39 U/L
BASOPHILS # BLD AUTO: 0.03 K/UL
BASOPHILS NFR BLD AUTO: 0.6 %
BILIRUB DIRECT SERPL-MCNC: 0.3 MG/DL
BILIRUB INDIRECT SERPL-MCNC: 0.5 MG/DL
BILIRUB SERPL-MCNC: 0.8 MG/DL
CHOLEST SERPL-MCNC: 189 MG/DL
CHOLEST/HDLC SERPL: 7.5 RATIO
EOSINOPHIL # BLD AUTO: 0.07 K/UL
EOSINOPHIL NFR BLD AUTO: 1.3 %
ESTRADIOL SERPL-MCNC: 32 PG/ML
FSH SERPL-MCNC: 15.2 IU/L
HCT VFR BLD CALC: 41.1 %
HDLC SERPL-MCNC: 25 MG/DL
HGB BLD-MCNC: 13.4 G/DL
IMM GRANULOCYTES NFR BLD AUTO: 0.6 %
LDLC SERPL CALC-MCNC: 113 MG/DL
LH SERPL-ACNC: 9.3 IU/L
LYMPHOCYTES # BLD AUTO: 1.67 K/UL
LYMPHOCYTES NFR BLD AUTO: 31.7 %
MAN DIFF?: NORMAL
MCHC RBC-ENTMCNC: 32.6 GM/DL
MCHC RBC-ENTMCNC: 33.1 PG
MCV RBC AUTO: 101.5 FL
MONOCYTES # BLD AUTO: 0.51 K/UL
MONOCYTES NFR BLD AUTO: 9.7 %
NEUTROPHILS # BLD AUTO: 2.95 K/UL
NEUTROPHILS NFR BLD AUTO: 56.1 %
PLATELET # BLD AUTO: 71 K/UL
PROLACTIN SERPL-MCNC: 13.1 NG/ML
PROT SERPL-MCNC: 7.1 G/DL
PSA SERPL-MCNC: 0.14 NG/ML
RBC # BLD: 4.05 M/UL
RBC # FLD: 14.6 %
TRIGL SERPL-MCNC: 255 MG/DL
WBC # FLD AUTO: 5.26 K/UL

## 2020-07-06 LAB
ESTROGEN SERPL-MCNC: 130 PG/ML
TESTOST BND SERPL-MCNC: 4.2 PG/ML
TESTOST SERPL-MCNC: 112 NG/DL

## 2020-07-08 ENCOUNTER — TRANSCRIPTION ENCOUNTER (OUTPATIENT)
Age: 50
End: 2020-07-08

## 2020-07-17 ENCOUNTER — LABORATORY RESULT (OUTPATIENT)
Age: 50
End: 2020-07-17

## 2020-07-20 ENCOUNTER — RESULT REVIEW (OUTPATIENT)
Age: 50
End: 2020-07-20

## 2020-07-20 ENCOUNTER — APPOINTMENT (OUTPATIENT)
Dept: GASTROENTEROLOGY | Facility: AMBULATORY MEDICAL SERVICES | Age: 50
End: 2020-07-20
Payer: COMMERCIAL

## 2020-07-20 PROCEDURE — 45385 COLONOSCOPY W/LESION REMOVAL: CPT

## 2020-07-22 RX ORDER — ESCITALOPRAM OXALATE 20 MG/1
20 TABLET ORAL
Qty: 30 | Refills: 0 | Status: ACTIVE | COMMUNITY
Start: 2020-02-25

## 2020-07-22 NOTE — PHYSICAL EXAM
[General Appearance - Well Developed] : well developed [Well Groomed] : well groomed [Normal Appearance] : normal appearance [General Appearance - Well Nourished] : well nourished [General Appearance - In No Acute Distress] : no acute distress [Abdomen Tenderness] : non-tender [Abdomen Soft] : soft [Urethral Meatus] : meatus normal [Costovertebral Angle Tenderness] : no ~M costovertebral angle tenderness [Urinary Bladder Findings] : the bladder was normal on palpation [Scrotum] : the scrotum was normal [Testes Mass (___cm)] : there were no testicular masses [Edema] : no peripheral edema [Oriented To Time, Place, And Person] : oriented to person, place, and time [Exaggerated Use Of Accessory Muscles For Inspiration] : no accessory muscle use [Respiration, Rhythm And Depth] : normal respiratory rhythm and effort [] : no respiratory distress [Affect] : the affect was normal [Mood] : the mood was normal [Not Anxious] : not anxious [Normal Station and Gait] : the gait and station were normal for the patient's age [No Focal Deficits] : no focal deficits [No Palpable Adenopathy] : no palpable adenopathy

## 2020-07-22 NOTE — ASSESSMENT
[FreeTextEntry1] : 48 yo M with Low testosterone, symptomatic hypogonadism. Now with recurrence of hypogonadal symptoms and development of gynecomastia off TRT. Recommend resumption. 200 mg testosterone cypionate administered. WIll Rx as well. surveillance labs in 6 months.

## 2020-07-22 NOTE — HISTORY OF PRESENT ILLNESS
[FreeTextEntry1] : 48 yo M with Low Testosterone seen 3/2018 with labs (2/2018) Total 281, free 3.4. He reports depression, low libido and fatigue. It is associated with erectile dysfunction. Of note, pt was found to have elevated LFTs. Dr Damon gave clearance for TRT. He also reports mild LUTS with hesitancy. He was started on TRT and found 300 mg IM q2 weeks to control his symptoms. \par \par 1/3/19: Patient presents for follow up. He has been administering TRT injections without complaint. Good control of his hypogonadism symptoms. LUTS not worse or bothersome. No hematuria, no dysuria, no frequency, no urgency. No incontinence. No fevers, no chills, no nausea, no vomiting, no flank pain. \par He had been scheduled for labs prior to this visit, but he has not yet obtained them. \par \par 06/25/2020: Patient presents for follow up. He has not been seen since 1/2019. In that time he stopped administering TRT and developed gynecomastia. He reports fatigue, low libido. No new  symptoms and other medical  issues remain unchanged from above. No hematuria, no dysuria, no frequency, no urgency, no hesitancy, no straining. No incontinence. No fevers, no chills, no nausea, no vomiting, no flank pain.

## 2020-08-11 ENCOUNTER — APPOINTMENT (OUTPATIENT)
Dept: GASTROENTEROLOGY | Facility: CLINIC | Age: 50
End: 2020-08-11
Payer: COMMERCIAL

## 2020-08-11 PROCEDURE — 99441: CPT

## 2020-08-11 NOTE — REASON FOR VISIT
[Home] : at home, [unfilled] , at the time of the visit. [[facility]] : at the medical office located in  [Follow-Up: _____] : a [unfilled] follow-up visit

## 2020-08-14 NOTE — ASSESSMENT
[FreeTextEntry1] : 48 yo male with c/o diarrhea that lead to anal irritation since his colonoscopy.  Reports persistent diarrhea with some improvement since his colonoscopy.  Pt has colonoscopy with Dr. Regan July 22nd and was noted to have a colon polyp. No fevers, n/v, abdominal pain. \par \par Plan: \par Start questran as needed for diarrhea for few days. \par If becomes worse, consider sending stool studies. \par Colonoscopy reviewed with pt. \par F/u 1-2 weeks. \par \par Discussed w. Dr. Irvin.

## 2020-08-14 NOTE — HISTORY OF PRESENT ILLNESS
[de-identified] : 50 yo male with c/o diarrhea that lead to anal irritation since his colonoscopy.  Reports persistent diarrhea with some improvement since his colonoscopy.  Pt has colonoscopy with Dr. Regan July 22nd and was noted to have a colon polyp. No fevers, n/v, abdominal pain.

## 2020-08-17 ENCOUNTER — TRANSCRIPTION ENCOUNTER (OUTPATIENT)
Age: 50
End: 2020-08-17

## 2020-08-22 ENCOUNTER — EMERGENCY (EMERGENCY)
Facility: HOSPITAL | Age: 50
LOS: 0 days | Discharge: HOME | End: 2020-08-23
Attending: EMERGENCY MEDICINE
Payer: COMMERCIAL

## 2020-08-22 VITALS
DIASTOLIC BLOOD PRESSURE: 63 MMHG | OXYGEN SATURATION: 100 % | RESPIRATION RATE: 18 BRPM | SYSTOLIC BLOOD PRESSURE: 128 MMHG | TEMPERATURE: 99 F | HEIGHT: 72 IN | HEART RATE: 96 BPM | WEIGHT: 220.02 LBS

## 2020-08-22 DIAGNOSIS — I10 ESSENTIAL (PRIMARY) HYPERTENSION: ICD-10-CM

## 2020-08-22 DIAGNOSIS — M54.9 DORSALGIA, UNSPECIFIED: ICD-10-CM

## 2020-08-22 DIAGNOSIS — Z96.651 PRESENCE OF RIGHT ARTIFICIAL KNEE JOINT: ICD-10-CM

## 2020-08-22 DIAGNOSIS — G47.30 SLEEP APNEA, UNSPECIFIED: ICD-10-CM

## 2020-08-22 DIAGNOSIS — Z96.651 PRESENCE OF RIGHT ARTIFICIAL KNEE JOINT: Chronic | ICD-10-CM

## 2020-08-22 DIAGNOSIS — K21.9 GASTRO-ESOPHAGEAL REFLUX DISEASE WITHOUT ESOPHAGITIS: ICD-10-CM

## 2020-08-22 DIAGNOSIS — Z88.0 ALLERGY STATUS TO PENICILLIN: ICD-10-CM

## 2020-08-22 DIAGNOSIS — M06.9 RHEUMATOID ARTHRITIS, UNSPECIFIED: ICD-10-CM

## 2020-08-22 DIAGNOSIS — R10.32 LEFT LOWER QUADRANT PAIN: ICD-10-CM

## 2020-08-22 DIAGNOSIS — G89.29 OTHER CHRONIC PAIN: ICD-10-CM

## 2020-08-22 DIAGNOSIS — R10.33 PERIUMBILICAL PAIN: ICD-10-CM

## 2020-08-22 DIAGNOSIS — Z98.1 ARTHRODESIS STATUS: Chronic | ICD-10-CM

## 2020-08-22 LAB
ALBUMIN SERPL ELPH-MCNC: 3.6 G/DL — SIGNIFICANT CHANGE UP (ref 3.3–5)
ALP SERPL-CCNC: 95 U/L — SIGNIFICANT CHANGE UP (ref 40–120)
ALT FLD-CCNC: 50 U/L — SIGNIFICANT CHANGE UP (ref 12–78)
ANION GAP SERPL CALC-SCNC: 10 MMOL/L — SIGNIFICANT CHANGE UP (ref 5–17)
APTT BLD: 38.5 SEC — HIGH (ref 27.5–35.5)
AST SERPL-CCNC: 72 U/L — HIGH (ref 15–37)
BASOPHILS # BLD AUTO: 0.06 K/UL — SIGNIFICANT CHANGE UP (ref 0–0.2)
BASOPHILS NFR BLD AUTO: 0.6 % — SIGNIFICANT CHANGE UP (ref 0–2)
BILIRUB SERPL-MCNC: 1.8 MG/DL — HIGH (ref 0.2–1.2)
BUN SERPL-MCNC: 12 MG/DL — SIGNIFICANT CHANGE UP (ref 7–23)
CALCIUM SERPL-MCNC: 8.8 MG/DL — SIGNIFICANT CHANGE UP (ref 8.5–10.1)
CHLORIDE SERPL-SCNC: 102 MMOL/L — SIGNIFICANT CHANGE UP (ref 96–108)
CO2 SERPL-SCNC: 25 MMOL/L — SIGNIFICANT CHANGE UP (ref 22–31)
CREAT SERPL-MCNC: 1.33 MG/DL — HIGH (ref 0.5–1.3)
EOSINOPHIL # BLD AUTO: 0.08 K/UL — SIGNIFICANT CHANGE UP (ref 0–0.5)
EOSINOPHIL NFR BLD AUTO: 0.8 % — SIGNIFICANT CHANGE UP (ref 0–6)
GLUCOSE SERPL-MCNC: 128 MG/DL — HIGH (ref 70–99)
HCT VFR BLD CALC: 44.3 % — SIGNIFICANT CHANGE UP (ref 39–50)
HGB BLD-MCNC: 15.2 G/DL — SIGNIFICANT CHANGE UP (ref 13–17)
IMM GRANULOCYTES NFR BLD AUTO: 0.5 % — SIGNIFICANT CHANGE UP (ref 0–1.5)
INR BLD: 1.33 RATIO — HIGH (ref 0.88–1.16)
LIDOCAIN IGE QN: 167 U/L — SIGNIFICANT CHANGE UP (ref 73–393)
LYMPHOCYTES # BLD AUTO: 1.93 K/UL — SIGNIFICANT CHANGE UP (ref 1–3.3)
LYMPHOCYTES # BLD AUTO: 18.6 % — SIGNIFICANT CHANGE UP (ref 13–44)
MCHC RBC-ENTMCNC: 33.6 PG — SIGNIFICANT CHANGE UP (ref 27–34)
MCHC RBC-ENTMCNC: 34.3 GM/DL — SIGNIFICANT CHANGE UP (ref 32–36)
MCV RBC AUTO: 97.8 FL — SIGNIFICANT CHANGE UP (ref 80–100)
MONOCYTES # BLD AUTO: 0.76 K/UL — SIGNIFICANT CHANGE UP (ref 0–0.9)
MONOCYTES NFR BLD AUTO: 7.3 % — SIGNIFICANT CHANGE UP (ref 2–14)
NEUTROPHILS # BLD AUTO: 7.49 K/UL — HIGH (ref 1.8–7.4)
NEUTROPHILS NFR BLD AUTO: 72.2 % — SIGNIFICANT CHANGE UP (ref 43–77)
PLATELET # BLD AUTO: 100 K/UL — LOW (ref 150–400)
POTASSIUM SERPL-MCNC: 4 MMOL/L — SIGNIFICANT CHANGE UP (ref 3.5–5.3)
POTASSIUM SERPL-SCNC: 4 MMOL/L — SIGNIFICANT CHANGE UP (ref 3.5–5.3)
PROT SERPL-MCNC: 8.3 GM/DL — SIGNIFICANT CHANGE UP (ref 6–8.3)
PROTHROM AB SERPL-ACNC: 15.3 SEC — HIGH (ref 10.6–13.6)
RBC # BLD: 4.53 M/UL — SIGNIFICANT CHANGE UP (ref 4.2–5.8)
RBC # FLD: 13.3 % — SIGNIFICANT CHANGE UP (ref 10.3–14.5)
SODIUM SERPL-SCNC: 137 MMOL/L — SIGNIFICANT CHANGE UP (ref 135–145)
WBC # BLD: 10.37 K/UL — SIGNIFICANT CHANGE UP (ref 3.8–10.5)
WBC # FLD AUTO: 10.37 K/UL — SIGNIFICANT CHANGE UP (ref 3.8–10.5)

## 2020-08-22 PROCEDURE — 36415 COLL VENOUS BLD VENIPUNCTURE: CPT

## 2020-08-22 PROCEDURE — 96374 THER/PROPH/DIAG INJ IV PUSH: CPT | Mod: XU

## 2020-08-22 PROCEDURE — 85025 COMPLETE CBC W/AUTO DIFF WBC: CPT

## 2020-08-22 PROCEDURE — 85610 PROTHROMBIN TIME: CPT

## 2020-08-22 PROCEDURE — 74177 CT ABD & PELVIS W/CONTRAST: CPT | Mod: 26

## 2020-08-22 PROCEDURE — 87086 URINE CULTURE/COLONY COUNT: CPT

## 2020-08-22 PROCEDURE — 71045 X-RAY EXAM CHEST 1 VIEW: CPT | Mod: 26

## 2020-08-22 PROCEDURE — 99284 EMERGENCY DEPT VISIT MOD MDM: CPT | Mod: 25

## 2020-08-22 PROCEDURE — 85730 THROMBOPLASTIN TIME PARTIAL: CPT

## 2020-08-22 PROCEDURE — 80053 COMPREHEN METABOLIC PANEL: CPT

## 2020-08-22 PROCEDURE — 93005 ELECTROCARDIOGRAM TRACING: CPT

## 2020-08-22 PROCEDURE — 93010 ELECTROCARDIOGRAM REPORT: CPT

## 2020-08-22 PROCEDURE — 81001 URINALYSIS AUTO W/SCOPE: CPT

## 2020-08-22 PROCEDURE — 74177 CT ABD & PELVIS W/CONTRAST: CPT

## 2020-08-22 PROCEDURE — 99285 EMERGENCY DEPT VISIT HI MDM: CPT

## 2020-08-22 PROCEDURE — 71045 X-RAY EXAM CHEST 1 VIEW: CPT

## 2020-08-22 PROCEDURE — 83690 ASSAY OF LIPASE: CPT

## 2020-08-22 RX ORDER — SODIUM CHLORIDE 9 MG/ML
1000 INJECTION INTRAMUSCULAR; INTRAVENOUS; SUBCUTANEOUS ONCE
Refills: 0 | Status: COMPLETED | OUTPATIENT
Start: 2020-08-22 | End: 2020-08-22

## 2020-08-22 RX ORDER — MORPHINE SULFATE 50 MG/1
4 CAPSULE, EXTENDED RELEASE ORAL ONCE
Refills: 0 | Status: DISCONTINUED | OUTPATIENT
Start: 2020-08-22 | End: 2020-08-22

## 2020-08-22 RX ADMIN — SODIUM CHLORIDE 1000 MILLILITER(S): 9 INJECTION INTRAMUSCULAR; INTRAVENOUS; SUBCUTANEOUS at 22:36

## 2020-08-22 RX ADMIN — MORPHINE SULFATE 4 MILLIGRAM(S): 50 CAPSULE, EXTENDED RELEASE ORAL at 22:35

## 2020-08-22 NOTE — ED PROVIDER NOTE - PATIENT PORTAL LINK FT
You can access the FollowMyHealth Patient Portal offered by [facility] by registering at the following website: [email or web address]. By joining [employer]’s FollowMyHealth portal, you will also be able to view your health information using other applications (apps) compatible with our system.

## 2020-08-22 NOTE — ED PROVIDER NOTE - PMH
Occupational Therapy Screening:  Services are indicated. Evaluate and Treat Order is requested. An [facility] screening referral was triggered for occupational therapy based on results obtained during the nursing admission assessment. The patients chart was reviewed and the patient is appropriate for a skilled therapy evaluation. Patient is DESHAUN/Anterior approach. If unable to be seen by OT in hospital setting, please have [address] [place] [address]'[place] see patient for LE ADL strategies. Please order a consult for occupational therapy if you are in agreement and would like an evaluation to be completed. Thank you.   Cassidy Ramos, OTR/L Chronic back pain    GERD (gastroesophageal reflux disease)    HTN (hypertension)    Lyme disease    RA (rheumatoid arthritis)    Sleep apnea, unspecified type    Umbilical hernia

## 2020-08-22 NOTE — ED PROVIDER NOTE - CARE PROVIDER_API CALL
David Starks  SURGERY  [address] [place] [address]  Phone: [phone]  Fax: [phone]  Follow Up Time:

## 2020-08-22 NOTE — ED ADULT NURSE NOTE - OBJECTIVE STATEMENT
Pt presents to ED c/o abdominal pain, worse in LLQ. Pt denies chest pain, no SOB. Pt denies NVD. Patient has chronic back pain, but pt states the back pain has not gotten worse since abdominal pain started. Pt presents to ED c/o abdominal pain, worse in LLQ. Pt denies chest pain, no SOB. Pt denies NVD. Patient has chronic back pain, but pt states the back pain has not gotten worse since abdominal pain started. Pt reports the pain started about 3 hours. Pt had mesh placed in stomach and reports having problems with abdominal pain since. Denies difficulty with urination. Pt reports being able to tolerate PO intake. Denies chills.

## 2020-08-22 NOTE — ED PROVIDER NOTE - GASTROINTESTINAL, MLM
Abdomen soft, non-tender, no guarding. Abdomen soft, non-tender, no guarding. +hyperactive bowel sounds. Upper more hyperactive than the lower.

## 2020-08-22 NOTE — ED PROVIDER NOTE - PROGRESS NOTE DETAILS
JG:  Received signout from Dr. Izaguirre to follow up CT abd/pelv which was negative.  UA pending.  EKG and CXR normal. UA negative for UTI.  Patient complains of protrusion of mid abdomen and some pain associated with touching mesh area, but on exam, there is no erythema, focal tenderness or palpable hernia.  Patient advised to see Dr. Starks in office.

## 2020-08-22 NOTE — ED PROVIDER NOTE - OBJECTIVE STATEMENT
50 y/o male with pmhx of HTN, umbilical hernia, GERD, chronic back pain, sleep apnea, RA, lyme disease presents to the ED c/o LLQ abd pain. Denies N/V/D. 48 y/o male with pmhx of HTN, umbilical hernia, GERD, chronic back pain, sleep apnea, RA, lyme disease presents to the ED c/o LLQ abd pain. Pt describes pain as constant and that it started three hours ago. Pt states he has a mesh inserted into his stomach by  and has had problems with his abd since. Pt says "no one wants to believe me" Had a colonoscopy about a month ago. Denies N/V/D.

## 2020-08-23 VITALS
DIASTOLIC BLOOD PRESSURE: 66 MMHG | TEMPERATURE: 98 F | HEART RATE: 75 BPM | RESPIRATION RATE: 18 BRPM | OXYGEN SATURATION: 96 % | SYSTOLIC BLOOD PRESSURE: 132 MMHG

## 2020-08-23 LAB
APPEARANCE UR: CLEAR — SIGNIFICANT CHANGE UP
BILIRUB UR-MCNC: NEGATIVE — SIGNIFICANT CHANGE UP
COLOR SPEC: YELLOW — SIGNIFICANT CHANGE UP
DIFF PNL FLD: NEGATIVE — SIGNIFICANT CHANGE UP
GLUCOSE UR QL: NEGATIVE MG/DL — SIGNIFICANT CHANGE UP
KETONES UR-MCNC: NEGATIVE — SIGNIFICANT CHANGE UP
LEUKOCYTE ESTERASE UR-ACNC: ABNORMAL
NITRITE UR-MCNC: NEGATIVE — SIGNIFICANT CHANGE UP
PH UR: 7 — SIGNIFICANT CHANGE UP (ref 5–8)
PROT UR-MCNC: 15 MG/DL
SP GR SPEC: 1 — LOW (ref 1.01–1.02)
UROBILINOGEN FLD QL: 4 MG/DL

## 2020-08-24 LAB
CULTURE RESULTS: SIGNIFICANT CHANGE UP
SPECIMEN SOURCE: SIGNIFICANT CHANGE UP

## 2020-11-20 ENCOUNTER — APPOINTMENT (OUTPATIENT)
Dept: INTERNAL MEDICINE | Facility: CLINIC | Age: 50
End: 2020-11-20
Payer: COMMERCIAL

## 2020-11-20 ENCOUNTER — NON-APPOINTMENT (OUTPATIENT)
Age: 50
End: 2020-11-20

## 2020-11-20 VITALS
SYSTOLIC BLOOD PRESSURE: 156 MMHG | DIASTOLIC BLOOD PRESSURE: 64 MMHG | HEART RATE: 104 BPM | WEIGHT: 308 LBS | TEMPERATURE: 98.1 F | HEIGHT: 75 IN | BODY MASS INDEX: 38.3 KG/M2 | OXYGEN SATURATION: 96 %

## 2020-11-20 DIAGNOSIS — Z87.891 PERSONAL HISTORY OF NICOTINE DEPENDENCE: ICD-10-CM

## 2020-11-20 DIAGNOSIS — Z00.00 ENCOUNTER FOR GENERAL ADULT MEDICAL EXAMINATION W/OUT ABNORMAL FINDINGS: ICD-10-CM

## 2020-11-20 DIAGNOSIS — R06.00 DYSPNEA, UNSPECIFIED: ICD-10-CM

## 2020-11-20 DIAGNOSIS — F32.9 ANXIETY DISORDER, UNSPECIFIED: ICD-10-CM

## 2020-11-20 DIAGNOSIS — F41.9 ANXIETY DISORDER, UNSPECIFIED: ICD-10-CM

## 2020-11-20 DIAGNOSIS — J98.01 ACUTE BRONCHOSPASM: ICD-10-CM

## 2020-11-20 DIAGNOSIS — Z23 ENCOUNTER FOR IMMUNIZATION: ICD-10-CM

## 2020-11-20 DIAGNOSIS — R73.03 PREDIABETES.: ICD-10-CM

## 2020-11-20 DIAGNOSIS — R00.2 PALPITATIONS: ICD-10-CM

## 2020-11-20 PROCEDURE — 99396 PREV VISIT EST AGE 40-64: CPT | Mod: 25

## 2020-11-20 PROCEDURE — 93000 ELECTROCARDIOGRAM COMPLETE: CPT

## 2020-11-20 PROCEDURE — 99214 OFFICE O/P EST MOD 30 MIN: CPT | Mod: 25

## 2020-11-20 PROCEDURE — 90686 IIV4 VACC NO PRSV 0.5 ML IM: CPT

## 2020-11-20 PROCEDURE — G0008: CPT

## 2020-11-20 RX ORDER — TRAZODONE HYDROCHLORIDE 100 MG/1
100 TABLET ORAL
Qty: 30 | Refills: 0 | Status: DISCONTINUED | COMMUNITY
Start: 2019-12-31 | End: 2020-11-20

## 2020-11-20 RX ORDER — HYDROCHLOROTHIAZIDE 25 MG/1
25 TABLET ORAL
Qty: 30 | Refills: 0 | Status: DISCONTINUED | COMMUNITY
Start: 2020-02-28 | End: 2020-11-20

## 2020-11-20 RX ORDER — ALBUTEROL SULFATE 90 UG/1
108 (90 BASE) AEROSOL, METERED RESPIRATORY (INHALATION)
Qty: 18 | Refills: 0 | Status: DISCONTINUED | COMMUNITY
Start: 2020-05-05 | End: 2020-11-20

## 2020-11-20 RX ORDER — QUETIAPINE FUMARATE 50 MG/1
50 TABLET ORAL
Qty: 30 | Refills: 0 | Status: DISCONTINUED | COMMUNITY
Start: 2020-06-17 | End: 2020-11-20

## 2020-11-20 RX ORDER — SODIUM SULFATE, POTASSIUM SULFATE, MAGNESIUM SULFATE 17.5; 3.13; 1.6 G/ML; G/ML; G/ML
17.5-3.13-1.6 SOLUTION, CONCENTRATE ORAL
Qty: 1 | Refills: 0 | Status: DISCONTINUED | COMMUNITY
Start: 2020-07-13 | End: 2020-11-20

## 2020-11-20 RX ORDER — TRAZODONE HYDROCHLORIDE 150 MG/1
150 TABLET ORAL
Qty: 30 | Refills: 0 | Status: DISCONTINUED | COMMUNITY
Start: 2020-05-19 | End: 2020-11-20

## 2020-11-20 RX ORDER — CLONAZEPAM 1 MG/1
1 TABLET ORAL
Qty: 60 | Refills: 0 | Status: DISCONTINUED | COMMUNITY
Start: 2020-01-28 | End: 2020-11-20

## 2020-11-20 RX ORDER — FLUTICASONE PROPIONATE 44 UG/1
44 AEROSOL, METERED RESPIRATORY (INHALATION)
Qty: 11 | Refills: 0 | Status: DISCONTINUED | COMMUNITY
Start: 2020-05-05 | End: 2020-11-20

## 2020-11-20 NOTE — HISTORY OF PRESENT ILLNESS
[de-identified] : The patient comes in today for a wellness evaluation.\par \par Unfortunately, the patient is having multiple issues that need to be addressed at this time. He is taking a multitude of medications. His major complaint appears to be symptoms that are associated around his high level of anxiety. He is currently seeing a psychiatrist, Dr. Henry who is prescribing Klonopin and Lexapro for treatment of this disorder. He has periods of flushing. He has been binge eating and he has gained between 50-60 pounds over the past year. He states that he has been working from home.\par \par The patient complained of several episodes of body aches. He went to an urgent care center on 3 separate occasions. He felt as if there was a "raspy sensation," in his chest. He was treated initially with a Z-Aaron and a Medrol Dosepak. On another visit he was treated with Augmentin. He appeared to obtain some relief from the Augmentin. He has not been compliant with any metered dose inhalers.\par \par The patient states that he was having palpitations. He felt as if his heart has been racing. He attributed this to the anxiety. He does not exercise. He does not monitor his blood sugars. He states that he is compliant with his nasal CPAP nocturnally.\par \par Lastly, the patient was seen by Dr. Kingsley, his urologist, who is now administering testosterone replacement therapy. He now comes in for this assessment.

## 2020-11-20 NOTE — PHYSICAL EXAM
[General Appearance - Alert] : alert [General Appearance - In No Acute Distress] : in no acute distress [General Appearance - Well Developed] : well developed [General Appearance - Well-Appearing] : healthy appearing [Sclera] : the sclera and conjunctiva were normal [PERRL With Normal Accommodation] : pupils were equal in size, round, and reactive to light [Strabismus] : no strabismus was seen [Outer Ear] : the ears and nose were normal in appearance [Hearing Threshold Finger Rub Not [place]] : hearing was normal [Examination Of The Oral Cavity] : the lips and gums were normal [Nasal Cavity] : the nasal mucosa and septum were normal [Neck Appearance] : the appearance of the neck was normal [Neck Cervical Mass (___cm)] : no neck mass was observed [Jugular Venous Distention Increased] : there was no jugular-venous distention [Thyroid Diffuse Enlargement] : the thyroid was not enlarged [Respiration, Rhythm And Depth] : normal respiratory rhythm and effort [Exaggerated Use Of Accessory Muscles For Inspiration] : no accessory muscle use [Heart Sounds] : normal S1 and S2 [Heart Sounds Gallop] : no gallops [Systolic grade ___/6] : A grade [unfilled]/6 systolic murmur was heard. [Edema] : there was no peripheral edema [Veins - Varicosity Changes] : there were no varicosital changes [Abdomen Soft] : soft [Abdomen Tenderness] : non-tender [Cervical Lymph Nodes Enlarged Anterior Bilaterally] : anterior cervical [Supraclavicular Lymph Nodes Enlarged Bilaterally] : supraclavicular [No CVA Tenderness] : no ~M costovertebral angle tenderness [Nail Clubbing] : no clubbing  or cyanosis of the fingernails [Musculoskeletal - Swelling] : no joint swelling seen [Antalgic Gait Bilateral] : antalgic bilaterally [Skin Color & Pigmentation] : normal skin color and pigmentation [Skin Turgor] : normal skin turgor [] : no rash [No Focal Deficits] : no focal deficits [Oriented To Time, Place, And Person] : oriented to person, place, and time [Impaired Insight] : insight and judgment were intact [Affect] : the affect was normal [FreeTextEntry1] : anxious

## 2020-11-20 NOTE — PLAN
[FreeTextEntry1] : 1. Continue with medication as outlined above.\par \par 2. The patient will now be started back on a bronchodilator for chronic use in the form Breo 200/25mcg, one puff daily.\par \par 3. The patient has been encouraged to follow up carefully with his psychiatrist regarding the anxiety\par \par 4. Routine fasting blood work will be performed at this time.\par \par 5. The patient will undergo a followup CAT scan of the chest in January to reevaluate the previously documented 4 mm left upper lobe subpleural groundglass opacity\par \par 6. Flu shot\par \par 7. Follow up with myself in 2 months full pulmonary function testing, and review of the most recent CAT scan.\par \par 8. Diet weight loss and an exercise regimen have been stressed to the patient.

## 2020-11-20 NOTE — HEALTH RISK ASSESSMENT
[Yes] : Yes [2 - 4 times a month (2 pts)] : 2-4 times a month (2 points) [1 or 2 (0 pts)] : 1 or 2 (0 points) [Never (0 pts)] : Never (0 points) [No] : In the past 12 months have you used drugs other than those required for medical reasons? No [1] : 1) Little interest or pleasure doing things for several days (1) [0] : 2) Feeling down, depressed, or hopeless: Not at all (0) [Patient reported colonoscopy was normal] : Patient reported colonoscopy was normal [Employed] : employed [] : No [HFY1Nadmi] : 1 [ColonoscopyDate] : 08/2020 [FreeTextEntry2] :

## 2020-11-20 NOTE — REVIEW OF SYSTEMS
[Fatigue] : fatigue [Recent Change In Weight] : ~T recent weight change [Palpitations] : palpitations [Shortness Of Breath] : shortness of breath [Anxiety] : anxiety [Negative] : Heme/Lymph

## 2020-11-20 NOTE — DATA REVIEWED
[FreeTextEntry1] : EKG shows sinus tachycardia at a rate of 101. There is low voltage. There are normal intervals and axis. There are no acute ST-T wave changes noted.

## 2021-01-05 ENCOUNTER — RX RENEWAL (OUTPATIENT)
Age: 51
End: 2021-01-05

## 2021-11-19 NOTE — H&P ADULT - GIT ABD PE PAL DETAILS PC
Detail Level: Detailed Detail Level: Simple Detail Level: Zone tender/distended/bowel sounds hyperactive

## 2022-02-02 ENCOUNTER — NON-APPOINTMENT (OUTPATIENT)
Age: 52
End: 2022-02-02

## 2022-02-03 ENCOUNTER — APPOINTMENT (OUTPATIENT)
Dept: UROLOGY | Facility: CLINIC | Age: 52
End: 2022-02-03
Payer: COMMERCIAL

## 2022-02-03 VITALS — DIASTOLIC BLOOD PRESSURE: 70 MMHG | OXYGEN SATURATION: 96 % | HEART RATE: 89 BPM | SYSTOLIC BLOOD PRESSURE: 140 MMHG

## 2022-02-03 DIAGNOSIS — N52.9 MALE ERECTILE DYSFUNCTION, UNSPECIFIED: ICD-10-CM

## 2022-02-03 PROCEDURE — 99213 OFFICE O/P EST LOW 20 MIN: CPT

## 2022-02-03 RX ORDER — TADALAFIL 20 MG/1
20 TABLET ORAL
Qty: 6 | Refills: 11 | Status: ACTIVE | COMMUNITY
Start: 2022-02-03 | End: 1900-01-01

## 2022-02-04 LAB
APPEARANCE: CLEAR
BACTERIA: NEGATIVE
BILIRUBIN URINE: NEGATIVE
BLOOD URINE: NEGATIVE
CALCIUM OXALATE CRYSTALS: ABNORMAL
COLOR: YELLOW
GLUCOSE QUALITATIVE U: NEGATIVE
HYALINE CASTS: 0 /LPF
KETONES URINE: NEGATIVE
LEUKOCYTE ESTERASE URINE: NEGATIVE
MICROSCOPIC-UA: NORMAL
NITRITE URINE: NEGATIVE
PH URINE: 7
PROTEIN URINE: NORMAL
RED BLOOD CELLS URINE: 0 /HPF
SPECIFIC GRAVITY URINE: 1.02
SQUAMOUS EPITHELIAL CELLS: 1 /HPF
UROBILINOGEN URINE: ABNORMAL
WHITE BLOOD CELLS URINE: 2 /HPF

## 2022-02-06 NOTE — PHYSICAL EXAM
[General Appearance - Well Developed] : well developed [General Appearance - Well Nourished] : well nourished [Normal Appearance] : normal appearance [Well Groomed] : well groomed [General Appearance - In No Acute Distress] : no acute distress [Abdomen Soft] : soft [Abdomen Tenderness] : non-tender [Costovertebral Angle Tenderness] : no ~M costovertebral angle tenderness [Urethral Meatus] : meatus normal [Urinary Bladder Findings] : the bladder was normal on palpation [Scrotum] : the scrotum was normal [Testes Mass (___cm)] : there were no testicular masses [FreeTextEntry1] : tender boggy prostate [Edema] : no peripheral edema [] : no respiratory distress [Respiration, Rhythm And Depth] : normal respiratory rhythm and effort [Exaggerated Use Of Accessory Muscles For Inspiration] : no accessory muscle use [Oriented To Time, Place, And Person] : oriented to person, place, and time [Affect] : the affect was normal [Mood] : the mood was normal [Not Anxious] : not anxious [Normal Station and Gait] : the gait and station were normal for the patient's age [No Focal Deficits] : no focal deficits [No Palpable Adenopathy] : no palpable adenopathy

## 2022-02-06 NOTE — HISTORY OF PRESENT ILLNESS
[FreeTextEntry1] : 48 yo M with Low Testosterone seen 3/2018 with labs (2/2018) Total 281, free 3.4. He reports depression, low libido and fatigue. It is associated with erectile dysfunction. Of note, pt was found to have elevated LFTs. Dr Damon gave clearance for TRT. He also reports mild LUTS with hesitancy. He was started on TRT and found 300 mg IM q2 weeks to control his symptoms. \par \par 1/3/19: Patient presents for follow up. He has been administering TRT injections without complaint. Good control of his hypogonadism symptoms. LUTS not worse or bothersome. No hematuria, no dysuria, no frequency, no urgency. No incontinence. No fevers, no chills, no nausea, no vomiting, no flank pain. \par He had been scheduled for labs prior to this visit, but he has not yet obtained them. \par \par 06/25/2020: Patient presents for follow up. He has not been seen since 1/2019. In that time he stopped administering TRT and developed gynecomastia. He reports fatigue, low libido. No new  symptoms and other medical  issues remain unchanged from above. No hematuria, no dysuria, no frequency, no urgency, no hesitancy, no straining. No incontinence. No fevers, no chills, no nausea, no vomiting, no flank pain. \par \par 02/03/2022: Patient presents for follow up. He reports perineal pain radiating to penis. Mild dysuria. No other acute complaints. He does report ED, and asking for renewal of tadalafil.

## 2022-02-06 NOTE — ASSESSMENT
[FreeTextEntry1] : 51 year old M with history and exam concerning for prostatitis. Recommended 2 week course of antibiotics. Pt will return for visit in 3 weeks for symptom check. If symptoms have resolved and not returned, we will observe. If symptoms improved, but returned with cessation of antibiotics, he will resume antibiotics for full 6 week course. If 2 weeks of antibiotics have no change in symptoms, will search for alternative diagnosis. Pt agrees and understands. \par \par For ED, tadalafil Rx placed.

## 2022-02-07 LAB — BACTERIA UR CULT: NORMAL

## 2022-02-17 ENCOUNTER — APPOINTMENT (OUTPATIENT)
Dept: UROLOGY | Facility: CLINIC | Age: 52
End: 2022-02-17

## 2022-02-22 ENCOUNTER — NON-APPOINTMENT (OUTPATIENT)
Age: 52
End: 2022-02-22

## 2022-03-18 ENCOUNTER — APPOINTMENT (OUTPATIENT)
Dept: UROLOGY | Facility: CLINIC | Age: 52
End: 2022-03-18
Payer: COMMERCIAL

## 2022-03-18 VITALS — TEMPERATURE: 98.1 F | SYSTOLIC BLOOD PRESSURE: 152 MMHG | DIASTOLIC BLOOD PRESSURE: 68 MMHG | HEART RATE: 90 BPM

## 2022-03-18 DIAGNOSIS — R60.0 LOCALIZED EDEMA: ICD-10-CM

## 2022-03-18 DIAGNOSIS — N41.0 ACUTE PROSTATITIS: ICD-10-CM

## 2022-03-18 DIAGNOSIS — N48.1 BALANITIS: ICD-10-CM

## 2022-03-18 PROCEDURE — 99213 OFFICE O/P EST LOW 20 MIN: CPT

## 2022-03-18 NOTE — HISTORY OF PRESENT ILLNESS
[FreeTextEntry1] : 46 yo M with Low Testosterone seen 3/2018 with labs (2/2018) Total 281, free 3.4. He reports depression, low libido and fatigue. It is associated with erectile dysfunction. Of note, pt was found to have elevated LFTs. Dr Damon gave clearance for TRT. He also reports mild LUTS with hesitancy. He was started on TRT and found 300 mg IM q2 weeks to control his symptoms. \par \par 1/3/19: Patient presents for follow up. He has been administering TRT injections without complaint. Good control of his hypogonadism symptoms. LUTS not worse or bothersome. No hematuria, no dysuria, no frequency, no urgency. No incontinence. No fevers, no chills, no nausea, no vomiting, no flank pain. \par He had been scheduled for labs prior to this visit, but he has not yet obtained them. \par \par 06/25/2020: Patient presents for follow up. He has not been seen since 1/2019. In that time he stopped administering TRT and developed gynecomastia. He reports fatigue, low libido. No new  symptoms and other medical  issues remain unchanged from above. No hematuria, no dysuria, no frequency, no urgency, no hesitancy, no straining. No incontinence. No fevers, no chills, no nausea, no vomiting, no flank pain. \par \par 02/03/2022: Patient presents for follow up. He reports perineal pain radiating to penis. Mild dysuria. No other acute complaints. He does report ED, and asking for renewal of tadalafil. \par \par 03/18/2022: Patient presents for follow up. He reports lower extremity rash and swelling, he atrributes to abx. He reports continued perineal pain, dribbling, dysuria. Rash on penis.

## 2022-03-18 NOTE — ASSESSMENT
[FreeTextEntry1] : 51 year old M with prostatitis, not improved with abx. Will change abx again, but will send for CT to r/o prostatic abscess. For LE edmea, will send for LE duplex but instructed pt to contact PCP. For balanitis, will Rx clotrimazole-betamethasone.

## 2022-03-21 ENCOUNTER — APPOINTMENT (OUTPATIENT)
Dept: ULTRASOUND IMAGING | Facility: CLINIC | Age: 52
End: 2022-03-21
Payer: COMMERCIAL

## 2022-03-21 ENCOUNTER — APPOINTMENT (OUTPATIENT)
Dept: CT IMAGING | Facility: CLINIC | Age: 52
End: 2022-03-21
Payer: COMMERCIAL

## 2022-03-21 ENCOUNTER — OUTPATIENT (OUTPATIENT)
Dept: OUTPATIENT SERVICES | Facility: HOSPITAL | Age: 52
LOS: 1 days | End: 2022-03-21
Payer: COMMERCIAL

## 2022-03-21 DIAGNOSIS — Z98.1 ARTHRODESIS STATUS: Chronic | ICD-10-CM

## 2022-03-21 DIAGNOSIS — R60.0 LOCALIZED EDEMA: ICD-10-CM

## 2022-03-21 DIAGNOSIS — Z96.651 PRESENCE OF RIGHT ARTIFICIAL KNEE JOINT: Chronic | ICD-10-CM

## 2022-03-21 DIAGNOSIS — N41.0 ACUTE PROSTATITIS: ICD-10-CM

## 2022-03-21 DIAGNOSIS — Z00.8 ENCOUNTER FOR OTHER GENERAL EXAMINATION: ICD-10-CM

## 2022-03-21 PROCEDURE — 93970 EXTREMITY STUDY: CPT | Mod: 26

## 2022-03-21 PROCEDURE — 82565 ASSAY OF CREATININE: CPT

## 2022-03-21 PROCEDURE — 72193 CT PELVIS W/DYE: CPT

## 2022-03-21 PROCEDURE — 72193 CT PELVIS W/DYE: CPT | Mod: 26

## 2022-03-21 PROCEDURE — 93970 EXTREMITY STUDY: CPT

## 2022-03-30 RX ORDER — CEFPODOXIME PROXETIL 100 MG
1 TABLET ORAL
Qty: 0 | Refills: 0 | DISCHARGE
Start: 2022-03-30

## 2022-04-14 ENCOUNTER — INPATIENT (INPATIENT)
Facility: HOSPITAL | Age: 52
LOS: 5 days | Discharge: HOME | DRG: 441 | End: 2022-04-20
Attending: INTERNAL MEDICINE | Admitting: INTERNAL MEDICINE
Payer: COMMERCIAL

## 2022-04-14 VITALS
TEMPERATURE: 99 F | HEART RATE: 76 BPM | WEIGHT: 309.97 LBS | HEIGHT: 72 IN | SYSTOLIC BLOOD PRESSURE: 109 MMHG | DIASTOLIC BLOOD PRESSURE: 74 MMHG | RESPIRATION RATE: 18 BRPM | OXYGEN SATURATION: 92 %

## 2022-04-14 DIAGNOSIS — Z98.1 ARTHRODESIS STATUS: Chronic | ICD-10-CM

## 2022-04-14 DIAGNOSIS — G92.8 OTHER TOXIC ENCEPHALOPATHY: ICD-10-CM

## 2022-04-14 DIAGNOSIS — Z96.651 PRESENCE OF RIGHT ARTIFICIAL KNEE JOINT: Chronic | ICD-10-CM

## 2022-04-14 LAB
ALBUMIN SERPL ELPH-MCNC: 3.7 G/DL — SIGNIFICANT CHANGE UP (ref 3.3–5)
ALP SERPL-CCNC: 100 U/L — SIGNIFICANT CHANGE UP (ref 40–120)
ALT FLD-CCNC: 41 U/L — SIGNIFICANT CHANGE UP (ref 12–78)
AMMONIA BLD-MCNC: 63 UMOL/L — HIGH (ref 11–32)
ANION GAP SERPL CALC-SCNC: 7 MMOL/L — SIGNIFICANT CHANGE UP (ref 5–17)
APTT BLD: 37.1 SEC — HIGH (ref 27.5–35.5)
AST SERPL-CCNC: 46 U/L — HIGH (ref 15–37)
BASOPHILS # BLD AUTO: 0.06 K/UL — SIGNIFICANT CHANGE UP (ref 0–0.2)
BASOPHILS NFR BLD AUTO: 0.8 % — SIGNIFICANT CHANGE UP (ref 0–2)
BILIRUB SERPL-MCNC: 1.5 MG/DL — HIGH (ref 0.2–1.2)
BLD GP AB SCN SERPL QL: SIGNIFICANT CHANGE UP
BUN SERPL-MCNC: 50 MG/DL — HIGH (ref 7–23)
CALCIUM SERPL-MCNC: 9.4 MG/DL — SIGNIFICANT CHANGE UP (ref 8.5–10.1)
CHLORIDE SERPL-SCNC: 105 MMOL/L — SIGNIFICANT CHANGE UP (ref 96–108)
CO2 SERPL-SCNC: 21 MMOL/L — LOW (ref 22–31)
CREAT SERPL-MCNC: 3.08 MG/DL — HIGH (ref 0.5–1.3)
EGFR: 23 ML/MIN/1.73M2 — LOW
EOSINOPHIL # BLD AUTO: 0.12 K/UL — SIGNIFICANT CHANGE UP (ref 0–0.5)
EOSINOPHIL NFR BLD AUTO: 1.6 % — SIGNIFICANT CHANGE UP (ref 0–6)
ETHANOL SERPL-MCNC: <10 MG/DL — SIGNIFICANT CHANGE UP (ref 0–10)
GLUCOSE SERPL-MCNC: 125 MG/DL — HIGH (ref 70–99)
HCT VFR BLD CALC: 37.6 % — LOW (ref 39–50)
HGB BLD-MCNC: 12.6 G/DL — LOW (ref 13–17)
IMM GRANULOCYTES NFR BLD AUTO: 0.4 % — SIGNIFICANT CHANGE UP (ref 0–1.5)
INR BLD: 1.25 RATIO — HIGH (ref 0.88–1.16)
LACTATE SERPL-SCNC: 1.7 MMOL/L — SIGNIFICANT CHANGE UP (ref 0.7–2)
LIDOCAIN IGE QN: 219 U/L — SIGNIFICANT CHANGE UP (ref 73–393)
LYMPHOCYTES # BLD AUTO: 2.31 K/UL — SIGNIFICANT CHANGE UP (ref 1–3.3)
LYMPHOCYTES # BLD AUTO: 30.2 % — SIGNIFICANT CHANGE UP (ref 13–44)
MCHC RBC-ENTMCNC: 33.5 GM/DL — SIGNIFICANT CHANGE UP (ref 32–36)
MCHC RBC-ENTMCNC: 33.9 PG — SIGNIFICANT CHANGE UP (ref 27–34)
MCV RBC AUTO: 101.1 FL — HIGH (ref 80–100)
MONOCYTES # BLD AUTO: 0.89 K/UL — SIGNIFICANT CHANGE UP (ref 0–0.9)
MONOCYTES NFR BLD AUTO: 11.6 % — SIGNIFICANT CHANGE UP (ref 2–14)
NEUTROPHILS # BLD AUTO: 4.24 K/UL — SIGNIFICANT CHANGE UP (ref 1.8–7.4)
NEUTROPHILS NFR BLD AUTO: 55.4 % — SIGNIFICANT CHANGE UP (ref 43–77)
PLATELET # BLD AUTO: 89 K/UL — LOW (ref 150–400)
POTASSIUM SERPL-MCNC: 5.2 MMOL/L — SIGNIFICANT CHANGE UP (ref 3.5–5.3)
POTASSIUM SERPL-SCNC: 5.2 MMOL/L — SIGNIFICANT CHANGE UP (ref 3.5–5.3)
PROT SERPL-MCNC: 8.2 GM/DL — SIGNIFICANT CHANGE UP (ref 6–8.3)
PROTHROM AB SERPL-ACNC: 14.5 SEC — HIGH (ref 10.5–13.4)
RAPID RVP RESULT: SIGNIFICANT CHANGE UP
RBC # BLD: 3.72 M/UL — LOW (ref 4.2–5.8)
RBC # FLD: 12.6 % — SIGNIFICANT CHANGE UP (ref 10.3–14.5)
SARS-COV-2 RNA SPEC QL NAA+PROBE: SIGNIFICANT CHANGE UP
SODIUM SERPL-SCNC: 133 MMOL/L — LOW (ref 135–145)
WBC # BLD: 7.65 K/UL — SIGNIFICANT CHANGE UP (ref 3.8–10.5)
WBC # FLD AUTO: 7.65 K/UL — SIGNIFICANT CHANGE UP (ref 3.8–10.5)

## 2022-04-14 PROCEDURE — 88307 TISSUE EXAM BY PATHOLOGIST: CPT

## 2022-04-14 PROCEDURE — 70450 CT HEAD/BRAIN W/O DYE: CPT | Mod: 26,MA

## 2022-04-14 PROCEDURE — 99285 EMERGENCY DEPT VISIT HI MDM: CPT

## 2022-04-14 PROCEDURE — 86039 ANTINUCLEAR ANTIBODIES (ANA): CPT

## 2022-04-14 PROCEDURE — 80053 COMPREHEN METABOLIC PANEL: CPT

## 2022-04-14 PROCEDURE — 71045 X-RAY EXAM CHEST 1 VIEW: CPT | Mod: 26

## 2022-04-14 PROCEDURE — 84100 ASSAY OF PHOSPHORUS: CPT

## 2022-04-14 PROCEDURE — 82746 ASSAY OF FOLIC ACID SERUM: CPT

## 2022-04-14 PROCEDURE — 85652 RBC SED RATE AUTOMATED: CPT

## 2022-04-14 PROCEDURE — 94640 AIRWAY INHALATION TREATMENT: CPT

## 2022-04-14 PROCEDURE — 82140 ASSAY OF AMMONIA: CPT

## 2022-04-14 PROCEDURE — 36415 COLL VENOUS BLD VENIPUNCTURE: CPT

## 2022-04-14 PROCEDURE — 83935 ASSAY OF URINE OSMOLALITY: CPT

## 2022-04-14 PROCEDURE — 71250 CT THORAX DX C-: CPT

## 2022-04-14 PROCEDURE — 83690 ASSAY OF LIPASE: CPT

## 2022-04-14 PROCEDURE — 80048 BASIC METABOLIC PNL TOTAL CA: CPT

## 2022-04-14 PROCEDURE — 85025 COMPLETE CBC W/AUTO DIFF WBC: CPT

## 2022-04-14 PROCEDURE — 87086 URINE CULTURE/COLONY COUNT: CPT

## 2022-04-14 PROCEDURE — U0003: CPT

## 2022-04-14 PROCEDURE — 80076 HEPATIC FUNCTION PANEL: CPT

## 2022-04-14 PROCEDURE — 83735 ASSAY OF MAGNESIUM: CPT

## 2022-04-14 PROCEDURE — 85027 COMPLETE CBC AUTOMATED: CPT

## 2022-04-14 PROCEDURE — 85049 AUTOMATED PLATELET COUNT: CPT

## 2022-04-14 PROCEDURE — 82607 VITAMIN B-12: CPT

## 2022-04-14 PROCEDURE — 82728 ASSAY OF FERRITIN: CPT

## 2022-04-14 PROCEDURE — U0005: CPT

## 2022-04-14 PROCEDURE — 84443 ASSAY THYROID STIM HORMONE: CPT

## 2022-04-14 PROCEDURE — 84156 ASSAY OF PROTEIN URINE: CPT

## 2022-04-14 PROCEDURE — 84439 ASSAY OF FREE THYROXINE: CPT

## 2022-04-14 PROCEDURE — C2613: CPT

## 2022-04-14 PROCEDURE — 76942 ECHO GUIDE FOR BIOPSY: CPT

## 2022-04-14 PROCEDURE — 47000 NEEDLE BIOPSY OF LIVER PERQ: CPT

## 2022-04-14 PROCEDURE — 85610 PROTHROMBIN TIME: CPT

## 2022-04-14 PROCEDURE — 85730 THROMBOPLASTIN TIME PARTIAL: CPT

## 2022-04-14 PROCEDURE — 82570 ASSAY OF URINE CREATININE: CPT

## 2022-04-14 PROCEDURE — 88313 SPECIAL STAINS GROUP 2: CPT

## 2022-04-14 PROCEDURE — 81003 URINALYSIS AUTO W/O SCOPE: CPT

## 2022-04-14 PROCEDURE — 83930 ASSAY OF BLOOD OSMOLALITY: CPT

## 2022-04-14 PROCEDURE — 74181 MRI ABDOMEN W/O CONTRAST: CPT

## 2022-04-14 PROCEDURE — 80061 LIPID PANEL: CPT

## 2022-04-14 PROCEDURE — 74176 CT ABD & PELVIS W/O CONTRAST: CPT | Mod: 26,MA

## 2022-04-14 RX ORDER — SODIUM CHLORIDE 9 MG/ML
1000 INJECTION INTRAMUSCULAR; INTRAVENOUS; SUBCUTANEOUS ONCE
Refills: 0 | Status: COMPLETED | OUTPATIENT
Start: 2022-04-14 | End: 2022-04-14

## 2022-04-14 RX ADMIN — SODIUM CHLORIDE 1000 MILLILITER(S): 9 INJECTION INTRAMUSCULAR; INTRAVENOUS; SUBCUTANEOUS at 21:52

## 2022-04-14 NOTE — ED PROVIDER NOTE - CLINICAL SUMMARY MEDICAL DECISION MAKING FREE TEXT BOX
Will evaluate for hepatic encephalopathy. pt with complete normal mental status now, no suggestion of acute intra-abdominal pathology, do not suspect SBP. Disp pending labs, imaging, reassessment. Will evaluate for hepatic encephalopathy. pt with completely normal mental status at this time, no suggestion of acute intra-abdominal pathology, do not suspect SBP. Disp pending labs, imaging, reassessment.

## 2022-04-14 NOTE — ED PROVIDER NOTE - PHYSICAL EXAMINATION
Constitutional: NAD, well appearing  HEENT: no rhinorrhea, PERRL, no oropharyngeal erythema or exudates, midline uvula.  TMs clear.  CVS:  RRR, no m/r/g  Resp:  CTAB  GI: soft, nt, +distension  MSK:  no restriction to rom, full ROM to all extremities  Neuro:  A&Ox3, 5/5 strength to all extremities,  SILT to all extremities  Skin: no rash  psych: clear thought content  Heme/lymph:  No LAD

## 2022-04-14 NOTE — ED PROVIDER NOTE - PROGRESS NOTE DETAILS
Pt with melisa and hyperammonemia, but mental status presently at baseline.  CT findings noted.  Will admit as per outpatient physician recommendations.  Further care per inpatient treatment team.

## 2022-04-14 NOTE — ED ADULT TRIAGE NOTE - CHIEF COMPLAINT QUOTE
pt presents to ed via ems per md nagel for evaluation of hepatic encephalopathy and admission- pt has hx of liver failure. per pt and ems, pt has been having episodes of AMS that last for days then resolve. in ed pt a&0x4 vitals stable

## 2022-04-14 NOTE — ED PROVIDER NOTE - NSTIMEPROVIDERCAREINITIATE_GEN_ER
Bed: ED19  Expected date: 2/21/22  Expected time: 1:17 PM  Means of arrival: Ambulance  Comments:  Jonna 5[birth year]- 56y, F, COPD   14-Apr-2022 18:06

## 2022-04-14 NOTE — ED ADULT NURSE NOTE - OBJECTIVE STATEMENT
Pt presents to er with complaints of ruq pain and history of hernia Pt presents to er with complaints of ruq pain and history of hernia with mesh repair, denies emesis/diarrhea, fevers at this time, alert to person, place, situation at this time, abd distended, pain to palpation to ruq and upper/mid/abd, stretcher in lowest position, call bell at side, pt updated on plan at this time, will continue to monitor.

## 2022-04-14 NOTE — ED PROVIDER NOTE - NS ED ROS FT
Constitutional: nad, well appearing  HEENT:  no nasal congestion, eye drainage or ear pain.    CVS:  no cp  Resp:  No sob, no cough  GI: +abdominal distension. no abdominal pain, no nausea or vomiting  :  no dysuria  MSK: +b/l LE edema, no joint pain or limited ROM  Skin: no rash  Neuro: +AMS  Heme/lymph: no bleeding

## 2022-04-14 NOTE — ED PROVIDER NOTE - OBJECTIVE STATEMENT
50 y/o male with a PMHX of HTN, umbilical hernia, GERD, chronic back pain, sleep apnea, RA, lyme disease presents to the ED with abd distensio, b/l LE edema and intermittent AMS. Pt states the AMS would last for a few days, then self resolved. Pt reports he went to see his PMD and was referred to the ED for eval. No new abd pain outside of his umbilical hernia, no chest pain, no SOB, no numbness, no weakness, no tingling. Pt with no other complaints at this time.

## 2022-04-15 ENCOUNTER — TRANSCRIPTION ENCOUNTER (OUTPATIENT)
Age: 52
End: 2022-04-15

## 2022-04-15 DIAGNOSIS — N17.9 ACUTE KIDNEY FAILURE, UNSPECIFIED: ICD-10-CM

## 2022-04-15 LAB
ALBUMIN SERPL ELPH-MCNC: 3.7 G/DL — SIGNIFICANT CHANGE UP (ref 3.3–5)
ALP SERPL-CCNC: 99 U/L — SIGNIFICANT CHANGE UP (ref 40–120)
ALT FLD-CCNC: 43 U/L — SIGNIFICANT CHANGE UP (ref 12–78)
ANION GAP SERPL CALC-SCNC: 6 MMOL/L — SIGNIFICANT CHANGE UP (ref 5–17)
ANION GAP SERPL CALC-SCNC: 9 MMOL/L — SIGNIFICANT CHANGE UP (ref 5–17)
APPEARANCE UR: CLEAR — SIGNIFICANT CHANGE UP
AST SERPL-CCNC: 48 U/L — HIGH (ref 15–37)
BILIRUB SERPL-MCNC: 1.3 MG/DL — HIGH (ref 0.2–1.2)
BILIRUB UR-MCNC: NEGATIVE — SIGNIFICANT CHANGE UP
BUN SERPL-MCNC: 36 MG/DL — HIGH (ref 7–23)
BUN SERPL-MCNC: 41 MG/DL — HIGH (ref 7–23)
CALCIUM SERPL-MCNC: 9.6 MG/DL — SIGNIFICANT CHANGE UP (ref 8.5–10.1)
CALCIUM SERPL-MCNC: 9.7 MG/DL — SIGNIFICANT CHANGE UP (ref 8.5–10.1)
CHLORIDE SERPL-SCNC: 105 MMOL/L — SIGNIFICANT CHANGE UP (ref 96–108)
CHLORIDE SERPL-SCNC: 106 MMOL/L — SIGNIFICANT CHANGE UP (ref 96–108)
CHOLEST SERPL-MCNC: 209 MG/DL — HIGH
CO2 SERPL-SCNC: 23 MMOL/L — SIGNIFICANT CHANGE UP (ref 22–31)
CO2 SERPL-SCNC: 25 MMOL/L — SIGNIFICANT CHANGE UP (ref 22–31)
COLOR SPEC: YELLOW — SIGNIFICANT CHANGE UP
CREAT ?TM UR-MCNC: 47 MG/DL — SIGNIFICANT CHANGE UP
CREAT SERPL-MCNC: 2.03 MG/DL — HIGH (ref 0.5–1.3)
CREAT SERPL-MCNC: 2.31 MG/DL — HIGH (ref 0.5–1.3)
DIFF PNL FLD: NEGATIVE — SIGNIFICANT CHANGE UP
EGFR: 33 ML/MIN/1.73M2 — LOW
EGFR: 38 ML/MIN/1.73M2 — LOW
ERYTHROCYTE [SEDIMENTATION RATE] IN BLOOD: 29 MM/HR — HIGH (ref 0–20)
GLUCOSE SERPL-MCNC: 150 MG/DL — HIGH (ref 70–99)
GLUCOSE SERPL-MCNC: 151 MG/DL — HIGH (ref 70–99)
GLUCOSE UR QL: 50 MG/DL
HCT VFR BLD CALC: 38.5 % — LOW (ref 39–50)
HDLC SERPL-MCNC: 29 MG/DL — LOW
HGB BLD-MCNC: 12.8 G/DL — LOW (ref 13–17)
INR BLD: 1.27 RATIO — HIGH (ref 0.88–1.16)
KETONES UR-MCNC: NEGATIVE — SIGNIFICANT CHANGE UP
LEUKOCYTE ESTERASE UR-ACNC: NEGATIVE — SIGNIFICANT CHANGE UP
LIDOCAIN IGE QN: 228 U/L — SIGNIFICANT CHANGE UP (ref 73–393)
LIPID PNL WITH DIRECT LDL SERPL: 130 MG/DL — HIGH
MAGNESIUM SERPL-MCNC: 2.3 MG/DL — SIGNIFICANT CHANGE UP (ref 1.6–2.6)
MCHC RBC-ENTMCNC: 33.2 GM/DL — SIGNIFICANT CHANGE UP (ref 32–36)
MCHC RBC-ENTMCNC: 34 PG — SIGNIFICANT CHANGE UP (ref 27–34)
MCV RBC AUTO: 102.1 FL — HIGH (ref 80–100)
NITRITE UR-MCNC: NEGATIVE — SIGNIFICANT CHANGE UP
NON HDL CHOLESTEROL: 180 MG/DL — HIGH
PH UR: 6 — SIGNIFICANT CHANGE UP (ref 5–8)
PHOSPHATE SERPL-MCNC: 3.9 MG/DL — SIGNIFICANT CHANGE UP (ref 2.5–4.5)
PLATELET # BLD AUTO: 76 K/UL — LOW (ref 150–400)
POTASSIUM SERPL-MCNC: 4.8 MMOL/L — SIGNIFICANT CHANGE UP (ref 3.5–5.3)
POTASSIUM SERPL-MCNC: 5.5 MMOL/L — HIGH (ref 3.5–5.3)
POTASSIUM SERPL-SCNC: 4.8 MMOL/L — SIGNIFICANT CHANGE UP (ref 3.5–5.3)
POTASSIUM SERPL-SCNC: 5.5 MMOL/L — HIGH (ref 3.5–5.3)
PROT ?TM UR-MCNC: 12 MG/DL — SIGNIFICANT CHANGE UP (ref 0–12)
PROT SERPL-MCNC: 8.2 GM/DL — SIGNIFICANT CHANGE UP (ref 6–8.3)
PROT UR-MCNC: NEGATIVE — SIGNIFICANT CHANGE UP
PROT/CREAT UR-RTO: 0.3 RATIO — HIGH (ref 0–0.2)
PROTHROM AB SERPL-ACNC: 14.8 SEC — HIGH (ref 10.5–13.4)
RBC # BLD: 3.77 M/UL — LOW (ref 4.2–5.8)
RBC # FLD: 12.6 % — SIGNIFICANT CHANGE UP (ref 10.3–14.5)
SODIUM SERPL-SCNC: 137 MMOL/L — SIGNIFICANT CHANGE UP (ref 135–145)
SODIUM SERPL-SCNC: 137 MMOL/L — SIGNIFICANT CHANGE UP (ref 135–145)
SP GR SPEC: 1.01 — SIGNIFICANT CHANGE UP (ref 1.01–1.02)
TRIGL SERPL-MCNC: 248 MG/DL — HIGH
UROBILINOGEN FLD QL: NEGATIVE — SIGNIFICANT CHANGE UP
WBC # BLD: 5.77 K/UL — SIGNIFICANT CHANGE UP (ref 3.8–10.5)
WBC # FLD AUTO: 5.77 K/UL — SIGNIFICANT CHANGE UP (ref 3.8–10.5)

## 2022-04-15 PROCEDURE — 74181 MRI ABDOMEN W/O CONTRAST: CPT | Mod: 26

## 2022-04-15 PROCEDURE — 99223 1ST HOSP IP/OBS HIGH 75: CPT

## 2022-04-15 PROCEDURE — 71250 CT THORAX DX C-: CPT | Mod: 26

## 2022-04-15 RX ORDER — METOPROLOL TARTRATE 50 MG
50 TABLET ORAL DAILY
Refills: 0 | Status: DISCONTINUED | OUTPATIENT
Start: 2022-04-15 | End: 2022-04-20

## 2022-04-15 RX ORDER — FOLIC ACID 0.8 MG
1 TABLET ORAL DAILY
Refills: 0 | Status: DISCONTINUED | OUTPATIENT
Start: 2022-04-15 | End: 2022-04-17

## 2022-04-15 RX ORDER — QUETIAPINE FUMARATE 200 MG/1
50 TABLET, FILM COATED ORAL AT BEDTIME
Refills: 0 | Status: DISCONTINUED | OUTPATIENT
Start: 2022-04-15 | End: 2022-04-16

## 2022-04-15 RX ORDER — TRAZODONE HCL 50 MG
150 TABLET ORAL AT BEDTIME
Refills: 0 | Status: DISCONTINUED | OUTPATIENT
Start: 2022-04-15 | End: 2022-04-16

## 2022-04-15 RX ORDER — ONDANSETRON 8 MG/1
4 TABLET, FILM COATED ORAL EVERY 6 HOURS
Refills: 0 | Status: DISCONTINUED | OUTPATIENT
Start: 2022-04-15 | End: 2022-04-20

## 2022-04-15 RX ORDER — ZOLPIDEM TARTRATE 10 MG/1
5 TABLET ORAL AT BEDTIME
Refills: 0 | Status: DISCONTINUED | OUTPATIENT
Start: 2022-04-15 | End: 2022-04-16

## 2022-04-15 RX ORDER — CLONAZEPAM 1 MG
1 TABLET ORAL
Qty: 0 | Refills: 0 | DISCHARGE

## 2022-04-15 RX ORDER — SENNA PLUS 8.6 MG/1
2 TABLET ORAL AT BEDTIME
Refills: 0 | Status: DISCONTINUED | OUTPATIENT
Start: 2022-04-15 | End: 2022-04-20

## 2022-04-15 RX ORDER — ALBUTEROL 90 UG/1
2 AEROSOL, METERED ORAL
Qty: 0 | Refills: 0 | DISCHARGE

## 2022-04-15 RX ORDER — FUROSEMIDE 40 MG
40 TABLET ORAL AT BEDTIME
Refills: 0 | Status: DISCONTINUED | OUTPATIENT
Start: 2022-04-15 | End: 2022-04-20

## 2022-04-15 RX ORDER — OMEPRAZOLE 10 MG/1
1 CAPSULE, DELAYED RELEASE ORAL
Qty: 0 | Refills: 0 | DISCHARGE

## 2022-04-15 RX ORDER — THIAMINE MONONITRATE (VIT B1) 100 MG
100 TABLET ORAL ONCE
Refills: 0 | Status: COMPLETED | OUTPATIENT
Start: 2022-04-15 | End: 2022-04-15

## 2022-04-15 RX ORDER — FLUTICASONE PROPIONATE 50 MCG
1 SPRAY, SUSPENSION NASAL
Refills: 0 | Status: DISCONTINUED | OUTPATIENT
Start: 2022-04-15 | End: 2022-04-20

## 2022-04-15 RX ORDER — IBUPROFEN 200 MG
3 TABLET ORAL
Qty: 0 | Refills: 0 | DISCHARGE

## 2022-04-15 RX ORDER — LACTULOSE 10 G/15ML
10 SOLUTION ORAL THREE TIMES A DAY
Refills: 0 | Status: DISCONTINUED | OUTPATIENT
Start: 2022-04-15 | End: 2022-04-16

## 2022-04-15 RX ORDER — SODIUM CHLORIDE 9 MG/ML
1000 INJECTION INTRAMUSCULAR; INTRAVENOUS; SUBCUTANEOUS
Refills: 0 | Status: DISCONTINUED | OUTPATIENT
Start: 2022-04-15 | End: 2022-04-15

## 2022-04-15 RX ORDER — SODIUM ZIRCONIUM CYCLOSILICATE 10 G/10G
10 POWDER, FOR SUSPENSION ORAL ONCE
Refills: 0 | Status: COMPLETED | OUTPATIENT
Start: 2022-04-15 | End: 2022-04-16

## 2022-04-15 RX ORDER — OXYCODONE HYDROCHLORIDE 5 MG/1
15 TABLET ORAL EVERY 6 HOURS
Refills: 0 | Status: DISCONTINUED | OUTPATIENT
Start: 2022-04-15 | End: 2022-04-20

## 2022-04-15 RX ORDER — ESCITALOPRAM OXALATE 10 MG/1
20 TABLET, FILM COATED ORAL DAILY
Refills: 0 | Status: DISCONTINUED | OUTPATIENT
Start: 2022-04-15 | End: 2022-04-20

## 2022-04-15 RX ORDER — SPIRONOLACTONE 25 MG/1
50 TABLET, FILM COATED ORAL DAILY
Refills: 0 | Status: DISCONTINUED | OUTPATIENT
Start: 2022-04-15 | End: 2022-04-15

## 2022-04-15 RX ORDER — TRAZODONE HCL 50 MG
1 TABLET ORAL
Qty: 0 | Refills: 0 | DISCHARGE

## 2022-04-15 RX ORDER — OXYCODONE HYDROCHLORIDE 5 MG/1
15 TABLET ORAL ONCE
Refills: 0 | Status: DISCONTINUED | OUTPATIENT
Start: 2022-04-15 | End: 2022-04-15

## 2022-04-15 RX ADMIN — Medication 1 MILLIGRAM(S): at 16:38

## 2022-04-15 RX ADMIN — Medication 150 MILLIGRAM(S): at 21:51

## 2022-04-15 RX ADMIN — OXYCODONE HYDROCHLORIDE 15 MILLIGRAM(S): 5 TABLET ORAL at 04:40

## 2022-04-15 RX ADMIN — QUETIAPINE FUMARATE 50 MILLIGRAM(S): 200 TABLET, FILM COATED ORAL at 21:51

## 2022-04-15 RX ADMIN — Medication 100 MILLIGRAM(S): at 16:39

## 2022-04-15 RX ADMIN — OXYCODONE HYDROCHLORIDE 15 MILLIGRAM(S): 5 TABLET ORAL at 11:01

## 2022-04-15 RX ADMIN — Medication 40 MILLIGRAM(S): at 21:51

## 2022-04-15 RX ADMIN — SPIRONOLACTONE 50 MILLIGRAM(S): 25 TABLET, FILM COATED ORAL at 16:38

## 2022-04-15 RX ADMIN — Medication 1 TABLET(S): at 16:38

## 2022-04-15 RX ADMIN — OXYCODONE HYDROCHLORIDE 15 MILLIGRAM(S): 5 TABLET ORAL at 18:00

## 2022-04-15 RX ADMIN — OXYCODONE HYDROCHLORIDE 15 MILLIGRAM(S): 5 TABLET ORAL at 12:56

## 2022-04-15 RX ADMIN — Medication 50 MILLIGRAM(S): at 16:38

## 2022-04-15 RX ADMIN — OXYCODONE HYDROCHLORIDE 15 MILLIGRAM(S): 5 TABLET ORAL at 03:48

## 2022-04-15 RX ADMIN — OXYCODONE HYDROCHLORIDE 15 MILLIGRAM(S): 5 TABLET ORAL at 23:02

## 2022-04-15 RX ADMIN — ESCITALOPRAM OXALATE 20 MILLIGRAM(S): 10 TABLET, FILM COATED ORAL at 11:01

## 2022-04-15 RX ADMIN — SENNA PLUS 2 TABLET(S): 8.6 TABLET ORAL at 21:51

## 2022-04-15 RX ADMIN — OXYCODONE HYDROCHLORIDE 15 MILLIGRAM(S): 5 TABLET ORAL at 17:00

## 2022-04-15 RX ADMIN — SODIUM CHLORIDE 75 MILLILITER(S): 9 INJECTION INTRAMUSCULAR; INTRAVENOUS; SUBCUTANEOUS at 12:56

## 2022-04-15 RX ADMIN — Medication 1 SPRAY(S): at 21:51

## 2022-04-15 NOTE — PHARMACOTHERAPY INTERVENTION NOTE - COMMENTS
Medication reconciliation completed.  Reviewed Medication list and confirmed med allergies with patient; confirmed with Dr. First Medchristelle.

## 2022-04-15 NOTE — H&P ADULT - NSHPLABSRESULTS_GEN_ALL_CORE
-                12.8   5.77  )-----------( 76       ( 15 Apr 2022 09:06 )             38.5     04-15    137  |  106  |  41<H>  ----------------------------<  150<H>  4.8   |  25  |  2.31<H>    Ca    9.7      15 Apr 2022 09:06    TPro  8.2  /  Alb  3.7  /  TBili  1.3<H>  /  DBili  x   /  AST  48<H>  /  ALT  43  /  AlkPhos  99  04-15        LIVER FUNCTIONS - ( 15 Apr 2022 09:06 )  Alb: 3.7 g/dL / Pro: 8.2 gm/dL / ALK PHOS: 99 U/L / ALT: 43 U/L / AST: 48 U/L / GGT: x           PT/INR - ( 15 Apr 2022 09:06 )   PT: 14.8 sec;   INR: 1.27 ratio       Ammonia, Serum: 63 umol/L (04.14.22 @ 21:26)    PTT - ( 14 Apr 2022 18:17 )  PTT:37.1 sec      Lactate, Blood: 1.7 mmol/L (04-14 @ 21:26)    < from: CT Abdomen and Pelvis No Cont (04.14.22 @ 21:51) >  IMPRESSION:    Cholelithiasis.    Suggestion of liver surface nodularity and steatosis. No ascites.    Suggestion of subtle peritoneal haziness along with subtle nodularities.   Collateral vessels identified within the abdomen, limiting detailed   evaluation. Consider nonemergent correlation with PET/CT to exclude   peritoneal carcinomatosis or metastatic disease.    Vague bilateral lung ground glass opacities. Few scattered bilateral   subpleural based nodules with representative left lower lobe nodule   measuring 1.2 cm. Consider nonemergent pulmonary imaging follow-up to   exclude hepatic metastasis.    --- End of Report ---    BOB YU MD; Attending Radiologist  This document has been electronically signed. Apr 14 2022 10:52PM    < from: CT Chest No Cont (04.15.22 @ 11:24) >      IMPRESSION: No pulmonary parenchymal nodules are noted.    Patchy ground glass opacities in the peripheral aspect of the right middle   and both lower lobes are of uncertain etiology. Follow-up CT scan is   recommended in 3 months to ensure resolution.    --- End of Report ---      ASIF SUAREZ MD; Attending Radiologist  This document has been electronically signed. Apr 15 2022 11:32AM    < end of copied text >

## 2022-04-15 NOTE — H&P ADULT - HISTORY OF PRESENT ILLNESS
50 y/o male w/ pmhx of HTN, umbilical hernia, GERD, chronic back pain, sleep apnea, RA, lyme disease, chronic liver failure on Xifaxin,  presenting with abd distension, bilat leg edema and intermittent AMS for past several days.     CT abd-  Suggestion of subtle peritoneal haziness along  with subtle nodularities. There are collateral vessels identified within the abdomen, limiting detailed evaluation. Consider nonemergent correlation with PET/CT to exclude peritoneal carcinomatosis or metastatic disease.  Ammonia level elevated at 63 and creatinine 3.08     MELD score 19  52 y/o male w/ pmhx of HTN, umbilical hernia, GERD, chronic back pain, sleep apnea, RA, lyme disease, chronic liver failure on Xifaxin,  presenting with abd distension bilat leg edema and intermittent AMS for past several days.     CT abd-  Suggestion of subtle peritoneal haziness along  with subtle nodularities. There are collateral vessels identified within the abdomen, limiting detailed evaluation. Consider nonemergent correlation with PET/CT to exclude peritoneal carcinomatosis or metastatic disease.  Ammonia level elevated at 63 and creatinine 3.08     MELD score 19

## 2022-04-15 NOTE — H&P ADULT - NSHPSOCIALHISTORY_GEN_ALL_CORE
domiciled with wife and 3 of 4 children. works as . denies tobacco or e cig use. reports  1-2 beers every other day for several years but stopped after he was told his liver was inflamed.

## 2022-04-15 NOTE — H&P ADULT - NSHPADDITIONALINFOADULT_GEN_ALL_CORE
Attending note: Patient seen and examined with RHIANNA Saravia  Case reviewed and discussed with her and necessary changes were made  Agree with her assessment and plan.

## 2022-04-15 NOTE — H&P ADULT - NSHPPHYSICALEXAM_GEN_ALL_CORE
VITALS:  T(F): 97.6 (04-15-22 @ 08:13), Max: 98.8 (04-14-22 @ 18:28)  HR: 74 (04-15-22 @ 08:13) (74 - 81)  BP: 133/63 (04-15-22 @ 08:13) (109/74 - 150/63)  RR: 17 (04-15-22 @ 08:13) (16 - 18)  SpO2: 94% (04-15-22 @ 08:13) (92% - 99%)  Wt(kg): --    I&O's Summary    15 Apr 2022 07:01  -  15 Apr 2022 13:56  --------------------------------------------------------  IN: 0 mL / OUT: 1000 mL / NET: -1000 mL        CAPILLARY BLOOD GLUCOSE          PHYSICAL EXAM:  GENERAL: obese adult male in no distress,   HEENT:  pupils equal and reactive, EOMI, no oropharyngeal lesions, erythema, exudates, oral thrush  NECK:   supple, no carotid bruits, no palpable lymph nodes, no thyromegaly  CV:  +S1, +S2, regular, no murmurs or rubs  RESP:   lungs clear to auscultation bilaterally, no wheezing, rales, rhonchi, good air entry bilaterally  GI:  abdomen soft, non-tender, non-distended, normal BS, no bruits, no palpable masses. reducible umbilical hernia   MSK:   normal muscle tone, no atrophy, no rigidity, no contractions  EXT:  no clubbing, no cyanosis, no edema, no calf pain, swelling or erythema  VASCULAR:  pulses equal and symmetric in the upper and lower extremities  NEURO:  AAOX3, no focal neurological deficits, follows all commands, able to move extremities spontaneously  SKIN:  no ulcers, lesions or rashes

## 2022-04-15 NOTE — H&P ADULT - ASSESSMENT
52 y/o male w/ pmhx of HTN, umbilical hernia, GERD, chronic back pain, sleep apnea, RA, lyme disease, chronic liver failure on Xifaxin,  presenting with abd distension bilat leg edema and intermittent AMS for past several days.     admitted for increased ammonia level and concerns for hepatic malignancy     # acute toxic metabolic encephalopathy due to chronic liver failure   - ammonia 63   - CT scan with liver surface nodularity, and steatosis without ascites, suggestion of the subtle peritoneal haziness,Vague bilateral lung ground glass opacities. Few scattered bilateral subpleural based nodules with representative left lower lobe nodule measuring 1.2 cm. Consider nonemergent pulmonary imaging follow-up to exclude hepatic metastasis.  - MR abd pending to eval for possible metastatic disease   - cont w/ xifaxin and start on lactulose   - cont w/ spirinolactone   - neuro checks Q shift   - GI consult w- follows out pt Dr De La Vega   - ammonia level & lipid panel in AM   - Blood culture and     # LESLI   - previous renal function reviewed without significant decrease in gfr   - would hold further IV fluids for now   - monitor and trend renal function, avoid nephrotoxin   - renal consult pending   - NS infusion at 75ml     # hx of HTN, GERD, chronic back pain, sleep apnea, RA    - cont w/ metoprolol w/ hold parameters   - oxycodone 15mg, trazadone, seroquel, lexapro and ambien PRN       above plan dicussed with Dr. Ernst, consultants, RN & patient  52 y/o male w/ pmhx of HTN, umbilical hernia, GERD, chronic back pain, sleep apnea, RA, lyme disease, chronic liver failure on Xifaxin,  presenting with abd distension bilat leg edema and intermittent AMS for past several days.     admitted for increased ammonia level and concerns for hepatic malignancy     # acute toxic metabolic encephalopathy due to chronic liver disease  - ammonia 63   - CT scan with liver surface nodularity, and steatosis without ascites, suggestion of the subtle peritoneal haziness,Vague bilateral lung ground glass opacities. Few scattered bilateral subpleural based nodules with representative left lower lobe nodule measuring 1.2 cm. Consider nonemergent pulmonary imaging follow-up to exclude hepatic metastasis.  - MR abd pending to eval for possible metastatic disease   - cont w/ xifaxin and start on lactulose   - cont w/ spirinolactone   - neuro checks Q shift   - GI consult w- follows out pt Dr De La Vega   - ammonia level & lipid panel in AM   - Blood culture and     # LESLI   - previous renal function reviewed without significant decrease in gfr   - monitor and trend renal function, avoid nephrotoxin   - renal consult pending   - NS infusion at 75ml   -Hold diuretics    # hx of HTN, GERD, chronic back pain, sleep apnea, RA    - cont w/ metoprolol w/ hold parameters   - oxycodone 15mg, trazadone, seroquel, lexapro and ambien PRN     # Pulmonary nodule  Check CT chest    above plan dicussed with Dr. Ernst, consultants, RN & patient  50 y/o male w/ pmhx of HTN, umbilical hernia, GERD, chronic back pain, sleep apnea, RA, lyme disease, chronic liver failure on Xifaxin,  presenting with abd distension bilat leg edema and intermittent AMS for past several days.     admitted for increased ammonia level and concerns for hepatic malignancy     # acute toxic metabolic encephalopathy due to chronic liver disease  - ammonia 63   - CT scan with liver surface nodularity, and steatosis without ascites, suggestion of the subtle peritoneal haziness,Vague bilateral lung ground glass opacities. Few scattered bilateral subpleural based nodules with representative left lower lobe nodule measuring 1.2 cm. Consider nonemergent pulmonary imaging follow-up to exclude hepatic metastasis.  - MR abd pending to eval for possible metastatic disease   - cont w/ xifaxin and start on lactulose   - cont w/ spirinolactone   - neuro checks Q shift   - GI consult w- follows out pt Dr eD La Vega   - ammonia level & lipid panel in AM   - Blood culture and urine culture pending     # acute etoh abuse   - no signs of withdrawal at this time  - pt denies daily use, wife at bedside concerns of downplaying etoh   - start on low ciwa protocol   - mvt folic acid and b12     # LESLI   - previous renal function reviewed without significant decrease in gfr   - monitor and trend renal function, avoid nephrotoxin   - renal consult pending   - NS infusion at 75ml   -Hold diuretics    # macrocytic anemia   - started on b12, folate and mvt     # hx of HTN, GERD, chronic back pain, sleep apnea, RA    - cont w/ metoprolol w/ hold parameters   - oxycodone 15mg, trazadone, seroquel, lexapro and ambien PRN     # Pulmonary nodule  Check CT chest    above plan discussed with Dr. Ernst, consultants, RN & patient  52 y/o male w/ pmhx of HTN, umbilical hernia, GERD, chronic back pain, sleep apnea, RA, lyme disease, chronic liver failure on Xifaxin,  presenting with abd distension bilat leg edema and intermittent AMS for past several days.     admitted for increased ammonia level and concerns for hepatic malignancy     # acute toxic metabolic encephalopathy due to chronic liver disease  - ammonia 63   - CT scan with liver surface nodularity, and steatosis without ascites, suggestion of the subtle peritoneal haziness,Vague bilateral lung ground glass opacities. Few scattered bilateral subpleural based nodules with representative left lower lobe nodule measuring 1.2 cm. Consider nonemergent pulmonary imaging follow-up to exclude hepatic metastasis.  - MR abd pending to eval for possible metastatic disease   - cont w/ xifaxin and start on lactulose   - cont w/ spirinolactone   - neuro checks Q shift   - GI consult w- follows out pt Dr De La Vega   - ammonia level & lipid panel in AM   - Blood culture and urine culture pending     # chronic etoh abuse without active withdrawal   - no signs of withdrawal at this time  - pt denies daily use, wife at bedside concerns of downplaying etoh   - start on low ciwa protocol   - mvt folic acid and b12     # LESLI   - previous renal function reviewed without significant decrease in gfr   - monitor and trend renal function, avoid nephrotoxin   - renal consult pending   - NS infusion at 75ml   -Hold diuretics    # macrocytic anemia   - started on b12, folate and mvt     # hx of HTN, GERD, chronic back pain, sleep apnea, RA    - cont w/ metoprolol w/ hold parameters   - oxycodone 15mg, trazadone, seroquel, lexapro and ambien PRN     # Pulmonary nodule  Check CT chest    above plan discussed with Dr. Ernst, consultants, RN & patient

## 2022-04-15 NOTE — PATIENT PROFILE ADULT - FALL HARM RISK - UNIVERSAL INTERVENTIONS
Bed in lowest position, wheels locked, appropriate side rails in place/Call bell, personal items and telephone in reach/Instruct patient to call for assistance before getting out of bed or chair/Non-slip footwear when patient is out of bed/[place] to call system/Physically safe environment - no spills, clutter or unnecessary equipment/Purposeful Proactive Rounding/Room/bathroom lighting operational, light cord in reach

## 2022-04-15 NOTE — CONSULT NOTE ADULT - SUBJECTIVE AND OBJECTIVE BOX
NEPHROLOGY CONSULT  HPI:  50 y/o male w/ pmhx of HTN, umbilical hernia, GERD, chronic back pain, sleep apnea, RA, lyme disease, chronic liver failure on Xifaxin,  presenting with abd distension bilat leg edema and intermittent AMS for past several days.     CT abd-  Suggestion of subtle peritoneal haziness along  with subtle nodularities. There are collateral vessels identified within the abdomen, limiting detailed evaluation. Consider nonemergent correlation with PET/CT to exclude peritoneal carcinomatosis or metastatic disease.  Ammonia level elevated at 63 and creatinine 3.08     MELD score 19  (15 Apr 2022 09:43)  Above from Chart:      PAST MEDICAL & SURGICAL HISTORY:  Lyme disease    RA (rheumatoid arthritis)    Sleep apnea, unspecified type    Chronic back pain    GERD (gastroesophageal reflux disease)    Umbilical hernia    HTN (hypertension)    History of total right knee replacement    H/O spinal fusion  2014        FAMILY HISTORY:  Family history of hypertension  parent        MEDICATIONS  (STANDING):  escitalopram 20 milliGRAM(s) Oral daily  fluticasone propionate 50 MICROgram(s)/spray Nasal Spray 1 Spray(s) Both Nostrils two times a day  furosemide    Tablet 40 milliGRAM(s) Oral at bedtime  metoprolol succinate ER 50 milliGRAM(s) Oral daily  QUEtiapine 50 milliGRAM(s) Oral at bedtime  rifAXIMin 550 milliGRAM(s) Oral two times a day  senna 2 Tablet(s) Oral at bedtime  sodium chloride 0.9%. 1000 milliLiter(s) (75 mL/Hr) IV Continuous <Continuous>  spironolactone 50 milliGRAM(s) Oral daily  traZODone 150 milliGRAM(s) Oral at bedtime    MEDICATIONS  (PRN):  aluminum hydroxide/magnesium hydroxide/simethicone Suspension 30 milliLiter(s) Oral every 4 hours PRN Dyspepsia  ondansetron Injectable 4 milliGRAM(s) IV Push every 6 hours PRN Nausea  oxyCODONE    IR 15 milliGRAM(s) Oral every 6 hours PRN Severe Pain (7 - 10)  zolpidem 5 milliGRAM(s) Oral at bedtime PRN Insomnia  zolpidem 5 milliGRAM(s) Oral at bedtime PRN Insomnia      Allergies    penicillins (Unknown)    Intolerances        I&O's Summary    15 Apr 2022 07:01  -  15 Apr 2022 14:55  --------------------------------------------------------  IN: 0 mL / OUT: 1000 mL / NET: -1000 mL          REVIEW OF SYSTEMS:    CONSTITUTIONAL:  As per HPI.  CONSTITUTIONAL: No weakness, fevers or chills  EYES/ENT: No visual changes;  No vertigo or throat pain   NECK: No pain or stiffness  CARDIOVASCULAR: No chest pain or palpitations  GASTROINTESTINAL: No abdominal or epigastric pain. No nausea, vomiting, or hematemesis; No diarrhea or constipation. No melena or hematochezia.  GENITOURINARY: No dysuria, frequency or hematuria  NEUROLOGICAL: No numbness or weakness  SKIN: No itching, burning, rashes, or lesions   All other review of systems is negative unless indicated above      Vital Signs Last 24 Hrs  T(C): 36.4 (15 Apr 2022 08:13), Max: 37.1 (14 Apr 2022 18:28)  T(F): 97.6 (15 Apr 2022 08:13), Max: 98.8 (14 Apr 2022 18:28)  HR: 74 (15 Apr 2022 08:13) (74 - 81)  BP: 133/63 (15 Apr 2022 08:13) (109/74 - 150/63)  BP(mean): --  RR: 17 (15 Apr 2022 08:13) (16 - 18)  SpO2: 94% (15 Apr 2022 08:13) (92% - 99%)    Daily Height in cm: 182.88 (14 Apr 2022 18:28)    Daily     I&O's Summary    15 Apr 2022 07:01  -  15 Apr 2022 14:55  --------------------------------------------------------  IN: 0 mL / OUT: 1000 mL / NET: -1000 mL        PHYSICAL EXAM:    General:  Alert, No acute distress.    Neuro:  Alert and oriented to person, place, and time. Able to communicate  well.      HEENT:  No JVD, no masses, Eyes anicteric, ,    Cardiovascular:  Regular rate and rhythm, with normal S1 and S2.    Lungs:  clear. no rales, no wheezing, .    Abdomen:  Normoactive bowel sounds. Soft, flat, non-tender, and non-distended.  positive bowel sounds    Skin:  Warm, dry    Extremities:  warm,  no cyanosis    LABS:                                   12.8   5.77  )-----------( 76       ( 15 Apr 2022 09:06 )             38.5                         12.6   7.65  )-----------( 89       ( 14 Apr 2022 18:17 )             37.6           137    |  106    |  41     ----------------------------<  150       15 Apr 2022 09:06  4.8     |  25     |  2.31     133    |  105    |  50     ----------------------------<  125       14 Apr 2022 18:17  5.2     |  21     |  3.08     Ca    9.7        15 Apr 2022 09:06  Ca    9.4        14 Apr 2022 18:17         NEPHROLOGY CONSULT  HPI:  50 y/o male w/ pmhx of HTN, umbilical hernia, GERD, chronic back pain, sleep apnea, RA, lyme disease, chronic liver failure on Xifaxin,  presenting with abd distension bilat leg edema and intermittent AMS for past several days.     CT abd-  Suggestion of subtle peritoneal haziness along  with subtle nodularities. There are collateral vessels identified within the abdomen, limiting detailed evaluation. Consider nonemergent correlation with PET/CT to exclude peritoneal carcinomatosis or metastatic disease.  Ammonia level elevated at 63 and creatinine 3.08     MELD score 19  (15 Apr 2022 09:43)  Above from Chart:  Pts wife at bedside  Stating pt was drinking a pint a day, and recently reduced to 1-2 drinks per day  Pt states he stopped  Admitted w LESLI on Lasix   Creat improving  pt making good amnt of urine   MRI abd no malignancy      PAST MEDICAL & SURGICAL HISTORY:  Lyme disease    RA (rheumatoid arthritis)    Sleep apnea, unspecified type    Chronic back pain    GERD (gastroesophageal reflux disease)    Umbilical hernia    HTN (hypertension)    History of total right knee replacement    H/O spinal fusion  2014        FAMILY HISTORY:  Family history of hypertension  parent        MEDICATIONS  (STANDING):  escitalopram 20 milliGRAM(s) Oral daily  fluticasone propionate 50 MICROgram(s)/spray Nasal Spray 1 Spray(s) Both Nostrils two times a day  folic acid 1 milliGRAM(s) Oral daily  furosemide    Tablet 40 milliGRAM(s) Oral at bedtime  lactulose Syrup 10 Gram(s) Oral three times a day  metoprolol succinate ER 50 milliGRAM(s) Oral daily  multivitamin 1 Tablet(s) Oral daily  QUEtiapine 50 milliGRAM(s) Oral at bedtime  rifAXIMin 550 milliGRAM(s) Oral two times a day  senna 2 Tablet(s) Oral at bedtime  sodium chloride 0.9%. 1000 milliLiter(s) (75 mL/Hr) IV Continuous <Continuous>  spironolactone 50 milliGRAM(s) Oral daily  traZODone 150 milliGRAM(s) Oral at bedtime    MEDICATIONS  (PRN):  aluminum hydroxide/magnesium hydroxide/simethicone Suspension 30 milliLiter(s) Oral every 4 hours PRN Dyspepsia  LORazepam     Tablet 2 milliGRAM(s) Oral every 2 hours PRN Symptom-triggered 2 point increase in CIWA-Ar  LORazepam   Injectable 2 milliGRAM(s) IV Push every 2 hours PRN CIWA-Ar score increase by 2 points and a total score of 7 or less  LORazepam   Injectable 2 milliGRAM(s) IV Push every 1 hour PRN CIWA-Ar score 8 or greater  ondansetron Injectable 4 milliGRAM(s) IV Push every 6 hours PRN Nausea  oxyCODONE    IR 15 milliGRAM(s) Oral every 6 hours PRN Severe Pain (7 - 10)  zolpidem 5 milliGRAM(s) Oral at bedtime PRN Insomnia  zolpidem 5 milliGRAM(s) Oral at bedtime PRN Insomnia        Allergies    penicillins (Unknown)    Intolerances        I&O's Summary    15 Apr 2022 07:01  -  15 Apr 2022 14:55  --------------------------------------------------------  IN: 0 mL / OUT: 1000 mL / NET: -1000 mL          REVIEW OF SYSTEMS:    CONSTITUTIONAL:  As per HPI.  CONSTITUTIONAL: No weakness, fevers or chills  EYES/ENT: No visual changes;  No vertigo or throat pain   NECK: No pain or stiffness  CARDIOVASCULAR: No chest pain or palpitations  GASTROINTESTINAL + abd distention, weight gain  GENITOURINARY: No dysuria, frequency or hematuria  NEUROLOGICAL: No numbness or weakness  SKIN: No itching, burning, rashes, or lesions   All other review of systems is negative unless indicated above      Vital Signs Last 24 Hrs  T(C): 36.4 (15 Apr 2022 08:13), Max: 37.1 (14 Apr 2022 18:28)  T(F): 97.6 (15 Apr 2022 08:13), Max: 98.8 (14 Apr 2022 18:28)  HR: 74 (15 Apr 2022 08:13) (74 - 81)  BP: 133/63 (15 Apr 2022 08:13) (109/74 - 150/63)  BP(mean): --  RR: 17 (15 Apr 2022 08:13) (16 - 18)  SpO2: 94% (15 Apr 2022 08:13) (92% - 99%)    Daily Height in cm: 182.88 (14 Apr 2022 18:28)    Daily     I&O's Summary    15 Apr 2022 07:01  -  15 Apr 2022 14:55  --------------------------------------------------------  IN: 0 mL / OUT: 1000 mL / NET: -1000 mL        PHYSICAL EXAM:    General:  Alert, No acute distress.  mild confusion, agitated, confrontational     Neuro:  Alert and oriented to person, place, and time with  some difficulty    HEENT:  No JVD, no masses, Eyes anicteric, ,    Cardiovascular:  Regular rate and rhythm, with normal S1 and S2.    Lungs:  clear. no rales, no wheezing, .    Abdomen:  Normoactive bowel sounds. Soft, flat, non-tender, softly distended.  positive bowel sounds    Skin:  Warm, dry    Extremities:  warm,  no cyanosis    LABS:                                   12.8   5.77  )-----------( 76       ( 15 Apr 2022 09:06 )             38.5                         12.6   7.65  )-----------( 89       ( 14 Apr 2022 18:17 )             37.6     04-15    137  |  105  |  36<H>  ----------------------------<  151<H>  5.5<H>   |  23  |  2.03<H>    Ca    9.6      15 Apr 2022 20:55  Phos  3.9     04-15  Mg     2.3     04-15    TPro  8.2  /  Alb  3.7  /  TBili  1.3<H>  /  DBili  x   /  AST  48<H>  /  ALT  43  /  AlkPhos  99  04-15        137    |  106    |  41     ----------------------------<  150       15 Apr 2022 09:06  4.8     |  25     |  2.31     133    |  105    |  50     ----------------------------<  125       14 Apr 2022 18:17  5.2     |  21     |  3.08     Ca    9.7        15 Apr 2022 09:06  Ca    9.4        14 Apr 2022 18:17

## 2022-04-15 NOTE — CONSULT NOTE ADULT - SUBJECTIVE AND OBJECTIVE BOX
GI consult    HPI:  52 y/o male w/ pmhx of HTN, umbilical hernia, GERD, chronic back pain, sleep apnea, RA, lyme disease, chronic liver failure on Xifaxin,  presenting with abd distension bilat leg edema and intermittent AMS for past several days.     CT abd-  Suggestion of subtle peritoneal haziness along  with subtle nodularities. There are collateral vessels identified within the abdomen, limiting detailed evaluation. Consider nonemergent correlation with PET/CT to exclude peritoneal carcinomatosis or metastatic disease.  Ammonia level elevated at 63 and creatinine 3.08     MELD score 19  (15 Apr 2022 09:43)    Pt known to me from office-he recently came in for many months of abdominal swelling and fatigue. CT done outpt shows hepatosplenomegaly, portal HTN with varices and signs of cirrhosis. Started diuretics and last Cr on 4/1/22 was 1.45 in office. Also given xifaxan and lactulose for mild asterixis. Now admitted with confusion and elev ammonia, also Cr was elevated on admission to 3. Pt adamantly denies recent EtOH use and states he completely stopped after finding cirrhosis on outpt CT 3/24/22. Per staff, pt's wife told them he is drinking vodka every day.      PAST MEDICAL & SURGICAL HISTORY:  Lyme disease    RA (rheumatoid arthritis)    Sleep apnea, unspecified type    Chronic back pain    GERD (gastroesophageal reflux disease)    Umbilical hernia    HTN (hypertension)    History of total right knee replacement    H/O spinal fusion  2014        Home Medications:  Ambien 10 mg oral tablet: 1 tab(s) orally once a day (at bedtime), As Needed (15 Apr 2022 00:38)  cefpodoxime 200 mg oral tablet: 1 tab(s) orally every 12 hours for 14 days  ***course not complete*** (15 Apr 2022 00:38)  clonazePAM 0.5 mg oral tablet: 1 tab(s) orally once a day, As Needed (15 Apr 2022 00:38)  Concerta 18 mg/24 hr oral tablet, extended release: 1 tab(s) orally once a day (in the morning) (15 Apr 2022 00:31)  escitalopram 20 mg oral tablet: 1 tab(s) orally once a day (15 Apr 2022 00:31)  Flonase 50 mcg/inh nasal spray: 1 spray(s) nasal once a day (15 Apr 2022 00:38)  furosemide 40 mg oral tablet: 1 tab(s) orally once a day (at bedtime) (15 Apr 2022 00:38)  losartan 100 mg oral tablet: 1 tab(s) orally once a day (15 Apr 2022 00:31)  metoprolol succinate 50 mg oral tablet, extended release: 1 tab(s) orally once a day (15 Apr 2022 00:31)  oxyCODONE 15 mg oral tablet: 1 tab(s) orally every 6 hours (15 Apr 2022 00:31)  QUEtiapine 50 mg oral tablet: 1 tab(s) orally once a day (at bedtime) (15 Apr 2022 00:38)  spironolactone 50 mg oral tablet: 1 tab(s) orally once a day (at bedtime) (15 Apr 2022 00:38)  traZODone 150 mg oral tablet: 1 tab(s) orally once a day (at bedtime) (15 Apr 2022 00:38)  Xifaxan 550 mg oral tablet: 1 tab(s) orally 2 times a day (15 Apr 2022 00:38)      MEDICATIONS  (STANDING):  escitalopram 20 milliGRAM(s) Oral daily  fluticasone propionate 50 MICROgram(s)/spray Nasal Spray 1 Spray(s) Both Nostrils two times a day  folic acid 1 milliGRAM(s) Oral daily  furosemide    Tablet 40 milliGRAM(s) Oral at bedtime  lactulose Syrup 10 Gram(s) Oral three times a day  metoprolol succinate ER 50 milliGRAM(s) Oral daily  multivitamin 1 Tablet(s) Oral daily  QUEtiapine 50 milliGRAM(s) Oral at bedtime  rifAXIMin 550 milliGRAM(s) Oral two times a day  senna 2 Tablet(s) Oral at bedtime  sodium chloride 0.9%. 1000 milliLiter(s) (75 mL/Hr) IV Continuous <Continuous>  spironolactone 50 milliGRAM(s) Oral daily  traZODone 150 milliGRAM(s) Oral at bedtime    MEDICATIONS  (PRN):  aluminum hydroxide/magnesium hydroxide/simethicone Suspension 30 milliLiter(s) Oral every 4 hours PRN Dyspepsia  LORazepam     Tablet 2 milliGRAM(s) Oral every 2 hours PRN Symptom-triggered 2 point increase in CIWA-Ar  LORazepam   Injectable 2 milliGRAM(s) IV Push every 2 hours PRN CIWA-Ar score increase by 2 points and a total score of 7 or less  LORazepam   Injectable 2 milliGRAM(s) IV Push every 1 hour PRN CIWA-Ar score 8 or greater  ondansetron Injectable 4 milliGRAM(s) IV Push every 6 hours PRN Nausea  oxyCODONE    IR 15 milliGRAM(s) Oral every 6 hours PRN Severe Pain (7 - 10)  zolpidem 5 milliGRAM(s) Oral at bedtime PRN Insomnia  zolpidem 5 milliGRAM(s) Oral at bedtime PRN Insomnia      Allergies    penicillins (Unknown)    Intolerances        SOCIAL HISTORY: +EtOH, no tob    FAMILY HISTORY:  Family history of hypertension  parent        ROS  As above  Otherwise unremarkable, all systems reviewed    PE:  Vital Signs Last 24 Hrs  T(C): 36.2 (15 Apr 2022 20:00), Max: 36.8 (15 Apr 2022 02:02)  T(F): 97.2 (15 Apr 2022 20:00), Max: 98.3 (15 Apr 2022 02:02)  HR: 83 (15 Apr 2022 20:00) (74 - 83)  BP: 122/60 (15 Apr 2022 20:00) (113/60 - 150/63)  BP(mean): --  RR: 18 (15 Apr 2022 20:00) (16 - 18)  SpO2: 95% (15 Apr 2022 20:00) (94% - 99%)    Constitutional: NAD, obese, A+Ox3  Anicteric   Respiratory: CTABL, breathing comfortably  Cardiovascular: S1 and S2, RRR  Gastrointestinal: +BS, soft, non tender, non distended, no mass  Extremities: warm, well perfused, trace edema  Psychiatric: mild slowing  Neuro: moves all extremities, grossly intact  Skin: No rashes or lesions    LABS:                        12.8   5.77  )-----------( 76       ( 15 Apr 2022 09:06 )             38.5     04-15    137  |  106  |  41<H>  ----------------------------<  150<H>  4.8   |  25  |  2.31<H>    Ca    9.7      15 Apr 2022 09:06    TPro  8.2  /  Alb  3.7  /  TBili  1.3<H>  /  DBili  x   /  AST  48<H>  /  ALT  43  /  AlkPhos  99  04-15    PT/INR - ( 15 Apr 2022 09:06 )   PT: 14.8 sec;   INR: 1.27 ratio           RADIOLOGY & ADDITIONAL STUDIES:    ACC: [id number] EXAM:  CT ABDOMEN AND PELVIS                          PROCEDURE DATE:  04/14/2022          INTERPRETATION:  CLINICAL INFORMATION: Abdominal pain.    COMPARISON: CT abdomen pelvis 8/22/2020.    PROCEDURE:  CT of the Abdomen and Pelvis was performed without intravenous contrast.  Intravenous contrast: None.  Oral contrast: None.  Sagittal and coronal reformats were performed.    FINDINGS: Absence of intravenous contrast imaged evaluation of the solid   organs and vasculature.    LOWER CHEST: Vague bilateral groundglass opacities. Few scattered   bilateral subpleural based nodules with representative left lower lobe   nodule measuring 1.2 cm on image 24 series 2. Consider nonemergent   pulmonary imaging follow-up to exclude hepatic metastasis.    LIVER: Suggestion of liver surface nodularity and steatosis.  BILE DUCTS: Normal caliber.  GALLBLADDER: Cholelithiasis.  SPLEEN: Too small to characterize hypodensities.  PANCREAS: Within normal limits.  ADRENALS: Within normal limits.  KIDNEYS/URETERS: Within normal limits.    BLADDER: Within normal limits.  REPRODUCTIVE ORGANS: The prostate is within normal limits.    BOWEL: No bowel obstruction. Normal appendix.  PERITONEUM: No ascites. Suggestion of subtle peritoneal haziness along   with subtle nodularities. There are collateral vessels identified within   the abdomen, limiting detailed evaluation. Consider nonemergent   correlation with PET/CT to exclude peritoneal carcinomatosis or   metastatic disease.  VESSELS:  Atherosclerotic disease of the aorta and branches.  RETROPERITONEUM: No lymphadenopathy.  ABDOMINAL WALL: Postsurgical changes of anterior abdominal wall hernia   repair containing mesh. A small fat containing bilateral groin hernia is   noted.  BONES: Multilevel degenerative changes of the spine. Discectomy with   posterior metallic fusion of L4 on L5, streak artifact limits detailed   evaluation of the spinal canal adjacent structures.    IMPRESSION:    Cholelithiasis.    Suggestion of liver surface nodularity and steatosis. No ascites.    Suggestion of subtle peritoneal haziness along with subtle nodularities.   Collateral vessels identified within the abdomen, limiting detailed   evaluation. Consider nonemergent correlation with PET/CT to exclude   peritoneal carcinomatosis or metastatic disease.    Vague bilateral lung groundglass opacities. Few scattered bilateral   subpleural based nodules with representative left lower lobe nodule   measuring 1.2 cm. Consider nonemergent pulmonary imaging follow-up to   exclude hepatic metastasis.    --- End of Report ---            BOB YU MD; Attending Radiologist  This document has been electronically signed. Apr 14 2022 10:52PM      ACC: [id number] EXAM:  MR ABDOMEN                          PROCEDURE DATE:  04/15/2022          INTERPRETATION:  CLINICAL INFORMATION: Acute mental status changes and   abdominal bloating.    COMPARISON: CT dated April 14, 2022    CONTRAST/COMPLICATIONS:  IV Contrast: NONE  Oral Contrast: NONE  Complications: None reported at time of study completion    PROCEDURE:  MRI of the abdomen was performed.  MRCP was performed.    FINDINGS:  LOWER CHEST: Within normal limits.    LIVER: Within normal limits.  BILE DUCTS: Normal caliber.  GALLBLADDER: Small dependent stones.  SPLEEN: Within normal limits.  PANCREAS: Within normal limits.  ADRENALS: Within normal limits.  KIDNEYS/URETERS: Within normal limits.    VISUALIZED PORTIONS:  BOWEL: Within normal limits.  PERITONEUM: There is thickening of the peritoneum and prominence of the   peritoneal vessels along the RIGHT paracolic cutter. The appearance is   very similar to the study of April 14, 2022 as well as a study of August 22, 2020.  VESSELS: Within normal limits.  RETROPERITONEUM/LYMPH NODES: No lymphadenopathy.  ABDOMINAL WALL: Partially obscured by field of view.  BONES: Metallic artifact from pedicle screws.    IMPRESSION:  No evidence of metastatic disease.  Persistent peritoneal haziness and fat stranding along the posterior   RIGHT ileocolic gutter. The appearance is unchanged from CT dated April 14, 2022 and only slightly more prominent than was seen on August 22, 2020. The findings are nonspecific.  Gallstones.        --- End of Report ---            SELAM ALDRIDGE MD; Attending Radiologist  This document has been electronically signed. Apr 15 2022  5:00PM

## 2022-04-16 LAB
ALBUMIN SERPL ELPH-MCNC: 3.5 G/DL — SIGNIFICANT CHANGE UP (ref 3.3–5)
ALP SERPL-CCNC: 95 U/L — SIGNIFICANT CHANGE UP (ref 40–120)
ALT FLD-CCNC: 48 U/L — SIGNIFICANT CHANGE UP (ref 12–78)
ANION GAP SERPL CALC-SCNC: 4 MMOL/L — LOW (ref 5–17)
AST SERPL-CCNC: 54 U/L — HIGH (ref 15–37)
BILIRUB DIRECT SERPL-MCNC: 0.4 MG/DL — HIGH (ref 0–0.3)
BILIRUB INDIRECT FLD-MCNC: 1 MG/DL — SIGNIFICANT CHANGE UP (ref 0.2–1)
BILIRUB SERPL-MCNC: 1.4 MG/DL — HIGH (ref 0.2–1.2)
BUN SERPL-MCNC: 39 MG/DL — HIGH (ref 7–23)
CALCIUM SERPL-MCNC: 10 MG/DL — SIGNIFICANT CHANGE UP (ref 8.5–10.1)
CHLORIDE SERPL-SCNC: 104 MMOL/L — SIGNIFICANT CHANGE UP (ref 96–108)
CO2 SERPL-SCNC: 27 MMOL/L — SIGNIFICANT CHANGE UP (ref 22–31)
CREAT SERPL-MCNC: 2.19 MG/DL — HIGH (ref 0.5–1.3)
CULTURE RESULTS: SIGNIFICANT CHANGE UP
EGFR: 35 ML/MIN/1.73M2 — LOW
FERRITIN SERPL-MCNC: 382 NG/ML — SIGNIFICANT CHANGE UP (ref 30–400)
GLUCOSE SERPL-MCNC: 138 MG/DL — HIGH (ref 70–99)
OSMOLALITY SERPL: 307 MOSMOL/KG — HIGH (ref 275–300)
OSMOLALITY UR: 396 MOSM/KG — SIGNIFICANT CHANGE UP (ref 50–1200)
POTASSIUM SERPL-MCNC: 5.5 MMOL/L — HIGH (ref 3.5–5.3)
POTASSIUM SERPL-SCNC: 5.5 MMOL/L — HIGH (ref 3.5–5.3)
PROT SERPL-MCNC: 8 GM/DL — SIGNIFICANT CHANGE UP (ref 6–8.3)
SODIUM SERPL-SCNC: 135 MMOL/L — SIGNIFICANT CHANGE UP (ref 135–145)
SPECIMEN SOURCE: SIGNIFICANT CHANGE UP

## 2022-04-16 PROCEDURE — 99233 SBSQ HOSP IP/OBS HIGH 50: CPT

## 2022-04-16 RX ORDER — LACTULOSE 10 G/15ML
20 SOLUTION ORAL THREE TIMES A DAY
Refills: 0 | Status: DISCONTINUED | OUTPATIENT
Start: 2022-04-16 | End: 2022-04-18

## 2022-04-16 RX ORDER — TRAZODONE HCL 50 MG
50 TABLET ORAL AT BEDTIME
Refills: 0 | Status: DISCONTINUED | OUTPATIENT
Start: 2022-04-16 | End: 2022-04-20

## 2022-04-16 RX ORDER — SODIUM ZIRCONIUM CYCLOSILICATE 10 G/10G
10 POWDER, FOR SUSPENSION ORAL DAILY
Refills: 0 | Status: DISCONTINUED | OUTPATIENT
Start: 2022-04-16 | End: 2022-04-17

## 2022-04-16 RX ADMIN — LACTULOSE 20 GRAM(S): 10 SOLUTION ORAL at 21:51

## 2022-04-16 RX ADMIN — Medication 1 SPRAY(S): at 09:42

## 2022-04-16 RX ADMIN — OXYCODONE HYDROCHLORIDE 15 MILLIGRAM(S): 5 TABLET ORAL at 00:23

## 2022-04-16 RX ADMIN — ESCITALOPRAM OXALATE 20 MILLIGRAM(S): 10 TABLET, FILM COATED ORAL at 09:42

## 2022-04-16 RX ADMIN — SODIUM ZIRCONIUM CYCLOSILICATE 10 GRAM(S): 10 POWDER, FOR SUSPENSION ORAL at 00:06

## 2022-04-16 RX ADMIN — OXYCODONE HYDROCHLORIDE 15 MILLIGRAM(S): 5 TABLET ORAL at 10:09

## 2022-04-16 RX ADMIN — Medication 50 MILLIGRAM(S): at 09:41

## 2022-04-16 RX ADMIN — Medication 1 MILLIGRAM(S): at 09:42

## 2022-04-16 RX ADMIN — OXYCODONE HYDROCHLORIDE 15 MILLIGRAM(S): 5 TABLET ORAL at 17:07

## 2022-04-16 RX ADMIN — SENNA PLUS 2 TABLET(S): 8.6 TABLET ORAL at 21:50

## 2022-04-16 RX ADMIN — Medication 50 MILLIGRAM(S): at 21:50

## 2022-04-16 RX ADMIN — Medication 40 MILLIGRAM(S): at 21:50

## 2022-04-16 RX ADMIN — Medication 1 TABLET(S): at 09:41

## 2022-04-16 NOTE — PROGRESS NOTE ADULT - SUBJECTIVE AND OBJECTIVE BOX
CC: Confusion  History of Present Illness:   52 y/o male w/ pmhx of HTN, umbilical hernia, GERD, chronic back pain, sleep apnea, RA, lyme disease, chronic liver failure on Xifaxin,  presenting with abd distension bilat leg edema and intermittent AMS for past several days.     S:  : Seen at bedside, prior to eating his lunch, pt sitting up, alert, answering questions appropriately.  Offers no complaints. Updated wife on plan of care.    REVIEW OF SYSTEMS: All other review of systems is negative unless indicated above.    Allergies and Intolerances:      Physical Exam:   Vital Signs Last 24 Hrs  T(C): 36.4 (2022 08:02), Max: 36.8 (2022 00:00)  T(F): 97.5 (2022 08:02), Max: 98.2 (2022 00:00)  HR: 64 (2022 08:02) (64 - 83)  BP: 139/69 (2022 08:02) (113/60 - 139/69)  BP(mean): --  RR: 18 (2022 08:02) (17 - 18)  SpO2: 94% (2022 08:02) (93% - 96%)      PHYSICAL EXAM:  GENERAL: obese adult male in no distress,   HEENT:  pupils equal and reactive, EOMI, no oropharyngeal lesions, erythema, exudates, oral thrush  NECK:   supple, no carotid bruits, no palpable lymph nodes, no thyromegaly  CV:  +S1, +S2, regular, no murmurs or rubs  RESP:   lungs clear to auscultation bilaterally, no wheezing, rales, rhonchi, good air entry bilaterally  GI:  abdomen soft, non-tender, non-distended, normal BS, no bruits, no palpable masses. reducible umbilical hernia   MSK:   normal muscle tone, no atrophy, no rigidity, no contractions  EXT:  no clubbing, no cyanosis, no edema, no calf pain, swelling or erythema  VASCULAR:  pulses equal and symmetric in the upper and lower extremities  NEURO:  AAOX3, no focal neurological deficits, follows all commands, able to move extremities spontaneously  SKIN:  no ulcers, lesions or rashes    MEDICATIONS  (STANDING):  escitalopram 20 milliGRAM(s) Oral daily  fluticasone propionate 50 MICROgram(s)/spray Nasal Spray 1 Spray(s) Both Nostrils two times a day  folic acid 1 milliGRAM(s) Oral daily  furosemide    Tablet 40 milliGRAM(s) Oral at bedtime  lactulose Syrup 20 Gram(s) Oral three times a day  metoprolol succinate ER 50 milliGRAM(s) Oral daily  multivitamin 1 Tablet(s) Oral daily  QUEtiapine 50 milliGRAM(s) Oral at bedtime  rifAXIMin 550 milliGRAM(s) Oral two times a day  senna 2 Tablet(s) Oral at bedtime  traZODone 150 milliGRAM(s) Oral at bedtime    MEDICATIONS  (PRN):  aluminum hydroxide/magnesium hydroxide/simethicone Suspension 30 milliLiter(s) Oral every 4 hours PRN Dyspepsia  LORazepam     Tablet 2 milliGRAM(s) Oral every 2 hours PRN Symptom-triggered 2 point increase in CIWA-Ar  LORazepam   Injectable 2 milliGRAM(s) IV Push every 2 hours PRN CIWA-Ar score increase by 2 points and a total score of 7 or less  LORazepam   Injectable 2 milliGRAM(s) IV Push every 1 hour PRN CIWA-Ar score 8 or greater  ondansetron Injectable 4 milliGRAM(s) IV Push every 6 hours PRN Nausea  oxyCODONE    IR 15 milliGRAM(s) Oral every 6 hours PRN Severe Pain (7 - 10)  zolpidem 5 milliGRAM(s) Oral at bedtime PRN Insomnia  zolpidem 5 milliGRAM(s) Oral at bedtime PRN Insomnia                                12.8   5.77  )-----------( 76       ( 15 Apr 2022 09:06 )             38.5     04-15    137  |  105  |  36<H>  ----------------------------<  151<H>  5.5<H>   |  23  |  2.03<H>    Ca    9.6      15 Apr 2022 20:55  Phos  3.9     04-15  Mg     2.3     -15    TPro  8.0  /  Alb  3.5  /  TBili  1.4<H>  /  DBili  0.4<H>  /  AST  54<H>  /  ALT  48  /  AlkPhos  95  04-16    CAPILLARY BLOOD GLUCOSE        LIVER FUNCTIONS - ( 2022 09:17 )  Alb: 3.5 g/dL / Pro: 8.0 gm/dL / ALK PHOS: 95 U/L / ALT: 48 U/L / AST: 54 U/L / GGT: x           PT/INR - ( 15 Apr 2022 09:06 )   PT: 14.8 sec;   INR: 1.27 ratio         PTT - ( 2022 18:17 )  PTT:37.1 sec  Urinalysis Basic - ( 15 Apr 2022 16:10 )    Color: Yellow / Appearance: Clear / S.010 / pH: x  Gluc: x / Ketone: Negative  / Bili: Negative / Urobili: Negative   Blood: x / Protein: Negative / Nitrite: Negative   Leuk Esterase: Negative / RBC: x / WBC x   Sq Epi: x / Non Sq Epi: x / Bacteria: x          Assessment and Plan:  52 y/o male w/ pmhx of HTN, umbilical hernia, GERD, chronic back pain, sleep apnea, RA, lyme disease, chronic liver failure on Xifaxin,  presenting with abd distension bilat leg edema and intermittent AMS for past several days.         # Acute toxic-Hepatic encephalopathy due to chronic liver disease / hyperammonemia / polypharmacy:   - ammonia 63, f/u levels  - CT hepatic steatosis / nodularity   - MR --> [place]  - cont w/ xifaxin   - lactulose dose adjusted per GI  - aldactone stopped due to hyperkalemia  - stop seroquel, ambien, lower trazadone dose   - GI following  - BCx neg x 2      # chronic etoh abuse without active withdrawal:  - no signs of withdrawal at this time  - PRN wa protocol   - multivitamin     # LESLI / hyperkalemia:  RESOLVING  - Scr trending down 3.08 --> 2.03  - aldactone on hold due to hyperkalemia  - s/p IVFs  - monitor closely on lasix  - nephro following    # macrocytic anemia / thrombocytopenia: Stable  - f/u AM B12, folate levels    # HTN / GERD / chronic back pain / Depression / anxiety / ADHD / sleep apnea / RA:    - cont w/ metoprolol w/ hold parameters   - oxycodone 15mg (has been on this dose chronically)   - decrease trazadone 150mg to 50mg qhs  - Stop seroquel, taper trazadone  - c/w lexapro   - unable to use CPAP due to covid precautions   - d/c ambien    # Pulmonary nodule  - out patient follow up

## 2022-04-16 NOTE — PROGRESS NOTE ADULT - SUBJECTIVE AND OBJECTIVE BOX
GI     HPI:  no major changes  no BM  no n/v  less confused    ROS  As above  Otherwise unremarkable, all systems reviewed    PE:  Vital Signs Last 24 Hrs  T(C): 36.4 (16 Apr 2022 08:02), Max: 36.8 (16 Apr 2022 00:00)  T(F): 97.5 (16 Apr 2022 08:02), Max: 98.2 (16 Apr 2022 00:00)  HR: 64 (16 Apr 2022 08:02) (64 - 83)  BP: 139/69 (16 Apr 2022 08:02) (113/60 - 139/69)  BP(mean): --  RR: 18 (16 Apr 2022 08:02) (17 - 18)  SpO2: 94% (16 Apr 2022 08:02) (93% - 96%)    Constitutional: NAD, obese, A+Ox3  Anicteric   no asterixis  Respiratory: CTABL, breathing comfortably  Cardiovascular: S1 and S2, RRR  Gastrointestinal: +BS, soft, non tender, non distended, no mass  Extremities: warm, well perfused, trace edema  Psychiatric: mild slowing  Neuro: moves all extremities, grossly intact  Skin: No rashes or lesions    LABS:                                          12.8   5.77  )-----------( 76       ( 15 Apr 2022 09:06 )             38.5     04-15    137  |  105  |  36<H>  ----------------------------<  151<H>  5.5<H>   |  23  |  2.03<H>    Ca    9.6      15 Apr 2022 20:55  Phos  3.9     04-15  Mg     2.3     04-15    TPro  8.0  /  Alb  3.5  /  TBili  1.4<H>  /  DBili  0.4<H>  /  AST  54<H>  /  ALT  48  /  AlkPhos  95  04-16

## 2022-04-16 NOTE — PROGRESS NOTE ADULT - ASSESSMENT
51M with progressive liver disease, portal HTN and fluid overload, likely due to alcoholic cirrhosis and steatohepatitis from obesity and diabetes.   Now with hepatic encephalopathy and decreased renal function on diuretics.    Rec:  -diuretics per renal (recent baseline Cr as outpt 1.45)  -monitor Cr  -lactulose BID-TID, titrate to 2-3 loose stools daily (increased today)  -continue Xifaxin  -EtOH cessation  -considering liver biopsy given unclear etiology of liver disease, although history strongly favors EtOH (had full serologic panel as outpatient recently and was negative). Discussed again with patient and will also d/w wife on phone later as his request.  -will follow

## 2022-04-16 NOTE — PROGRESS NOTE ADULT - ASSESSMENT
52 y/o male w/ pmhx of HTN, umbilical hernia, GERD, chronic back pain, sleep apnea, RA, lyme disease, chronic liver failure on Xifaxin,  presenting with abd distension bilat leg edema and intermittent AMS for past several days.     CT abd-  Suggestion of subtle peritoneal haziness along  with subtle nodularities. There are collateral vessels identified within the abdomen, limiting detailed evaluation. Consider nonemergent correlation with PET/CT to exclude peritoneal carcinomatosis or metastatic disease.  Ammonia level elevated at 63 and creatinine 3.08     MELD score 19  (15 Apr 2022 09:43)  Above from Chart:  Pts wife at bedside  Stating pt was drinking a pint a day, and recently reduced to 1-2 drinks per day  Pt states he stopped  Admitted w LESLI on Lasix   Creat improving  pt making good amnt of urine   MRI abd no malignancy    A/P  Cirrhosis, elevated ammonia  LESLI, on po lasix  No ascites no abd malignancy  If UO adequate on PO lasix may cont or switch to po Bumex  Better bioavailability IV ~ PO  dc aldactone for now due to elevated K level  Lokelma x1 20 g    4/16  overall feels well  labs pending   no new events

## 2022-04-16 NOTE — PROGRESS NOTE ADULT - SUBJECTIVE AND OBJECTIVE BOX
NEPHROLOGY CONSULT  HPI:  52 y/o male w/ pmhx of HTN, umbilical hernia, GERD, chronic back pain, sleep apnea, RA, lyme disease, chronic liver failure on Xifaxin,  presenting with abd distension bilat leg edema and intermittent AMS for past several days.     CT abd-  Suggestion of subtle peritoneal haziness along  with subtle nodularities. There are collateral vessels identified within the abdomen, limiting detailed evaluation. Consider nonemergent correlation with PET/CT to exclude peritoneal carcinomatosis or metastatic disease.  Ammonia level elevated at 63 and creatinine 3.08     MELD score 19  (15 Apr 2022 09:43)  Above from Chart:  Pts wife at bedside  Stating pt was drinking a pint a day, and recently reduced to 1-2 drinks per day  Pt states he stopped  Admitted w LESLI on Lasix   Creat improving  pt making good amnt of urine   MRI abd no malignancy    4/16  More awake alert  no new issues  Creat improving   good uo      PAST MEDICAL & SURGICAL HISTORY:  Lyme disease    RA (rheumatoid arthritis)    Sleep apnea, unspecified type    Chronic back pain    GERD (gastroesophageal reflux disease)    Umbilical hernia    HTN (hypertension)    History of total right knee replacement    H/O spinal fusion  2014        FAMILY HISTORY:  Family history of hypertension  parent        MEDICATIONS  (STANDING):  escitalopram 20 milliGRAM(s) Oral daily  fluticasone propionate 50 MICROgram(s)/spray Nasal Spray 1 Spray(s) Both Nostrils two times a day  folic acid 1 milliGRAM(s) Oral daily  furosemide    Tablet 40 milliGRAM(s) Oral at bedtime  lactulose Syrup 10 Gram(s) Oral three times a day  metoprolol succinate ER 50 milliGRAM(s) Oral daily  multivitamin 1 Tablet(s) Oral daily  QUEtiapine 50 milliGRAM(s) Oral at bedtime  rifAXIMin 550 milliGRAM(s) Oral two times a day  senna 2 Tablet(s) Oral at bedtime  sodium chloride 0.9%. 1000 milliLiter(s) (75 mL/Hr) IV Continuous <Continuous>  spironolactone 50 milliGRAM(s) Oral daily  traZODone 150 milliGRAM(s) Oral at bedtime    MEDICATIONS  (PRN):  aluminum hydroxide/magnesium hydroxide/simethicone Suspension 30 milliLiter(s) Oral every 4 hours PRN Dyspepsia  LORazepam     Tablet 2 milliGRAM(s) Oral every 2 hours PRN Symptom-triggered 2 point increase in CIWA-Ar  LORazepam   Injectable 2 milliGRAM(s) IV Push every 2 hours PRN CIWA-Ar score increase by 2 points and a total score of 7 or less  LORazepam   Injectable 2 milliGRAM(s) IV Push every 1 hour PRN CIWA-Ar score 8 or greater  ondansetron Injectable 4 milliGRAM(s) IV Push every 6 hours PRN Nausea  oxyCODONE    IR 15 milliGRAM(s) Oral every 6 hours PRN Severe Pain (7 - 10)  zolpidem 5 milliGRAM(s) Oral at bedtime PRN Insomnia  zolpidem 5 milliGRAM(s) Oral at bedtime PRN Insomnia        Allergies    penicillins (Unknown)    Intolerances        I&O's Summary    15 Apr 2022 07:01  -  15 Apr 2022 14:55  --------------------------------------------------------  IN: 0 mL / OUT: 1000 mL / NET: -1000 mL          REVIEW OF SYSTEMS:    CONSTITUTIONAL:  As per HPI.  CONSTITUTIONAL: No weakness, fevers or chills  EYES/ENT: No visual changes;  No vertigo or throat pain   NECK: No pain or stiffness  CARDIOVASCULAR: No chest pain or palpitations  GASTROINTESTINAL + abd distention, weight gain  GENITOURINARY: No dysuria, frequency or hematuria  NEUROLOGICAL: No numbness or weakness  SKIN: No itching, burning, rashes, or lesions   All other review of systems is negative unless indicated above    Vital Signs Last 24 Hrs  T(C): 36.4 (16 Apr 2022 16:00), Max: 36.8 (16 Apr 2022 00:00)  T(F): 97.5 (16 Apr 2022 16:00), Max: 98.2 (16 Apr 2022 00:00)  HR: 63 (16 Apr 2022 16:00) (63 - 83)  BP: 121/66 (16 Apr 2022 16:00) (121/66 - 139/69)  BP(mean): --  RR: 18 (16 Apr 2022 16:00) (18 - 18)  SpO2: 95% (16 Apr 2022 16:00) (93% - 95%)    Daily Height in cm: 182.88 (14 Apr 2022 18:28)    Daily  I&O's Detail    15 Apr 2022 07:01  -  16 Apr 2022 07:00  --------------------------------------------------------  IN:  Total IN: 0 mL    OUT:    Voided (mL): 2200 mL  Total OUT: 2200 mL    Total NET: -2200 mL    PHYSICAL EXAM:    General:  Alert, No acute distress.  mild confusion, agitated, confrontational     Neuro:  Alert and oriented to person, place, and time with  some difficulty    HEENT:  No JVD, no masses, Eyes anicteric, ,    Cardiovascular:  Regular rate and rhythm, with normal S1 and S2.    Lungs:  clear. no rales, no wheezing, .    Abdomen:  Normoactive bowel sounds. Soft, flat, non-tender, softly distended.  positive bowel sounds    Skin:  Warm, dry    Extremities:  warm,  no cyanosis    LABS:                                   12.8   5.77  )-----------( 76       ( 15 Apr 2022 09:06 )             38.5                         12.6   7.65  )-----------( 89       ( 14 Apr 2022 18:17 )             37.6     04-15    137  |  105  |  36<H>  ----------------------------<  151<H>  5.5<H>   |  23  |  2.03<H>    Ca    9.6      15 Apr 2022 20:55  Phos  3.9     04-15  Mg     2.3     04-15    TPro  8.2  /  Alb  3.7  /  TBili  1.3<H>  /  DBili  x   /  AST  48<H>  /  ALT  43  /  AlkPhos  99  04-15        137    |  106    |  41     ----------------------------<  150       15 Apr 2022 09:06  4.8     |  25     |  2.31     133    |  105    |  50     ----------------------------<  125       14 Apr 2022 18:17  5.2     |  21     |  3.08     Ca    9.7        15 Apr 2022 09:06  Ca    9.4        14 Apr 2022 18:17

## 2022-04-17 LAB
ALBUMIN SERPL ELPH-MCNC: 3.9 G/DL — SIGNIFICANT CHANGE UP (ref 3.3–5)
ALP SERPL-CCNC: 101 U/L — SIGNIFICANT CHANGE UP (ref 40–120)
ALT FLD-CCNC: 60 U/L — SIGNIFICANT CHANGE UP (ref 12–78)
AMMONIA BLD-MCNC: 35 UMOL/L — HIGH (ref 11–32)
ANION GAP SERPL CALC-SCNC: 6 MMOL/L — SIGNIFICANT CHANGE UP (ref 5–17)
AST SERPL-CCNC: 66 U/L — HIGH (ref 15–37)
BILIRUB SERPL-MCNC: 1.8 MG/DL — HIGH (ref 0.2–1.2)
BUN SERPL-MCNC: 31 MG/DL — HIGH (ref 7–23)
CALCIUM SERPL-MCNC: 10 MG/DL — SIGNIFICANT CHANGE UP (ref 8.5–10.1)
CHLORIDE SERPL-SCNC: 104 MMOL/L — SIGNIFICANT CHANGE UP (ref 96–108)
CO2 SERPL-SCNC: 26 MMOL/L — SIGNIFICANT CHANGE UP (ref 22–31)
CREAT SERPL-MCNC: 1.84 MG/DL — HIGH (ref 0.5–1.3)
EGFR: 43 ML/MIN/1.73M2 — LOW
FOLATE SERPL-MCNC: 15.7 NG/ML — SIGNIFICANT CHANGE UP
GLUCOSE SERPL-MCNC: 124 MG/DL — HIGH (ref 70–99)
HCT VFR BLD CALC: 43.6 % — SIGNIFICANT CHANGE UP (ref 39–50)
HGB BLD-MCNC: 14.6 G/DL — SIGNIFICANT CHANGE UP (ref 13–17)
INR BLD: 1.26 RATIO — HIGH (ref 0.88–1.16)
MCHC RBC-ENTMCNC: 33.5 GM/DL — SIGNIFICANT CHANGE UP (ref 32–36)
MCHC RBC-ENTMCNC: 33.6 PG — SIGNIFICANT CHANGE UP (ref 27–34)
MCV RBC AUTO: 100.5 FL — HIGH (ref 80–100)
PLATELET # BLD AUTO: 96 K/UL — LOW (ref 150–400)
POTASSIUM SERPL-MCNC: 4.6 MMOL/L — SIGNIFICANT CHANGE UP (ref 3.5–5.3)
POTASSIUM SERPL-SCNC: 4.6 MMOL/L — SIGNIFICANT CHANGE UP (ref 3.5–5.3)
PROT SERPL-MCNC: 8.9 GM/DL — HIGH (ref 6–8.3)
PROTHROM AB SERPL-ACNC: 14.7 SEC — HIGH (ref 10.5–13.4)
RBC # BLD: 4.34 M/UL — SIGNIFICANT CHANGE UP (ref 4.2–5.8)
RBC # FLD: 12.3 % — SIGNIFICANT CHANGE UP (ref 10.3–14.5)
SODIUM SERPL-SCNC: 136 MMOL/L — SIGNIFICANT CHANGE UP (ref 135–145)
T4 FREE SERPL-MCNC: 1.19 NG/DL — SIGNIFICANT CHANGE UP (ref 0.76–1.46)
TSH SERPL-MCNC: 3.11 UU/ML — SIGNIFICANT CHANGE UP (ref 0.34–4.82)
VIT B12 SERPL-MCNC: 948 PG/ML — SIGNIFICANT CHANGE UP (ref 232–1245)
WBC # BLD: 7.78 K/UL — SIGNIFICANT CHANGE UP (ref 3.8–10.5)
WBC # FLD AUTO: 7.78 K/UL — SIGNIFICANT CHANGE UP (ref 3.8–10.5)

## 2022-04-17 PROCEDURE — 99232 SBSQ HOSP IP/OBS MODERATE 35: CPT

## 2022-04-17 RX ADMIN — LACTULOSE 20 GRAM(S): 10 SOLUTION ORAL at 21:32

## 2022-04-17 RX ADMIN — ESCITALOPRAM OXALATE 20 MILLIGRAM(S): 10 TABLET, FILM COATED ORAL at 09:13

## 2022-04-17 RX ADMIN — LACTULOSE 20 GRAM(S): 10 SOLUTION ORAL at 14:33

## 2022-04-17 RX ADMIN — Medication 40 MILLIGRAM(S): at 21:34

## 2022-04-17 RX ADMIN — Medication 50 MILLIGRAM(S): at 21:33

## 2022-04-17 RX ADMIN — Medication 1 SPRAY(S): at 21:31

## 2022-04-17 RX ADMIN — OXYCODONE HYDROCHLORIDE 15 MILLIGRAM(S): 5 TABLET ORAL at 09:17

## 2022-04-17 RX ADMIN — Medication 1 MILLIGRAM(S): at 09:18

## 2022-04-17 RX ADMIN — OXYCODONE HYDROCHLORIDE 15 MILLIGRAM(S): 5 TABLET ORAL at 16:55

## 2022-04-17 RX ADMIN — SENNA PLUS 2 TABLET(S): 8.6 TABLET ORAL at 21:33

## 2022-04-17 RX ADMIN — OXYCODONE HYDROCHLORIDE 15 MILLIGRAM(S): 5 TABLET ORAL at 00:09

## 2022-04-17 RX ADMIN — Medication 1 TABLET(S): at 09:18

## 2022-04-17 RX ADMIN — Medication 50 MILLIGRAM(S): at 09:18

## 2022-04-17 NOTE — PROGRESS NOTE ADULT - SUBJECTIVE AND OBJECTIVE BOX
CC: Confusion  History of Present Illness:   50 y/o male w/ pmhx of HTN, umbilical hernia, GERD, chronic back pain, sleep apnea, RA, lyme disease, chronic liver failure on Xifaxin,  presenting with abd distension bilat leg edema and intermittent AMS for past several days.     S:  : Seen at bedside, prior to eating his lunch, pt sitting up, alert, answering questions appropriately.  Offers no complaints. Updated wife on plan of care.  : More alert, answering questions, awake.  Denies fever, chills, N, V, abd pain, CP, SOB.      REVIEW OF SYSTEMS: All other review of systems is negative unless indicated above.       Physical Exam:   Vital Signs Last 24 Hrs  T(C): 36.6 (2022 12:57), Max: 36.7 (2022 00:30)  T(F): 97.9 (2022 12:57), Max: 98.1 (2022 00:30)  HR: 69 (2022 12:57) (63 - 78)  BP: 130/61 (2022 12:57) (114/70 - 130/61)  BP(mean): --  RR: 17 (2022 12:57) (17 - 18)  SpO2: 95% (2022 12:57) (94% - 96%)      PHYSICAL EXAM:    Constitutional: NAD, awake and alert, well-developed, obese  HEENT: PERR, EOMI, Normal Hearing, MMM  Neck: Soft and supple  Respiratory: Breath sounds are clear bilaterally, No wheezing, rales or rhonchi  Cardiovascular: S1 and S2, regular rate and rhythm, no Murmurs, gallops or rubs  Gastrointestinal: Bowel Sounds present, soft, nontender, nondistended, no guarding, no rebound  Extremities: No peripheral edema  Neurological: A/O x 3, no focal deficits in my limited exam    MEDICATIONS  (STANDING):  escitalopram 20 milliGRAM(s) Oral daily  fluticasone propionate 50 MICROgram(s)/spray Nasal Spray 1 Spray(s) Both Nostrils two times a day  folic acid 1 milliGRAM(s) Oral daily  furosemide    Tablet 40 milliGRAM(s) Oral at bedtime  lactulose Syrup 20 Gram(s) Oral three times a day  metoprolol succinate ER 50 milliGRAM(s) Oral daily  multivitamin 1 Tablet(s) Oral daily  rifAXIMin 550 milliGRAM(s) Oral two times a day  senna 2 Tablet(s) Oral at bedtime  traZODone 50 milliGRAM(s) Oral at bedtime    MEDICATIONS  (PRN):  aluminum hydroxide/magnesium hydroxide/simethicone Suspension 30 milliLiter(s) Oral every 4 hours PRN Dyspepsia  LORazepam     Tablet 2 milliGRAM(s) Oral every 2 hours PRN Symptom-triggered 2 point increase in CIWA-Ar  LORazepam   Injectable 2 milliGRAM(s) IV Push every 2 hours PRN CIWA-Ar score increase by 2 points and a total score of 7 or less  LORazepam   Injectable 2 milliGRAM(s) IV Push every 1 hour PRN CIWA-Ar score 8 or greater  ondansetron Injectable 4 milliGRAM(s) IV Push every 6 hours PRN Nausea  oxyCODONE    IR 15 milliGRAM(s) Oral every 6 hours PRN Severe Pain (7 - 10)                                14.6   7.78  )-----------( 96       ( 2022 09:06 )             43.6     04-17    136  |  104  |  31<H>  ----------------------------<  124<H>  4.6   |  26  |  1.84<H>    Ca    10.0      2022 09:06  Phos  3.9     04-15  Mg     2.3     04-15    TPro  8.9<H>  /  Alb  3.9  /  TBili  1.8<H>  /  DBili  x   /  AST  66<H>  /  ALT  60  /  AlkPhos  101  04-17    CAPILLARY BLOOD GLUCOSE        LIVER FUNCTIONS - ( 2022 09:06 )  Alb: 3.9 g/dL / Pro: 8.9 gm/dL / ALK PHOS: 101 U/L / ALT: 60 U/L / AST: 66 U/L / GGT: x           PT/INR - ( 2022 09:06 )   PT: 14.7 sec;   INR: 1.26 ratio           Urinalysis Basic - ( 15 Apr 2022 16:10 )    Color: Yellow / Appearance: Clear / S.010 / pH: x  Gluc: x / Ketone: Negative  / Bili: Negative / Urobili: Negative   Blood: x / Protein: Negative / Nitrite: Negative   Leuk Esterase: Negative / RBC: x / WBC x   Sq Epi: x / Non Sq Epi: x / Bacteria: x        Assessment and Plan:  50 y/o male w/ pmhx of HTN, umbilical hernia, GERD, chronic back pain, sleep apnea, RA, lyme disease, chronic liver failure on Xifaxin,  presenting with abd distension bilat leg edema and intermittent AMS for past several days.         # Acute toxic-Hepatic encephalopathy due to chronic liver disease / hyperammonemia / polypharmacy:   - ammonia 63 --> 35  - CT hepatic steatosis / nodularity   - MR --> Gallstones  - cont w/ xifaxin   - lactulose dose adjusted per GI  - aldactone stopped due to hyperkalemia  - stopped seroquel, ambien  - lowered trazadone dose from 150mg to 50mg   - GI following, possible IR guided liver biopsy  - BCx neg x 2      # chronic etoh abuse without active withdrawal:  - no signs of withdrawal at this time  - PRN ciwa protocol   - multivitamin     # LESLI / hyperkalemia:  RESOLVED  - Scr trending down 3.08 --> 2.03 --> 1.84  - aldactone on hold due to hyperkalemia  - s/p IVFs and dose of lokelma   - monitor closely on lasix  - nephro following    # macrocytic anemia / thrombocytopenia / coagulopathy: Stable  - alcohol cessation     # HTN / GERD / chronic back pain / Depression / anxiety / ADHD / sleep apnea / RA:    - cont w/ metoprolol w/ hold parameters   - oxycodone 15mg (has been on this dose chronically)   - decreased trazadone 150mg to 50mg qhs  - Stopped seroquel, taper trazadone  - c/w lexapro   - unable to use CPAP due to covid precautions   - d/c ambien    # Pulmonary nodule  - out patient follow up

## 2022-04-17 NOTE — PROGRESS NOTE ADULT - ASSESSMENT
52yo male with etoh liver disease, encephalopathy    continue lactulose - having bms but also likely contributing to his abdominal distention  potential liver biopsy with IR  Dr De La Vega to resume care tomorrow

## 2022-04-17 NOTE — PROGRESS NOTE ADULT - SUBJECTIVE AND OBJECTIVE BOX
Patient is a 51y old  Male who presents with a chief complaint of abd distension and confusion (16 Apr 2022 16:41)      Subjective:  Pt feeling well, notes some abdominal distention  had bm  interactive and answering questions but wished me happy thanksgiving and not happy easter (unsure if just made a mistake or confused)      PAST MEDICAL & SURGICAL HISTORY:  Lyme disease    RA (rheumatoid arthritis)    Sleep apnea, unspecified type    Chronic back pain    GERD (gastroesophageal reflux disease)    Umbilical hernia    HTN (hypertension)    History of total right knee replacement    H/O spinal fusion  2014        MEDICATIONS  (STANDING):  escitalopram 20 milliGRAM(s) Oral daily  fluticasone propionate 50 MICROgram(s)/spray Nasal Spray 1 Spray(s) Both Nostrils two times a day  folic acid 1 milliGRAM(s) Oral daily  furosemide    Tablet 40 milliGRAM(s) Oral at bedtime  lactulose Syrup 20 Gram(s) Oral three times a day  metoprolol succinate ER 50 milliGRAM(s) Oral daily  multivitamin 1 Tablet(s) Oral daily  rifAXIMin 550 milliGRAM(s) Oral two times a day  senna 2 Tablet(s) Oral at bedtime  sodium zirconium cyclosilicate 10 Gram(s) Oral daily  traZODone 50 milliGRAM(s) Oral at bedtime    MEDICATIONS  (PRN):  aluminum hydroxide/magnesium hydroxide/simethicone Suspension 30 milliLiter(s) Oral every 4 hours PRN Dyspepsia  LORazepam     Tablet 2 milliGRAM(s) Oral every 2 hours PRN Symptom-triggered 2 point increase in CIWA-Ar  LORazepam   Injectable 2 milliGRAM(s) IV Push every 2 hours PRN CIWA-Ar score increase by 2 points and a total score of 7 or less  LORazepam   Injectable 2 milliGRAM(s) IV Push every 1 hour PRN CIWA-Ar score 8 or greater  ondansetron Injectable 4 milliGRAM(s) IV Push every 6 hours PRN Nausea  oxyCODONE    IR 15 milliGRAM(s) Oral every 6 hours PRN Severe Pain (7 - 10)      Allergies    penicillins (Unknown)    Intolerances        REVIEW OF SYSTEMS:    CONSTITUTIONAL: No weakness, fevers or chills  RESPIRATORY: No cough, wheezing, hemoptysis; No shortness of breath  CARDIOVASCULAR: No chest pain or palpitations  GASTROINTESTINAL: as above  All other review of systems is negative unless indicated above.    Vital Signs Last 24 Hrs  T(C): 36.6 (17 Apr 2022 05:15), Max: 36.7 (17 Apr 2022 00:30)  T(F): 97.8 (17 Apr 2022 05:15), Max: 98.1 (17 Apr 2022 00:30)  HR: 73 (17 Apr 2022 05:15) (63 - 73)  BP: 128/60 (17 Apr 2022 05:15) (117/60 - 128/60)  BP(mean): --  RR: 18 (17 Apr 2022 05:15) (18 - 18)  SpO2: 94% (17 Apr 2022 05:15) (94% - 95%)    PHYSICAL EXAM:    Constitutional: NAD, well-developed  Respiratory: CTAB  Cardiovascular: S1 and S2  Gastrointestinal: BS+, soft, mild distention      LABS:                        12.8   5.77  )-----------( 76       ( 15 Apr 2022 09:06 )             38.5     04-16    135  |  104  |  39<H>  ----------------------------<  138<H>  5.5<H>   |  27  |  2.19<H>    Ca    10.0      16 Apr 2022 18:12  Phos  3.9     04-15  Mg     2.3     04-15    TPro  8.0  /  Alb  3.5  /  TBili  1.4<H>  /  DBili  0.4<H>  /  AST  54<H>  /  ALT  48  /  AlkPhos  95  04-16    PT/INR - ( 15 Apr 2022 09:06 )   PT: 14.8 sec;   INR: 1.27 ratio           LIVER FUNCTIONS - ( 16 Apr 2022 09:17 )  Alb: 3.5 g/dL / Pro: 8.0 gm/dL / ALK PHOS: 95 U/L / ALT: 48 U/L / AST: 54 U/L / GGT: x             RADIOLOGY & ADDITIONAL STUDIES:

## 2022-04-18 LAB
ALBUMIN SERPL ELPH-MCNC: 3.8 G/DL — SIGNIFICANT CHANGE UP (ref 3.3–5)
ALP SERPL-CCNC: 98 U/L — SIGNIFICANT CHANGE UP (ref 40–120)
ALT FLD-CCNC: 54 U/L — SIGNIFICANT CHANGE UP (ref 12–78)
AMMONIA BLD-MCNC: 28 UMOL/L — SIGNIFICANT CHANGE UP (ref 11–32)
ANA TITR SER: NEGATIVE — SIGNIFICANT CHANGE UP
ANION GAP SERPL CALC-SCNC: 6 MMOL/L — SIGNIFICANT CHANGE UP (ref 5–17)
AST SERPL-CCNC: 50 U/L — HIGH (ref 15–37)
BILIRUB SERPL-MCNC: 2.4 MG/DL — HIGH (ref 0.2–1.2)
BUN SERPL-MCNC: 27 MG/DL — HIGH (ref 7–23)
CALCIUM SERPL-MCNC: 10.1 MG/DL — SIGNIFICANT CHANGE UP (ref 8.5–10.1)
CHLORIDE SERPL-SCNC: 102 MMOL/L — SIGNIFICANT CHANGE UP (ref 96–108)
CO2 SERPL-SCNC: 26 MMOL/L — SIGNIFICANT CHANGE UP (ref 22–31)
CREAT SERPL-MCNC: 1.61 MG/DL — HIGH (ref 0.5–1.3)
EGFR: 51 ML/MIN/1.73M2 — LOW
GLUCOSE SERPL-MCNC: 154 MG/DL — HIGH (ref 70–99)
PLATELET # BLD AUTO: 82 K/UL — LOW (ref 150–400)
POTASSIUM SERPL-MCNC: 4.4 MMOL/L — SIGNIFICANT CHANGE UP (ref 3.5–5.3)
POTASSIUM SERPL-SCNC: 4.4 MMOL/L — SIGNIFICANT CHANGE UP (ref 3.5–5.3)
PROT SERPL-MCNC: 8.5 GM/DL — HIGH (ref 6–8.3)
SARS-COV-2 RNA SPEC QL NAA+PROBE: SIGNIFICANT CHANGE UP
SODIUM SERPL-SCNC: 134 MMOL/L — LOW (ref 135–145)

## 2022-04-18 PROCEDURE — 99232 SBSQ HOSP IP/OBS MODERATE 35: CPT

## 2022-04-18 RX ORDER — LACTULOSE 10 G/15ML
20 SOLUTION ORAL DAILY
Refills: 0 | Status: DISCONTINUED | OUTPATIENT
Start: 2022-04-19 | End: 2022-04-19

## 2022-04-18 RX ADMIN — Medication 1 SPRAY(S): at 21:54

## 2022-04-18 RX ADMIN — OXYCODONE HYDROCHLORIDE 15 MILLIGRAM(S): 5 TABLET ORAL at 16:00

## 2022-04-18 RX ADMIN — Medication 50 MILLIGRAM(S): at 21:53

## 2022-04-18 RX ADMIN — Medication 50 MILLIGRAM(S): at 10:08

## 2022-04-18 RX ADMIN — SENNA PLUS 2 TABLET(S): 8.6 TABLET ORAL at 21:53

## 2022-04-18 RX ADMIN — OXYCODONE HYDROCHLORIDE 15 MILLIGRAM(S): 5 TABLET ORAL at 10:05

## 2022-04-18 RX ADMIN — OXYCODONE HYDROCHLORIDE 15 MILLIGRAM(S): 5 TABLET ORAL at 11:05

## 2022-04-18 RX ADMIN — ESCITALOPRAM OXALATE 20 MILLIGRAM(S): 10 TABLET, FILM COATED ORAL at 10:08

## 2022-04-18 RX ADMIN — OXYCODONE HYDROCHLORIDE 15 MILLIGRAM(S): 5 TABLET ORAL at 16:49

## 2022-04-18 RX ADMIN — OXYCODONE HYDROCHLORIDE 15 MILLIGRAM(S): 5 TABLET ORAL at 00:13

## 2022-04-18 RX ADMIN — Medication 40 MILLIGRAM(S): at 21:53

## 2022-04-18 RX ADMIN — Medication 1 SPRAY(S): at 10:09

## 2022-04-18 NOTE — PROGRESS NOTE ADULT - ASSESSMENT
50yo male with a sig PMH of progressive liver disease, portal HTN, and fluid overload   admitted with Alcoholic Cirrhosis and Hepatic Encephalopathy     - Continue lactulose - BID - TID titrate to 2-3 loose stools    - Continue daily Xifaxin   - Monitor ammonia level and liver enzymes    - Liver biopsy with IR   51M with progressive liver disease, portal HTN and fluid overload, likely due to alcoholic cirrhosis and steatohepatitis from obesity and diabetes.   Now with hepatic encephalopathy and decreased renal function on diuretics.    Rec:  -diuretics per renal   -monitor Cr   monitor ammonia level and liver enzymes  -continue laculose   -continue Xifaxin  -EtOH cessation  -IR liver biopsy    51M with progressive liver disease, portal HTN and fluid overload, likely due to alcoholic cirrhosis and steatohepatitis from obesity and diabetes.   Now with hepatic encephalopathy and decreased renal function on diuretics.    Rec:  -diuretics per renal   -monitor Cr   monitor ammonia level and liver enzymes  -continue laculose   -continue Xifaxin  -EtOH cessation  -IR liver biopsy    - Spoke with TERESA Bourgeois from IR pt on schedule for tomorrow   - NPO after midnight

## 2022-04-18 NOTE — PROGRESS NOTE ADULT - ASSESSMENT
50 y/o male w/ pmhx of HTN, umbilical hernia, GERD, chronic back pain, sleep apnea, RA, lyme disease, chronic liver failure on Xifaxin,  presenting with abd distension bilat leg edema and intermittent AMS for past several days.     CT abd-  Suggestion of subtle peritoneal haziness along  with subtle nodularities. There are collateral vessels identified within the abdomen, limiting detailed evaluation. Consider nonemergent correlation with PET/CT to exclude peritoneal carcinomatosis or metastatic disease.  Ammonia level elevated at 63 and creatinine 3.08     MELD score 19  (15 Apr 2022 09:43)  Above from Chart:  Pts wife at bedside  Stating pt was drinking a pint a day, and recently reduced to 1-2 drinks per day  Pt states he stopped  Admitted w LESLI on Lasix   Creat improving  pt making good amnt of urine   MRI abd no malignancy    A/P  Cirrhosis, elevated ammonia  LESLI, on po lasix  No ascites no abd malignancy  If UO adequate on PO lasix may cont or switch to po Bumex  Better bioavailability IV ~ PO  dc aldactone for now due to elevated K level  Lokelma x1 20 g    4/16  overall feels well  labs pending   no new events    4/18 SY  --LESLI : Continue po lasix  --Cirrhosis with unclear etiology. : for Liver bx today.  --HTN : BP well controlled.

## 2022-04-18 NOTE — PROGRESS NOTE ADULT - SUBJECTIVE AND OBJECTIVE BOX
Progress Note:   · Provider Specialty	Hospitalist    Reason for Admission:   Reason for Admission:  · Reason for Admission	abd distension and confusion      · Subjective and Objective:   CC: Confusion  History of Present Illness:   52 y/o male w/ pmhx of HTN, umbilical hernia, GERD, chronic back pain, sleep apnea, RA, lyme disease, chronic liver failure on Xifaxin,  presenting with abd distension bilat leg edema and intermittent AMS for past several days.     S:  : Seen at bedside, prior to eating his lunch, pt sitting up, alert, answering questions appropriately.  Offers no complaints. Updated wife on plan of care.  : More alert, answering questions, awake.  Denies fever, chills, N, V, abd pain, CP, SOB.      REVIEW OF SYSTEMS: All other review of systems is negative unless indicated above.       Physical Exam:   Vital Signs Last 24 Hrs  T(C): 36.6 (2022 12:57), Max: 36.7 (2022 00:30)  T(F): 97.9 (2022 12:57), Max: 98.1 (2022 00:30)  HR: 69 (2022 12:57) (63 - 78)  BP: 130/61 (2022 12:57) (114/70 - 130/61)  BP(mean): --  RR: 17 (2022 12:57) (17 - 18)  SpO2: 95% (2022 12:57) (94% - 96%)      PHYSICAL EXAM:    Constitutional: NAD, awake and alert, well-developed, obese  HEENT: PERR, EOMI, Normal Hearing, MMM  Neck: Soft and supple  Respiratory: Breath sounds are clear bilaterally, No wheezing, rales or rhonchi  Cardiovascular: S1 and S2, regular rate and rhythm, no Murmurs, gallops or rubs  Gastrointestinal: Bowel Sounds present, soft, nontender, nondistended, no guarding, no rebound  Extremities: No peripheral edema  Neurological: A/O x 3, no focal deficits in my limited exam    MEDICATIONS  (STANDING):  escitalopram 20 milliGRAM(s) Oral daily  fluticasone propionate 50 MICROgram(s)/spray Nasal Spray 1 Spray(s) Both Nostrils two times a day  folic acid 1 milliGRAM(s) Oral daily  furosemide    Tablet 40 milliGRAM(s) Oral at bedtime  lactulose Syrup 20 Gram(s) Oral three times a day  metoprolol succinate ER 50 milliGRAM(s) Oral daily  multivitamin 1 Tablet(s) Oral daily  rifAXIMin 550 milliGRAM(s) Oral two times a day  senna 2 Tablet(s) Oral at bedtime  traZODone 50 milliGRAM(s) Oral at bedtime    MEDICATIONS  (PRN):  aluminum hydroxide/magnesium hydroxide/simethicone Suspension 30 milliLiter(s) Oral every 4 hours PRN Dyspepsia  LORazepam     Tablet 2 milliGRAM(s) Oral every 2 hours PRN Symptom-triggered 2 point increase in CIWA-Ar  LORazepam   Injectable 2 milliGRAM(s) IV Push every 2 hours PRN CIWA-Ar score increase by 2 points and a total score of 7 or less  LORazepam   Injectable 2 milliGRAM(s) IV Push every 1 hour PRN CIWA-Ar score 8 or greater  ondansetron Injectable 4 milliGRAM(s) IV Push every 6 hours PRN Nausea  oxyCODONE    IR 15 milliGRAM(s) Oral every 6 hours PRN Severe Pain (7 - 10)                                14.6   7.78  )-----------( 96       ( 2022 09:06 )             43.6     04-17    136  |  104  |  31<H>  ----------------------------<  124<H>  4.6   |  26  |  1.84<H>    Ca    10.0      2022 09:06  Phos  3.9     04-15  Mg     2.3     04-15    TPro  8.9<H>  /  Alb  3.9  /  TBili  1.8<H>  /  DBili  x   /  AST  66<H>  /  ALT  60  /  AlkPhos  101  04-17    CAPILLARY BLOOD GLUCOSE        LIVER FUNCTIONS - ( 2022 09:06 )  Alb: 3.9 g/dL / Pro: 8.9 gm/dL / ALK PHOS: 101 U/L / ALT: 60 U/L / AST: 66 U/L / GGT: x           PT/INR - ( 2022 09:06 )   PT: 14.7 sec;   INR: 1.26 ratio           Urinalysis Basic - ( 15 Apr 2022 16:10 )    Color: Yellow / Appearance: Clear / S.010 / pH: x  Gluc: x / Ketone: Negative  / Bili: Negative / Urobili: Negative   Blood: x / Protein: Negative / Nitrite: Negative   Leuk Esterase: Negative / RBC: x / WBC x   Sq Epi: x / Non Sq Epi: x / Bacteria: x        Assessment and Plan:  52 y/o male w/ pmhx of HTN, umbilical hernia, GERD, chronic back pain, sleep apnea, RA, lyme disease, chronic liver failure on Xifaxin,  presenting with abd distension bilat leg edema and intermittent AMS for past several days.         # Acute toxic-Hepatic encephalopathy due to chronic liver disease / hyperammonemia / polypharmacy:   - Mentation improving   - ammonia 63 --> 35 --> 28  - CT hepatic steatosis / nodularity   - MR --> Gallstones  - cont w/ xifaxin   - lactulose, reduce dose  - aldactone stopped due to hyperkalemia  - stopped seroquel, ambien  - lowered trazadone dose from 150mg to  50mg   - GI following, possible IR guided liver biopsy  - BCx neg x 2      # chronic etoh abuse without active withdrawal:  - no signs of withdrawal at this time  - stop CIWA protocol  - multivitamin     # LESLI / hyperkalemia:  RESOLVED  - Scr trending down 3.08 --> 2.03 --> 1.84 --> 1.61  - aldactone on hold due to hyperkalemia  - s/p IVFs and dose of lokelma   - monitor closely on lasix  - nephro following    # macrocytic anemia / thrombocytopenia / hyperbilirubinemia / coagulopathy: Stable  - alcohol cessation     # HTN / GERD / chronic back pain / Depression / anxiety / ADHD / sleep apnea / RA:    - cont w/ metoprolol w/ hold parameters   - oxycodone 15mg (has been on this dose chronically)   - decreased trazadone 150mg to 50mg qhs  - Stopped seroquel, taper trazadone  - c/w lexapro   - unable to use CPAP due to covid precautions   - d/c ambien    # Pulmonary nodule  - out patient follow up    dispo:  -d/c probably tomorrow

## 2022-04-18 NOTE — PROGRESS NOTE ADULT - SUBJECTIVE AND OBJECTIVE BOX
NEPHROLOGY INTERVAL HPI/OVERNIGHT EVENTS:    Date of Service: 04-18-22 @ 16:15    4/18--No new complaints. Denies any discomfort.     HPI:  50 y/o male w/ pmhx of HTN, umbilical hernia, GERD, chronic back pain, sleep apnea, RA, lyme disease, chronic liver failure on Xifaxin,  presenting with abd distension bilat leg edema and intermittent AMS for past several days.     CT abd-  Suggestion of subtle peritoneal haziness along  with subtle nodularities. There are collateral vessels identified within the abdomen, limiting detailed evaluation. Consider nonemergent correlation with PET/CT to exclude peritoneal carcinomatosis or metastatic disease.  Ammonia level elevated at 63 and creatinine 3.08     MELD score 19  (15 Apr 2022 09:43)  Above from Chart:  Pts wife at bedside  Stating pt was drinking a pint a day, and recently reduced to 1-2 drinks per day  Pt states he stopped  Admitted w LESLI on Lasix   Creat improving  pt making good amnt of urine   MRI abd no malignancy    4/16  More awake alert  no new issues  Creat improving   good uo    MEDICATIONS  (STANDING):  escitalopram 20 milliGRAM(s) Oral daily  fluticasone propionate 50 MICROgram(s)/spray Nasal Spray 1 Spray(s) Both Nostrils two times a day  furosemide    Tablet 40 milliGRAM(s) Oral at bedtime  metoprolol succinate ER 50 milliGRAM(s) Oral daily  rifAXIMin 550 milliGRAM(s) Oral two times a day  senna 2 Tablet(s) Oral at bedtime  traZODone 50 milliGRAM(s) Oral at bedtime    MEDICATIONS  (PRN):  aluminum hydroxide/magnesium hydroxide/simethicone Suspension 30 milliLiter(s) Oral every 4 hours PRN Dyspepsia  artificial  tears Solution 1 Drop(s) Both EYES every 8 hours PRN Dry Eyes  ondansetron Injectable 4 milliGRAM(s) IV Push every 6 hours PRN Nausea  oxyCODONE    IR 15 milliGRAM(s) Oral every 6 hours PRN Severe Pain (7 - 10)    Vital Signs Last 24 Hrs  T(C): 36.8 (18 Apr 2022 15:09), Max: 36.8 (18 Apr 2022 15:09)  T(F): 98.2 (18 Apr 2022 15:09), Max: 98.2 (18 Apr 2022 15:09)  HR: 70 (18 Apr 2022 15:09) (70 - 85)  BP: 139/87 (18 Apr 2022 15:09) (125/66 - 146/78)  BP(mean): --  RR: 18 (18 Apr 2022 15:09) (18 - 18)  SpO2: 96% (18 Apr 2022 15:09) (94% - 96%)    PHYSICAL EXAM:  GENERAL: No distress  CHEST/LUNG: Clear to aus  HEART: S1S2 RRR  ABDOMEN: soft  EXTREMITIES: 1+ edema  SKIN:     LABS:                        x      x     )-----------( 82       ( 18 Apr 2022 09:23 )             x        04-18    134<L>  |  102  |  27<H>  ----------------------------<  154<H>  4.4   |  26  |  1.61<H>    Ca    10.1      18 Apr 2022 09:23    TPro  8.5<H>  /  Alb  3.8  /  TBili  2.4<H>  /  DBili  x   /  AST  50<H>  /  ALT  54  /  AlkPhos  98  04-18    PT/INR - ( 17 Apr 2022 09:06 )   PT: 14.7 sec;   INR: 1.26 ratio                     RADIOLOGY & ADDITIONAL TESTS:

## 2022-04-18 NOTE — PROGRESS NOTE ADULT - SUBJECTIVE AND OBJECTIVE BOX
Patient is a 51y old  Male admitted  with a chief complaint of abd distension and confusion       Subjective:  Pt laying in bed. He is very annoyed because he would like answers,  but feels no one is communicating with him.  He admits to diffuse abdominal pain upper and lower quadrants  with "gurgling".  He denies nausea or vomiting. He denies melena or BRBPR. He denies difficulty swallowing or regurgitation.   Admits to having BMs,  but frustrated because he does not have easy access to the bathroom.       PAST MEDICAL & SURGICAL HISTORY:  Lyme disease    RA (rheumatoid arthritis)    Sleep apnea, unspecified type    Chronic back pain    GERD (gastroesophageal reflux disease)    Umbilical hernia    HTN (hypertension)    History of total right knee replacement    H/O spinal fusion  2014        MEDICATIONS  (STANDING):  escitalopram 20 milliGRAM(s) Oral daily  fluticasone propionate 50 MICROgram(s)/spray Nasal Spray 1 Spray(s) Both Nostrils two times a day  folic acid 1 milliGRAM(s) Oral daily  furosemide    Tablet 40 milliGRAM(s) Oral at bedtime  lactulose Syrup 20 Gram(s) Oral three times a day  metoprolol succinate ER 50 milliGRAM(s) Oral daily  multivitamin 1 Tablet(s) Oral daily  rifAXIMin 550 milliGRAM(s) Oral two times a day  senna 2 Tablet(s) Oral at bedtime  sodium zirconium cyclosilicate 10 Gram(s) Oral daily  traZODone 50 milliGRAM(s) Oral at bedtime    MEDICATIONS  (PRN):  aluminum hydroxide/magnesium hydroxide/simethicone Suspension 30 milliLiter(s) Oral every 4 hours PRN Dyspepsia  LORazepam     Tablet 2 milliGRAM(s) Oral every 2 hours PRN Symptom-triggered 2 point increase in CIWA-Ar  LORazepam   Injectable 2 milliGRAM(s) IV Push every 2 hours PRN CIWA-Ar score increase by 2 points and a total score of 7 or less  LORazepam   Injectable 2 milliGRAM(s) IV Push every 1 hour PRN CIWA-Ar score 8 or greater  ondansetron Injectable 4 milliGRAM(s) IV Push every 6 hours PRN Nausea  oxyCODONE    IR 15 milliGRAM(s) Oral every 6 hours PRN Severe Pain (7 - 10)      Allergies    penicillins (Unknown)    Intolerances        REVIEW OF SYSTEMS:    CONSTITUTIONAL: No weakness, fevers or chills  RESPIRATORY: No cough, wheezing, hemoptysis; No shortness of breath  CARDIOVASCULAR: No chest pain or palpitations  GASTROINTESTINAL: as above  All other review of systems is negative unless indicated above.    Vital Signs Last 24 Hrs  T(C): 36.6 (18 Apr 2022 10:10), Max: 36.7 (17 Apr 2022 15:39)  T(F): 97.9 (18 Apr 2022 10:10), Max: 98 (17 Apr 2022 15:39)  HR: 85 (18 Apr 2022 10:10) (77 - 85)  BP: 146/78 (18 Apr 2022 10:10) (125/66 - 146/78)  BP(mean): --  RR: 18 (18 Apr 2022 10:10) (17 - 18)  SpO2: 96% (18 Apr 2022 10:10) (94% - 96%)    PHYSICAL EXAM:    Constitutional: NAD, well-developed  Skin: no jaundice  Eyes: no scleral icterus  Respiratory: CTA B/L  Cardiovascular: S1 and S2  Gastrointestinal: + multiple striae noted,  BS+, s +distention mild tenderness upon palpation of upper and lower quadrants  Extremity: no edema no erythema no cyanosis         LABS:                         x      x     )-----------( 82       ( 18 Apr 2022 09:23 )             x        04-18    134<L>  |  102  |  27<H>  ----------------------------<  154<H>  4.4   |  26  |  1.61<H>    Ca    10.1      18 Apr 2022 09:23    TPro  8.5<H>  /  Alb  3.8  /  TBili  2.4<H>  /  DBili  x   /  AST  50<H>  /  ALT  54  /  AlkPhos  98  04-18    PT/INR - ( 17 Apr 2022 09:06 )   PT: 14.7 sec;   INR: 1.26 ratio              Patient is a 51y old  Male admitted  with a chief complaint of abd distension and confusion       Subjective:  Pt laying in bed. He is very annoyed because he would like answers,  but feels no one is communicating with him.  He admits to diffuse abdominal pain upper and lower quadrants  with "gurgling".  He denies nausea or vomiting. He denies melena or BRBPR. He denies difficulty swallowing or regurgitation.   Admits to having BMs,  but frustrated because he does not have easy access to the bathroom.       PAST MEDICAL & SURGICAL HISTORY:  Lyme disease    RA (rheumatoid arthritis)    Sleep apnea, unspecified type    Chronic back pain    GERD (gastroesophageal reflux disease)    Umbilical hernia    HTN (hypertension)    History of total right knee replacement    H/O spinal fusion  2014        MEDICATIONS  (STANDING):  escitalopram 20 milliGRAM(s) Oral daily  fluticasone propionate 50 MICROgram(s)/spray Nasal Spray 1 Spray(s) Both Nostrils two times a day  folic acid 1 milliGRAM(s) Oral daily  furosemide    Tablet 40 milliGRAM(s) Oral at bedtime  lactulose Syrup 20 Gram(s) Oral three times a day  metoprolol succinate ER 50 milliGRAM(s) Oral daily  multivitamin 1 Tablet(s) Oral daily  rifAXIMin 550 milliGRAM(s) Oral two times a day  senna 2 Tablet(s) Oral at bedtime  sodium zirconium cyclosilicate 10 Gram(s) Oral daily  traZODone 50 milliGRAM(s) Oral at bedtime    MEDICATIONS  (PRN):  aluminum hydroxide/magnesium hydroxide/simethicone Suspension 30 milliLiter(s) Oral every 4 hours PRN Dyspepsia  LORazepam     Tablet 2 milliGRAM(s) Oral every 2 hours PRN Symptom-triggered 2 point increase in CIWA-Ar  LORazepam   Injectable 2 milliGRAM(s) IV Push every 2 hours PRN CIWA-Ar score increase by 2 points and a total score of 7 or less  LORazepam   Injectable 2 milliGRAM(s) IV Push every 1 hour PRN CIWA-Ar score 8 or greater  ondansetron Injectable 4 milliGRAM(s) IV Push every 6 hours PRN Nausea  oxyCODONE    IR 15 milliGRAM(s) Oral every 6 hours PRN Severe Pain (7 - 10)      Allergies    penicillins (Unknown)      Vital Signs Last 24 Hrs  T(C): 36.6 (18 Apr 2022 10:10), Max: 36.7 (17 Apr 2022 15:39)  T(F): 97.9 (18 Apr 2022 10:10), Max: 98 (17 Apr 2022 15:39)  HR: 85 (18 Apr 2022 10:10) (77 - 85)  BP: 146/78 (18 Apr 2022 10:10) (125/66 - 146/78)  BP(mean): --  RR: 18 (18 Apr 2022 10:10) (17 - 18)  SpO2: 96% (18 Apr 2022 10:10) (94% - 96%)    PHYSICAL EXAM:    Constitutional: NAD, obese AOx3  Skin: no jaundice  Eyes: no scleral icterus  Respiratory: CTA B/L  Cardiovascular: S1 and S2  Gastrointestinal: + multiple striae noted, + BS, +distention mild tenderness upon palpation of upper and lower quadrants  Extremity: no edema no erythema no cyanosis  distal pulses intact bilaterally  No asterixis       LABS:                         x      x     )-----------( 82       ( 18 Apr 2022 09:23 )             x        04-18    134<L>  |  102  |  27<H>  ----------------------------<  154<H>  4.4   |  26  |  1.61<H>    Ca    10.1      18 Apr 2022 09:23    TPro  8.5<H>  /  Alb  3.8  /  TBili  2.4<H>  /  DBili  x   /  AST  50<H>  /  ALT  54  /  AlkPhos  98  04-18    PT/INR - ( 17 Apr 2022 09:06 )   PT: 14.7 sec;   INR: 1.26 ratio                Pt laying in bed. He is very annoyed because he would like answers. He  feels no one is communicating with him.  He admits to diffuse abdominal pain upper and lower quadrants  with "gurgling".  He denies nausea or vomiting. He denies melena or BRBPR. He denies difficulty swallowing or regurgitation.   Admits to having BMs,  but frustrated because he does not have easy access to the bathroom.       PAST MEDICAL & SURGICAL HISTORY:  Lyme disease    RA (rheumatoid arthritis)    Sleep apnea, unspecified type    Chronic back pain    GERD (gastroesophageal reflux disease)    Umbilical hernia    HTN (hypertension)    History of total right knee replacement    H/O spinal fusion  2014        MEDICATIONS  (STANDING):  escitalopram 20 milliGRAM(s) Oral daily  fluticasone propionate 50 MICROgram(s)/spray Nasal Spray 1 Spray(s) Both Nostrils two times a day  folic acid 1 milliGRAM(s) Oral daily  furosemide    Tablet 40 milliGRAM(s) Oral at bedtime  lactulose Syrup 20 Gram(s) Oral three times a day  metoprolol succinate ER 50 milliGRAM(s) Oral daily  multivitamin 1 Tablet(s) Oral daily  rifAXIMin 550 milliGRAM(s) Oral two times a day  senna 2 Tablet(s) Oral at bedtime  sodium zirconium cyclosilicate 10 Gram(s) Oral daily  traZODone 50 milliGRAM(s) Oral at bedtime    MEDICATIONS  (PRN):  aluminum hydroxide/magnesium hydroxide/simethicone Suspension 30 milliLiter(s) Oral every 4 hours PRN Dyspepsia  LORazepam     Tablet 2 milliGRAM(s) Oral every 2 hours PRN Symptom-triggered 2 point increase in CIWA-Ar  LORazepam   Injectable 2 milliGRAM(s) IV Push every 2 hours PRN CIWA-Ar score increase by 2 points and a total score of 7 or less  LORazepam   Injectable 2 milliGRAM(s) IV Push every 1 hour PRN CIWA-Ar score 8 or greater  ondansetron Injectable 4 milliGRAM(s) IV Push every 6 hours PRN Nausea  oxyCODONE    IR 15 milliGRAM(s) Oral every 6 hours PRN Severe Pain (7 - 10)      Allergies    penicillins (Unknown)      Vital Signs Last 24 Hrs  T(C): 36.6 (18 Apr 2022 10:10), Max: 36.7 (17 Apr 2022 15:39)  T(F): 97.9 (18 Apr 2022 10:10), Max: 98 (17 Apr 2022 15:39)  HR: 85 (18 Apr 2022 10:10) (77 - 85)  BP: 146/78 (18 Apr 2022 10:10) (125/66 - 146/78)  BP(mean): --  RR: 18 (18 Apr 2022 10:10) (17 - 18)  SpO2: 96% (18 Apr 2022 10:10) (94% - 96%)    PHYSICAL EXAM:    Constitutional: NAD, obese AOx3  Skin: no jaundice  Eyes: no scleral icterus  Respiratory: CTA B/L  Cardiovascular: RRR normal s1 s2   Gastrointestinal: + multiple striae noted, + BS, hard with distention,  mild tenderness upon palpation of upper and lower quadrants  Extremity: no edema no erythema no cyanosis  distal pulses intact bilaterally  No asterixis       LABS:                         x      x     )-----------( 82       ( 18 Apr 2022 09:23 )             x        04-18    134<L>  |  102  |  27<H>  ----------------------------<  154<H>  4.4   |  26  |  1.61<H>    Ca    10.1      18 Apr 2022 09:23    TPro  8.5<H>  /  Alb  3.8  /  TBili  2.4<H>  /  DBili  x   /  AST  50<H>  /  ALT  54  /  AlkPhos  98  04-18    PT/INR - ( 17 Apr 2022 09:06 )   PT: 14.7 sec;   INR: 1.26 ratio

## 2022-04-19 ENCOUNTER — RESULT REVIEW (OUTPATIENT)
Age: 52
End: 2022-04-19

## 2022-04-19 LAB
ALBUMIN SERPL ELPH-MCNC: 3.6 G/DL — SIGNIFICANT CHANGE UP (ref 3.3–5)
ALP SERPL-CCNC: 93 U/L — SIGNIFICANT CHANGE UP (ref 40–120)
ALT FLD-CCNC: 53 U/L — SIGNIFICANT CHANGE UP (ref 12–78)
ANION GAP SERPL CALC-SCNC: 7 MMOL/L — SIGNIFICANT CHANGE UP (ref 5–17)
APTT BLD: 37.9 SEC — HIGH (ref 27.5–35.5)
AST SERPL-CCNC: 47 U/L — HIGH (ref 15–37)
BASOPHILS # BLD AUTO: 0.07 K/UL — SIGNIFICANT CHANGE UP (ref 0–0.2)
BASOPHILS NFR BLD AUTO: 0.8 % — SIGNIFICANT CHANGE UP (ref 0–2)
BILIRUB SERPL-MCNC: 2.1 MG/DL — HIGH (ref 0.2–1.2)
BUN SERPL-MCNC: 27 MG/DL — HIGH (ref 7–23)
CALCIUM SERPL-MCNC: 9.4 MG/DL — SIGNIFICANT CHANGE UP (ref 8.5–10.1)
CHLORIDE SERPL-SCNC: 100 MMOL/L — SIGNIFICANT CHANGE UP (ref 96–108)
CO2 SERPL-SCNC: 26 MMOL/L — SIGNIFICANT CHANGE UP (ref 22–31)
CREAT SERPL-MCNC: 1.54 MG/DL — HIGH (ref 0.5–1.3)
EGFR: 54 ML/MIN/1.73M2 — LOW
EOSINOPHIL # BLD AUTO: 0.23 K/UL — SIGNIFICANT CHANGE UP (ref 0–0.5)
EOSINOPHIL NFR BLD AUTO: 2.7 % — SIGNIFICANT CHANGE UP (ref 0–6)
GLUCOSE SERPL-MCNC: 158 MG/DL — HIGH (ref 70–99)
HCT VFR BLD CALC: 39.6 % — SIGNIFICANT CHANGE UP (ref 39–50)
HGB BLD-MCNC: 13.3 G/DL — SIGNIFICANT CHANGE UP (ref 13–17)
IMM GRANULOCYTES NFR BLD AUTO: 0.3 % — SIGNIFICANT CHANGE UP (ref 0–1.5)
INR BLD: 1.4 RATIO — HIGH (ref 0.88–1.16)
LYMPHOCYTES # BLD AUTO: 2.27 K/UL — SIGNIFICANT CHANGE UP (ref 1–3.3)
LYMPHOCYTES # BLD AUTO: 26.3 % — SIGNIFICANT CHANGE UP (ref 13–44)
MCHC RBC-ENTMCNC: 33.2 PG — SIGNIFICANT CHANGE UP (ref 27–34)
MCHC RBC-ENTMCNC: 33.6 GM/DL — SIGNIFICANT CHANGE UP (ref 32–36)
MCV RBC AUTO: 98.8 FL — SIGNIFICANT CHANGE UP (ref 80–100)
MONOCYTES # BLD AUTO: 0.93 K/UL — HIGH (ref 0–0.9)
MONOCYTES NFR BLD AUTO: 10.8 % — SIGNIFICANT CHANGE UP (ref 2–14)
NEUTROPHILS # BLD AUTO: 5.09 K/UL — SIGNIFICANT CHANGE UP (ref 1.8–7.4)
NEUTROPHILS NFR BLD AUTO: 59.1 % — SIGNIFICANT CHANGE UP (ref 43–77)
PLATELET # BLD AUTO: 77 K/UL — LOW (ref 150–400)
POTASSIUM SERPL-MCNC: 4 MMOL/L — SIGNIFICANT CHANGE UP (ref 3.5–5.3)
POTASSIUM SERPL-SCNC: 4 MMOL/L — SIGNIFICANT CHANGE UP (ref 3.5–5.3)
PROT SERPL-MCNC: 8.2 GM/DL — SIGNIFICANT CHANGE UP (ref 6–8.3)
PROTHROM AB SERPL-ACNC: 16.3 SEC — HIGH (ref 10.5–13.4)
RBC # BLD: 4.01 M/UL — LOW (ref 4.2–5.8)
RBC # FLD: 12.5 % — SIGNIFICANT CHANGE UP (ref 10.3–14.5)
SODIUM SERPL-SCNC: 133 MMOL/L — LOW (ref 135–145)
WBC # BLD: 8.62 K/UL — SIGNIFICANT CHANGE UP (ref 3.8–10.5)
WBC # FLD AUTO: 8.62 K/UL — SIGNIFICANT CHANGE UP (ref 3.8–10.5)

## 2022-04-19 PROCEDURE — 47000 NEEDLE BIOPSY OF LIVER PERQ: CPT

## 2022-04-19 PROCEDURE — 88313 SPECIAL STAINS GROUP 2: CPT | Mod: 26

## 2022-04-19 PROCEDURE — 76942 ECHO GUIDE FOR BIOPSY: CPT | Mod: 26

## 2022-04-19 PROCEDURE — 99233 SBSQ HOSP IP/OBS HIGH 50: CPT

## 2022-04-19 PROCEDURE — 88307 TISSUE EXAM BY PATHOLOGIST: CPT | Mod: 26

## 2022-04-19 RX ORDER — LACTULOSE 10 G/15ML
20 SOLUTION ORAL
Refills: 0 | Status: DISCONTINUED | OUTPATIENT
Start: 2022-04-19 | End: 2022-04-20

## 2022-04-19 RX ORDER — FENTANYL CITRATE 50 UG/ML
50 INJECTION INTRAVENOUS
Refills: 0 | Status: DISCONTINUED | OUTPATIENT
Start: 2022-04-19 | End: 2022-04-19

## 2022-04-19 RX ORDER — ONDANSETRON 8 MG/1
4 TABLET, FILM COATED ORAL ONCE
Refills: 0 | Status: DISCONTINUED | OUTPATIENT
Start: 2022-04-19 | End: 2022-04-19

## 2022-04-19 RX ORDER — SODIUM CHLORIDE 9 MG/ML
1000 INJECTION INTRAMUSCULAR; INTRAVENOUS; SUBCUTANEOUS
Refills: 0 | Status: DISCONTINUED | OUTPATIENT
Start: 2022-04-19 | End: 2022-04-19

## 2022-04-19 RX ORDER — OXYCODONE HYDROCHLORIDE 5 MG/1
5 TABLET ORAL EVERY 6 HOURS
Refills: 0 | Status: DISCONTINUED | OUTPATIENT
Start: 2022-04-19 | End: 2022-04-20

## 2022-04-19 RX ADMIN — FENTANYL CITRATE 50 MICROGRAM(S): 50 INJECTION INTRAVENOUS at 14:48

## 2022-04-19 RX ADMIN — OXYCODONE HYDROCHLORIDE 15 MILLIGRAM(S): 5 TABLET ORAL at 15:35

## 2022-04-19 RX ADMIN — SENNA PLUS 2 TABLET(S): 8.6 TABLET ORAL at 21:25

## 2022-04-19 RX ADMIN — Medication 40 MILLIGRAM(S): at 21:25

## 2022-04-19 RX ADMIN — FENTANYL CITRATE 50 MICROGRAM(S): 50 INJECTION INTRAVENOUS at 15:03

## 2022-04-19 RX ADMIN — Medication 50 MILLIGRAM(S): at 09:25

## 2022-04-19 RX ADMIN — Medication 1 SPRAY(S): at 21:24

## 2022-04-19 RX ADMIN — OXYCODONE HYDROCHLORIDE 15 MILLIGRAM(S): 5 TABLET ORAL at 00:10

## 2022-04-19 RX ADMIN — OXYCODONE HYDROCHLORIDE 15 MILLIGRAM(S): 5 TABLET ORAL at 09:29

## 2022-04-19 RX ADMIN — Medication 1 SPRAY(S): at 09:24

## 2022-04-19 RX ADMIN — OXYCODONE HYDROCHLORIDE 15 MILLIGRAM(S): 5 TABLET ORAL at 21:24

## 2022-04-19 RX ADMIN — OXYCODONE HYDROCHLORIDE 15 MILLIGRAM(S): 5 TABLET ORAL at 10:29

## 2022-04-19 RX ADMIN — Medication 50 MILLIGRAM(S): at 21:25

## 2022-04-19 RX ADMIN — OXYCODONE HYDROCHLORIDE 5 MILLIGRAM(S): 5 TABLET ORAL at 15:05

## 2022-04-19 RX ADMIN — OXYCODONE HYDROCHLORIDE 15 MILLIGRAM(S): 5 TABLET ORAL at 22:30

## 2022-04-19 RX ADMIN — ESCITALOPRAM OXALATE 20 MILLIGRAM(S): 10 TABLET, FILM COATED ORAL at 09:25

## 2022-04-19 NOTE — CHART NOTE - NSCHARTNOTEFT_GEN_A_CORE
Vascular & Interventional Radiology Pre-Procedure Note    Procedure Name: Imaging guided liver biopsy    HPI: 51y Male with abnormal LFT's        Acute renal failure    Family history of hypertension    Handoff    MEWS Score    Lyme disease    RA (rheumatoid arthritis)    Sleep apnea, unspecified type    Chronic back pain    GERD (gastroesophageal reflux disease)    Umbilical hernia    HTN (hypertension)    Lyme disease    RA (rheumatoid arthritis)    GERD (gastroesophageal reflux disease)    Umbilical hernia    HTN (hypertension)    LESLI (acute kidney injury)    No significant past surgical history    History of total right knee replacement    H/O spinal fusion    HEPATC ENCEPH AMS    90+    [email or web address]        Allergies: penicillins (Unknown)      Medications (Abx/Cardiac/Anticoagulation/Blood Products)  aluminum hydroxide/magnesium hydroxide/simethicone Suspension 30 milliLiter(s) Oral every 4 hours PRN  artificial  tears Solution 1 Drop(s) Both EYES every 8 hours PRN  escitalopram 20 milliGRAM(s) Oral daily  fluticasone propionate 50 MICROgram(s)/spray Nasal Spray 1 Spray(s) Both Nostrils two times a day  furosemide    Tablet 40 milliGRAM(s) Oral at bedtime  lactulose Syrup 20 Gram(s) Oral daily  metoprolol succinate ER 50 milliGRAM(s) Oral daily  ondansetron Injectable 4 milliGRAM(s) IV Push every 6 hours PRN  oxyCODONE    IR 15 milliGRAM(s) Oral every 6 hours PRN  rifAXIMin 550 milliGRAM(s) Oral two times a day  senna 2 Tablet(s) Oral at bedtime  traZODone 50 milliGRAM(s) Oral at bedtime      Data:      T(C): 36.6  HR: 70  BP: 167/74  RR: 18  SpO2: 94%    Exam  General: _______  Chest: _______  Abdomen: _______  Extremities: _______    -WBC 8.62 / HgB 13.3 / Hct 39.6 / Plt 77  -Na 133 / Cl 100 / BUN 27 / Glucose 158  -K 4.0 / CO2 26 / Cr 1.54  -ALT 53 / Alk Phos 93 / T.Bili 2.1  -INR1.40      Imaging: _______    Plan:   -51y Male presents for _______  -Risks/Benefits/alternatives explained with the patient and/or healthcare proxy and witnessed informed consent obtained. Vascular & Interventional Radiology Pre-Procedure Note    Procedure Name: Imaging guided liver biopsy    HPI: 51y Male with abnormal LFT's    50 y/o male w/ pmhx of HTN, umbilical hernia, GERD, chronic back pain, sleep apnea, RA, lyme disease, chronic liver failure on Xifaxin,  presenting with abd distension bilat leg edema and intermittent AMS for past several days. IR consulted for liver biopsy.    PMH/PSH  Lyme disease    RA (rheumatoid arthritis)    Sleep apnea, unspecified type    Chronic back pain    GERD (gastroesophageal reflux disease)    Umbilical hernia    HTN (hypertension)    History of total right knee replacement    H/O spinal fusion  2014        Allergies: penicillins (Unknown)      Medications (Abx/Cardiac/Anticoagulation/Blood Products)  aluminum hydroxide/magnesium hydroxide/simethicone Suspension 30 milliLiter(s) Oral every 4 hours PRN  artificial  tears Solution 1 Drop(s) Both EYES every 8 hours PRN  escitalopram 20 milliGRAM(s) Oral daily  fluticasone propionate 50 MICROgram(s)/spray Nasal Spray 1 Spray(s) Both Nostrils two times a day  furosemide    Tablet 40 milliGRAM(s) Oral at bedtime  lactulose Syrup 20 Gram(s) Oral daily  metoprolol succinate ER 50 milliGRAM(s) Oral daily  ondansetron Injectable 4 milliGRAM(s) IV Push every 6 hours PRN  oxyCODONE    IR 15 milliGRAM(s) Oral every 6 hours PRN  rifAXIMin 550 milliGRAM(s) Oral two times a day  senna 2 Tablet(s) Oral at bedtime  traZODone 50 milliGRAM(s) Oral at bedtime      Data:      T(C): 36.6  HR: 70  BP: 167/74  RR: 18  SpO2: 94%    Exam      -WBC 8.62 / HgB 13.3 / Hct 39.6 / Plt 77  -Na 133 / Cl 100 / BUN 27 / Glucose 158  -K 4.0 / CO2 26 / Cr 1.54  -ALT 53 / Alk Phos 93 / T.Bili 2.1  -INR1.40      Imaging: na    Plan:   -51y Male presents for Imaging guided liver biopsy  -Risks/Benefits/alternatives explained with the patient and witnessed informed consent obtained.

## 2022-04-19 NOTE — PROGRESS NOTE ADULT - PROVIDER SPECIALTY LIST ADULT
Hospitalist
Nephrology
Gastroenterology
Hospitalist
Hospitalist
Gastroenterology
Hospitalist
Gastroenterology
Gastroenterology
Nephrology

## 2022-04-19 NOTE — CONSULT NOTE ADULT - SUBJECTIVE AND OBJECTIVE BOX
Chief Complaint:  Patient is a 51y old  Male who presents with a chief complaint of needs liver biopsy    HPI:  50 y/o male w/ pmhx of HTN, umbilical hernia, GERD, chronic back pain, sleep apnea, RA, lyme disease, chronic liver failure on Xifaxin,  presenting with abd distension bilat leg edema and intermittent AMS for past several days. IR consulted for liver biopsy.     Allergies  penicillins (Unknown)    MEDICATIONS  (STANDING):  escitalopram 20 milliGRAM(s) Oral daily  fluticasone propionate 50 MICROgram(s)/spray Nasal Spray 1 Spray(s) Both Nostrils two times a day  furosemide    Tablet 40 milliGRAM(s) Oral at bedtime  lactulose Syrup 20 Gram(s) Oral daily  metoprolol succinate ER 50 milliGRAM(s) Oral daily  rifAXIMin 550 milliGRAM(s) Oral two times a day  senna 2 Tablet(s) Oral at bedtime  traZODone 50 milliGRAM(s) Oral at bedtime    MEDICATIONS  (PRN):  aluminum hydroxide/magnesium hydroxide/simethicone Suspension 30 milliLiter(s) Oral every 4 hours PRN Dyspepsia  artificial  tears Solution 1 Drop(s) Both EYES every 8 hours PRN Dry Eyes  ondansetron Injectable 4 milliGRAM(s) IV Push every 6 hours PRN Nausea  oxyCODONE    IR 15 milliGRAM(s) Oral every 6 hours PRN Severe Pain (7 - 10)      PAST MEDICAL & SURGICAL HISTORY:  Lyme disease    RA (rheumatoid arthritis)    Sleep apnea, unspecified type    Chronic back pain    GERD (gastroesophageal reflux disease)    Umbilical hernia    HTN (hypertension)    History of total right knee replacement    H/O spinal fusion  2014        FAMILY HISTORY:  Family history of hypertension  parent      Vital Signs Last 24 Hrs  T(C): 36.6 (19 Apr 2022 07:22), Max: 36.8 (18 Apr 2022 15:09)  T(F): 97.9 (19 Apr 2022 07:22), Max: 98.2 (18 Apr 2022 15:09)  HR: 70 (19 Apr 2022 07:22) (70 - 85)  BP: 167/74 (19 Apr 2022 07:22) (129/77 - 167/74)  BP(mean): --  RR: 18 (19 Apr 2022 07:22) (18 - 18)  SpO2: 94% (19 Apr 2022 07:22) (94% - 96%)      CBC                        13.3   8.62  )-----------( 77       ( 19 Apr 2022 07:33 )             39.6       Chemistry  04-19    133<L>  |  100  |  27<H>  ----------------------------<  158<H>  4.0   |  26  |  1.54<H>    Ca    9.4      19 Apr 2022 07:33    TPro  8.2  /  Alb  3.6  /  TBili  2.1<H>  /  DBili  x   /  AST  47<H>  /  ALT  53  /  AlkPhos  93  04-19    Coags  PT/INR - ( 19 Apr 2022 07:33 )   PT: 16.3 sec;   INR: 1.40 ratio    PTT - ( 19 Apr 2022 07:33 )  PTT:37.9 sec

## 2022-04-19 NOTE — PROGRESS NOTE ADULT - ASSESSMENT
51M with progressive liver disease, portal HTN and fluid overload, likely due to alcoholic cirrhosis and steatohepatitis from obesity and diabetes.   Now with hepatic encephalopathy and decreased renal function on diuretics-improved.  S/p liver bx.    Rec:  -diuretics per renal   -monitor Cr   monitor ammonia level and liver enzymes  -continue laculose 20 g BID  -continue Xifaxin  -EtOH cessation discussed in detail  -f/u results liver biopsy as outpt  -likely d/c home tomorrow if CBC stable in AM

## 2022-04-19 NOTE — PROGRESS NOTE ADULT - REASON FOR ADMISSION
abd distension and confusion

## 2022-04-19 NOTE — PROGRESS NOTE ADULT - SUBJECTIVE AND OBJECTIVE BOX
GI    s/p liver bx  mild pain after procedure but now feels ok  no n/v  dany po  no BM today    ROS:  no fever  no CP  no dyspnea  no rash      PHYSICAL EXAM:  Vital Signs Last 24 Hrs  T(C): 36.6 (19 Apr 2022 21:22), Max: 36.6 (19 Apr 2022 07:22)  T(F): 97.8 (19 Apr 2022 21:22), Max: 97.9 (19 Apr 2022 07:22)  HR: 67 (19 Apr 2022 21:22) (63 - 71)  BP: 118/64 (19 Apr 2022 21:22) (100/79 - 167/74)  BP(mean): --  RR: 18 (19 Apr 2022 21:22) (12 - 20)  SpO2: 95% (19 Apr 2022 21:22) (94% - 97%)    Constitutional: NAD, obese AOx3  Skin: no jaundice  Eyes: no scleral icterus  Respiratory: CTA B/L  Cardiovascular: RRR normal s1 s2   Gastrointestinal: + multiple striae noted, + BS, NT  Extremity: no edema no erythema no cyanosis  distal pulses intact bilaterally  No asterixis       LABS:                                            13.3   8.62  )-----------( 77       ( 19 Apr 2022 07:33 )             39.6     04-19    133<L>  |  100  |  27<H>  ----------------------------<  158<H>  4.0   |  26  |  1.54<H>    Ca    9.4      19 Apr 2022 07:33    TPro  8.2  /  Alb  3.6  /  TBili  2.1<H>  /  DBili  x   /  AST  47<H>  /  ALT  53  /  AlkPhos  93  04-19

## 2022-04-19 NOTE — CONSULT NOTE ADULT - ASSESSMENT
51M with progressive liver disease, portal HTN and fluid overload, likely due to alcoholic cirrhosis and steatohepatitis from obesity and diabetes.   Now with hepatic encephalopathy and decreased renal function on diuretics.    Rec:  -diuretics per renal (recent baseline Cr as outpt 1.45)  -avoid excess fluid administration if Cr improving  -monitor Cr  -lactulose BID-TID, titrate to 2-3 loose stools daily  -continue Xifaxin  -EtOH cessation  -considering liver biopsy given unclear etiology of liver disease, although history strongly favors EtOH (had full serologic panel as outpatient recently and was negative)  -will follow
52yo M with encephalopathy 2/2 liver disease referred to IR for biopsy  - Pt encephalopathic 2/2 ETOH, not consentable  - As per Dr. De La Vega, would like biopsy to r/o other causes of liver cirrhosis  - Reviewed with Dr. Marie, plan for liver biopsy  
50 y/o male w/ pmhx of HTN, umbilical hernia, GERD, chronic back pain, sleep apnea, RA, lyme disease, chronic liver failure on Xifaxin,  presenting with abd distension bilat leg edema and intermittent AMS for past several days.     CT abd-  Suggestion of subtle peritoneal haziness along  with subtle nodularities. There are collateral vessels identified within the abdomen, limiting detailed evaluation. Consider nonemergent correlation with PET/CT to exclude peritoneal carcinomatosis or metastatic disease.  Ammonia level elevated at 63 and creatinine 3.08     MELD score 19  (15 Apr 2022 09:43)  Above from Chart:  Pts wife at bedside  Stating pt was drinking a pint a day, and recently reduced to 1-2 drinks per day  Pt states he stopped  Admitted w LESLI on Lasix   Creat improving  pt making good amnt of urine   MRI abd no malignancy    A/P  Cirrhosis, elevated ammonia  LESLI, on po lasix  No ascites no abd malignancy  If UO adequate on PO lasix may cont or switch to po Bumex  Better bioavailability IV ~ PO  dc aldactone for now due to elevated K level  Lokelma x1 20 g

## 2022-04-19 NOTE — BRIEF OPERATIVE NOTE - OPERATION/FINDINGS
17 g coaxial needle right lobe liver. 18 g JB Therapeuticst core bx x 4. [employer] plug into needle tract.

## 2022-04-20 ENCOUNTER — TRANSCRIPTION ENCOUNTER (OUTPATIENT)
Age: 52
End: 2022-04-20

## 2022-04-20 VITALS
RESPIRATION RATE: 18 BRPM | TEMPERATURE: 98 F | DIASTOLIC BLOOD PRESSURE: 84 MMHG | HEART RATE: 72 BPM | SYSTOLIC BLOOD PRESSURE: 143 MMHG | OXYGEN SATURATION: 94 %

## 2022-04-20 LAB
CULTURE RESULTS: SIGNIFICANT CHANGE UP
CULTURE RESULTS: SIGNIFICANT CHANGE UP
HCT VFR BLD CALC: 41.1 % — SIGNIFICANT CHANGE UP (ref 39–50)
HGB BLD-MCNC: 14 G/DL — SIGNIFICANT CHANGE UP (ref 13–17)
MCHC RBC-ENTMCNC: 33.6 PG — SIGNIFICANT CHANGE UP (ref 27–34)
MCHC RBC-ENTMCNC: 34.1 GM/DL — SIGNIFICANT CHANGE UP (ref 32–36)
MCV RBC AUTO: 98.6 FL — SIGNIFICANT CHANGE UP (ref 80–100)
PLATELET # BLD AUTO: 80 K/UL — LOW (ref 150–400)
RBC # BLD: 4.17 M/UL — LOW (ref 4.2–5.8)
RBC # FLD: 12.4 % — SIGNIFICANT CHANGE UP (ref 10.3–14.5)
SPECIMEN SOURCE: SIGNIFICANT CHANGE UP
SPECIMEN SOURCE: SIGNIFICANT CHANGE UP
WBC # BLD: 8.41 K/UL — SIGNIFICANT CHANGE UP (ref 3.8–10.5)
WBC # FLD AUTO: 8.41 K/UL — SIGNIFICANT CHANGE UP (ref 3.8–10.5)

## 2022-04-20 PROCEDURE — 99239 HOSP IP/OBS DSCHRG MGMT >30: CPT

## 2022-04-20 RX ORDER — TRAZODONE HCL 50 MG
1 TABLET ORAL
Qty: 0 | Refills: 0 | DISCHARGE

## 2022-04-20 RX ORDER — LOSARTAN POTASSIUM 100 MG/1
1 TABLET, FILM COATED ORAL
Qty: 0 | Refills: 0 | DISCHARGE

## 2022-04-20 RX ORDER — CEFPODOXIME PROXETIL 100 MG
1 TABLET ORAL
Qty: 0 | Refills: 0 | DISCHARGE

## 2022-04-20 RX ORDER — SPIRONOLACTONE 25 MG/1
1 TABLET, FILM COATED ORAL
Qty: 0 | Refills: 0 | DISCHARGE

## 2022-04-20 RX ORDER — ZOLPIDEM TARTRATE 10 MG/1
1 TABLET ORAL
Qty: 0 | Refills: 0 | DISCHARGE

## 2022-04-20 RX ORDER — CLONAZEPAM 1 MG
1 TABLET ORAL
Qty: 0 | Refills: 0 | DISCHARGE

## 2022-04-20 RX ORDER — LACTULOSE 10 G/15ML
30 SOLUTION ORAL
Qty: 840 | Refills: 0
Start: 2022-04-20 | End: 2022-05-03

## 2022-04-20 RX ORDER — TRAZODONE HCL 50 MG
1 TABLET ORAL
Qty: 30 | Refills: 0
Start: 2022-04-20 | End: 2022-05-19

## 2022-04-20 RX ORDER — QUETIAPINE FUMARATE 200 MG/1
1 TABLET, FILM COATED ORAL
Qty: 0 | Refills: 0 | DISCHARGE

## 2022-04-20 RX ADMIN — LACTULOSE 20 GRAM(S): 10 SOLUTION ORAL at 10:22

## 2022-04-20 RX ADMIN — Medication 1 SPRAY(S): at 10:22

## 2022-04-20 RX ADMIN — OXYCODONE HYDROCHLORIDE 15 MILLIGRAM(S): 5 TABLET ORAL at 12:01

## 2022-04-20 RX ADMIN — OXYCODONE HYDROCHLORIDE 15 MILLIGRAM(S): 5 TABLET ORAL at 05:41

## 2022-04-20 RX ADMIN — Medication 50 MILLIGRAM(S): at 10:22

## 2022-04-20 RX ADMIN — ESCITALOPRAM OXALATE 20 MILLIGRAM(S): 10 TABLET, FILM COATED ORAL at 11:14

## 2022-04-20 NOTE — DISCHARGE NOTE PROVIDER - NSDCMRMEDTOKEN_GEN_ALL_CORE_FT
Concerta 18 mg/24 hr oral tablet, extended release: 1 tab(s) orally once a day (in the morning)  escitalopram 20 mg oral tablet: 1 tab(s) orally once a day  Flonase 50 mcg/inh nasal spray: 1 spray(s) nasal once a day  furosemide 40 mg oral tablet: 1 tab(s) orally once a day (at bedtime)  lactulose 10 g/15 mL oral syrup: 30 milliliter(s) orally 2 times a day  metoprolol succinate 50 mg oral tablet, extended release: 1 tab(s) orally once a day  oxyCODONE 15 mg oral tablet: 1 tab(s) orally every 6 hours  traZODone 50 mg oral tablet: 1 tab(s) orally once a day (at bedtime)  Xifaxan 550 mg oral tablet: 1 tab(s) orally 2 times a day

## 2022-04-20 NOTE — DISCHARGE NOTE PROVIDER - PROVIDER TOKENS
PROVIDER:[TOKEN:[[id number]],FOLLOWUP:[1 week]],PROVIDER:[TOKEN:[[id number]],FOLLOWUP:[1 week]],FREE:[LAST:[Follow up with psychiatrist routine],PHONE:[(   )    -],FAX:[(   )    -]]

## 2022-04-20 NOTE — DISCHARGE NOTE PROVIDER - CARE PROVIDERS DIRECT ADDRESSES
,[email or web address],DirectAddress_Unknown,DirectAddress_Unknown

## 2022-04-20 NOTE — DISCHARGE NOTE PROVIDER - HOSPITAL COURSE
CC: Confusion  History of Present Illness:   52 y/o male w/ pmhx of HTN, umbilical hernia, GERD, chronic back pain, sleep apnea, RA, lyme disease, chronic liver failure on Xifaxin,  presenting with abd distension bilat leg edema and intermittent AMS for past several days.     Hospital course:  Pt admitted and treated for acute toxic-Hepatic encephalopathy due to chronic liver disease with hyperammonemia complicated by polypharmacy.  Pt's ammonia level improved from 63 to 28 on xifaxin and lactulose.  MR revealing for gallstones but no acute pathology.  Pt with hepatic steatosis in line with his liver disease, etiology probably due to alcoholism however pt had liver biopsy on this admission to rule out other causes.  He will follow up results with his gastroenterologist as out patient.  His meds were adjusted, specifically, trazadone reduced from 150mg to 50mg, ambien PRN stopped, seroquel stopped.  Pt also seen by nephrology for LESLI with hyperkalemia, resolved with stopping of aldactone and with some iv fluid hydration.  Pt feeling well, mentation at baseline.  He can go home with out patient follow up.    REVIEW OF SYSTEMS: All other review of systems is negative unless indicated above.    Vital Signs Last 24 Hrs  T(C): 36.6 (20 Apr 2022 08:33), Max: 36.6 (19 Apr 2022 14:26)  T(F): 97.8 (20 Apr 2022 08:33), Max: 97.9 (19 Apr 2022 14:26)  HR: 72 (20 Apr 2022 08:33) (63 - 72)  BP: 143/84 (20 Apr 2022 08:33) (100/79 - 143/84)  BP(mean): --  RR: 18 (20 Apr 2022 08:33) (12 - 20)  SpO2: 94% (20 Apr 2022 08:33) (94% - 97%)    PHYSICAL EXAM:    Constitutional: NAD, awake and alert, well-developed, obese  HEENT: PERR, EOMI, Normal Hearing, MMM  Neck: Soft and supple  Respiratory: Breath sounds are clear bilaterally, No wheezing, rales or rhonchi  Cardiovascular: S1 and S2, regular rate and rhythm, no Murmurs, gallops or rubs  Gastrointestinal: Bowel Sounds present, soft, nontender, nondistended, no guarding, no rebound  Extremities: No peripheral edema  Neurological: A/O x 3, no focal deficits in my limited exam    med/labs: Reviewed and interpreted       Assessment and Plan:  52 y/o male w/ pmhx of HTN, umbilical hernia, GERD, chronic back pain, sleep apnea, RA, lyme disease, chronic liver failure on Xifaxin,  presenting with abd distension bilat leg edema and intermittent AMS for past several days.         # Acute toxic-Hepatic encephalopathy due to chronic liver disease / hyperammonemia / polypharmacy:   - Mentation improving, at baseline   - ammonia 63 --> 35 --> 28  - CT hepatic steatosis / nodularity   - MR --> Gallstones  - cont w/ xifaxin and lactulose as out patient  - aldactone stopped due to hyperkalemia - now  RESOLVED  - stopped seroquel, ambien  - recommend stopping home ativan on d/c  - lowered trazadone dose from 150mg to  50mg   - GI following, s/p IR guided liver biopsy 4/19 with out complication  - BCx neg x 2      # chronic etoh abuse without active withdrawal:  - no signs of withdrawal at this time  - stop CIWA protocol  - multivitamin     # LESLI / hyperkalemia:  RESOLVED  - Scr trending down 3.08 --> 2.03 --> 1.84 --> 1.61  - aldactone on hold due to hyperkalemia - would not restart until out patient follow up.  - s/p IVFs and dose of lokelma   - monitor closely on lasix  - stop home dose losartan until out patient follow up  - nephro following    # macrocytic anemia / thrombocytopenia / hyperbilirubinemia / coagulopathy: Stable  - alcohol cessation     # HTN / GERD / chronic back pain / Depression / anxiety / ADHD / sleep apnea / RA:    - cont w/ metoprolol w/ hold parameters   - oxycodone 15mg (has been on this dose chronically)   - decreased trazadone 150mg to 50mg qhs  - Stopped seroquel, taper trazadone  - c/w lexapro   - unable to use CPAP due to covid precautions   - d/c ambien    # Pulmonary nodule  - out patient follow up    dispo:  -discharge home with out patient GI and biopsy follow up.    Attending Statement: 40 minutes spent on total encounter and discharge planning.

## 2022-04-20 NOTE — DISCHARGE NOTE NURSING/CASE MANAGEMENT/SOCIAL WORK - NSDCPEFALRISK_GEN_ALL_CORE
For information on Fall & Injury Prevention, visit: [email or web address] OR  [email or web address] OR  https://www.cdc.gov/steadi/patient.html

## 2022-04-20 NOTE — DISCHARGE NOTE PROVIDER - NSDCCPCAREPLAN_GEN_ALL_CORE_FT
PRINCIPAL DISCHARGE DIAGNOSIS  Diagnosis: Encephalopathy acute  Assessment and Plan of Treatment: Multifactorial due to liver disease, alcoholism, hyperammonemia, and polypharmacy.  -Practice alcohol cessation   -Take lactulose and xifaxim as prescribed to keep ammonia level low.  -Stop home dose seroquel, ativan, ambien  -trazadone lowered from 150mg to 50mg (new script sent to pharmacy)  -Follow up with your gastroenterologist 1 week for biopsy results.      SECONDARY DISCHARGE DIAGNOSES  Diagnosis: LESLI (acute kidney injury)  Assessment and Plan of Treatment: RESOLVING  -Stop home dose losartan  -Stop home dose aldactone  -continue lasix as previously prescribed  -follow up with pcp 1 week

## 2022-04-20 NOTE — DISCHARGE NOTE PROVIDER - CARE PROVIDER_API CALL
Angel Cai)  Internal Medicine; Pulmonary Disease  [address], [address]  [place] [address]  Phone: [phone]  Fax: [phone]  Follow Up Time: 1 week    Bear De La Vega)  Gastroenterology; Internal Medicine  [address] [place] [address]  Phone: [phone]  Fax: [phone]  Follow Up Time: 1 week    Follow up with psychiatrist routine,   Phone: (   )    -  Fax: (   )    -  Follow Up Time:

## 2022-04-20 NOTE — DISCHARGE NOTE NURSING/CASE MANAGEMENT/SOCIAL WORK - PATIENT PORTAL LINK FT
You can access the FollowMyHealth Patient Portal offered by [facility] by registering at the following website: [email or web address]. By joining [employer]’s FollowMyHealth portal, you will also be able to view your health information using other applications (apps) compatible with our system.

## 2022-04-22 ENCOUNTER — INPATIENT (INPATIENT)
Facility: HOSPITAL | Age: 52
LOS: 0 days | Discharge: HOME | DRG: 909 | End: 2022-04-23
Attending: SURGERY | Admitting: SURGERY
Payer: COMMERCIAL

## 2022-04-22 ENCOUNTER — RESULT REVIEW (OUTPATIENT)
Age: 52
End: 2022-04-22

## 2022-04-22 ENCOUNTER — TRANSCRIPTION ENCOUNTER (OUTPATIENT)
Age: 52
End: 2022-04-22

## 2022-04-22 VITALS — HEIGHT: 76 IN | WEIGHT: 315 LBS

## 2022-04-22 DIAGNOSIS — Z98.1 ARTHRODESIS STATUS: Chronic | ICD-10-CM

## 2022-04-22 DIAGNOSIS — T14.8XXA OTHER INJURY OF UNSPECIFIED BODY REGION, INITIAL ENCOUNTER: ICD-10-CM

## 2022-04-22 DIAGNOSIS — Z96.651 PRESENCE OF RIGHT ARTIFICIAL KNEE JOINT: Chronic | ICD-10-CM

## 2022-04-22 LAB
ALBUMIN SERPL ELPH-MCNC: 3.9 G/DL — SIGNIFICANT CHANGE UP (ref 3.3–5)
ALP SERPL-CCNC: 95 U/L — SIGNIFICANT CHANGE UP (ref 40–120)
ALT FLD-CCNC: 54 U/L — SIGNIFICANT CHANGE UP (ref 12–78)
ANION GAP SERPL CALC-SCNC: 8 MMOL/L — SIGNIFICANT CHANGE UP (ref 5–17)
APTT BLD: 36.6 SEC — HIGH (ref 27.5–35.5)
AST SERPL-CCNC: 44 U/L — HIGH (ref 15–37)
BASOPHILS # BLD AUTO: 0.07 K/UL — SIGNIFICANT CHANGE UP (ref 0–0.2)
BASOPHILS NFR BLD AUTO: 0.8 % — SIGNIFICANT CHANGE UP (ref 0–2)
BILIRUB SERPL-MCNC: 2 MG/DL — HIGH (ref 0.2–1.2)
BUN SERPL-MCNC: 45 MG/DL — HIGH (ref 7–23)
CALCIUM SERPL-MCNC: 9.7 MG/DL — SIGNIFICANT CHANGE UP (ref 8.5–10.1)
CHLORIDE SERPL-SCNC: 99 MMOL/L — SIGNIFICANT CHANGE UP (ref 96–108)
CO2 SERPL-SCNC: 27 MMOL/L — SIGNIFICANT CHANGE UP (ref 22–31)
CREAT SERPL-MCNC: 3.34 MG/DL — HIGH (ref 0.5–1.3)
EGFR: 21 ML/MIN/1.73M2 — LOW
EOSINOPHIL # BLD AUTO: 0.16 K/UL — SIGNIFICANT CHANGE UP (ref 0–0.5)
EOSINOPHIL NFR BLD AUTO: 1.9 % — SIGNIFICANT CHANGE UP (ref 0–6)
GLUCOSE SERPL-MCNC: 192 MG/DL — HIGH (ref 70–99)
HCT VFR BLD CALC: 39.3 % — SIGNIFICANT CHANGE UP (ref 39–50)
HGB BLD-MCNC: 13.3 G/DL — SIGNIFICANT CHANGE UP (ref 13–17)
IMM GRANULOCYTES NFR BLD AUTO: 0.4 % — SIGNIFICANT CHANGE UP (ref 0–1.5)
INR BLD: 1.31 RATIO — HIGH (ref 0.88–1.16)
LYMPHOCYTES # BLD AUTO: 2 K/UL — SIGNIFICANT CHANGE UP (ref 1–3.3)
LYMPHOCYTES # BLD AUTO: 23.4 % — SIGNIFICANT CHANGE UP (ref 13–44)
MCHC RBC-ENTMCNC: 32.8 PG — SIGNIFICANT CHANGE UP (ref 27–34)
MCHC RBC-ENTMCNC: 33.8 GM/DL — SIGNIFICANT CHANGE UP (ref 32–36)
MCV RBC AUTO: 96.8 FL — SIGNIFICANT CHANGE UP (ref 80–100)
MONOCYTES # BLD AUTO: 0.67 K/UL — SIGNIFICANT CHANGE UP (ref 0–0.9)
MONOCYTES NFR BLD AUTO: 7.8 % — SIGNIFICANT CHANGE UP (ref 2–14)
NEUTROPHILS # BLD AUTO: 5.63 K/UL — SIGNIFICANT CHANGE UP (ref 1.8–7.4)
NEUTROPHILS NFR BLD AUTO: 65.7 % — SIGNIFICANT CHANGE UP (ref 43–77)
PLATELET # BLD AUTO: 74 K/UL — LOW (ref 150–400)
POTASSIUM SERPL-MCNC: 3.9 MMOL/L — SIGNIFICANT CHANGE UP (ref 3.5–5.3)
POTASSIUM SERPL-SCNC: 3.9 MMOL/L — SIGNIFICANT CHANGE UP (ref 3.5–5.3)
PROT SERPL-MCNC: 8.5 GM/DL — HIGH (ref 6–8.3)
PROTHROM AB SERPL-ACNC: 15.2 SEC — HIGH (ref 10.5–13.4)
RBC # BLD: 4.06 M/UL — LOW (ref 4.2–5.8)
RBC # FLD: 12.3 % — SIGNIFICANT CHANGE UP (ref 10.3–14.5)
SARS-COV-2 RNA SPEC QL NAA+PROBE: SIGNIFICANT CHANGE UP
SODIUM SERPL-SCNC: 134 MMOL/L — LOW (ref 135–145)
WBC # BLD: 8.56 K/UL — SIGNIFICANT CHANGE UP (ref 3.8–10.5)
WBC # FLD AUTO: 8.56 K/UL — SIGNIFICANT CHANGE UP (ref 3.8–10.5)

## 2022-04-22 PROCEDURE — 71045 X-RAY EXAM CHEST 1 VIEW: CPT | Mod: 26

## 2022-04-22 PROCEDURE — 88300 SURGICAL PATH GROSS: CPT

## 2022-04-22 PROCEDURE — 99285 EMERGENCY DEPT VISIT HI MDM: CPT

## 2022-04-22 PROCEDURE — 36415 COLL VENOUS BLD VENIPUNCTURE: CPT

## 2022-04-22 PROCEDURE — 74177 CT ABD & PELVIS W/CONTRAST: CPT | Mod: 26,MA

## 2022-04-22 PROCEDURE — 85027 COMPLETE CBC AUTOMATED: CPT

## 2022-04-22 PROCEDURE — 71275 CT ANGIOGRAPHY CHEST: CPT | Mod: 26,MA

## 2022-04-22 PROCEDURE — 93010 ELECTROCARDIOGRAM REPORT: CPT

## 2022-04-22 PROCEDURE — C9113: CPT

## 2022-04-22 PROCEDURE — 83735 ASSAY OF MAGNESIUM: CPT

## 2022-04-22 PROCEDURE — 84100 ASSAY OF PHOSPHORUS: CPT

## 2022-04-22 PROCEDURE — 74018 RADEX ABDOMEN 1 VIEW: CPT | Mod: 26

## 2022-04-22 PROCEDURE — 74018 RADEX ABDOMEN 1 VIEW: CPT

## 2022-04-22 PROCEDURE — 88300 SURGICAL PATH GROSS: CPT | Mod: 26

## 2022-04-22 PROCEDURE — 80048 BASIC METABOLIC PNL TOTAL CA: CPT

## 2022-04-22 RX ORDER — MORPHINE SULFATE 50 MG/1
4 CAPSULE, EXTENDED RELEASE ORAL ONCE
Refills: 0 | Status: DISCONTINUED | OUTPATIENT
Start: 2022-04-22 | End: 2022-04-22

## 2022-04-22 RX ORDER — SODIUM CHLORIDE 9 MG/ML
1000 INJECTION, SOLUTION INTRAVENOUS
Refills: 0 | Status: DISCONTINUED | OUTPATIENT
Start: 2022-04-22 | End: 2022-04-22

## 2022-04-22 RX ORDER — SODIUM CHLORIDE 9 MG/ML
1000 INJECTION INTRAMUSCULAR; INTRAVENOUS; SUBCUTANEOUS ONCE
Refills: 0 | Status: DISCONTINUED | OUTPATIENT
Start: 2022-04-22 | End: 2022-04-22

## 2022-04-22 RX ORDER — ENOXAPARIN SODIUM 100 MG/ML
40 INJECTION SUBCUTANEOUS EVERY 24 HOURS
Refills: 0 | Status: DISCONTINUED | OUTPATIENT
Start: 2022-04-22 | End: 2022-04-23

## 2022-04-22 RX ORDER — CEFAZOLIN SODIUM 1 G
1000 VIAL (EA) INJECTION ONCE
Refills: 0 | Status: COMPLETED | OUTPATIENT
Start: 2022-04-22 | End: 2022-04-23

## 2022-04-22 RX ORDER — OXYCODONE HYDROCHLORIDE 5 MG/1
5 TABLET ORAL ONCE
Refills: 0 | Status: DISCONTINUED | OUTPATIENT
Start: 2022-04-22 | End: 2022-04-22

## 2022-04-22 RX ORDER — CEFAZOLIN SODIUM 1 G
VIAL (EA) INJECTION
Refills: 0 | Status: DISCONTINUED | OUTPATIENT
Start: 2022-04-22 | End: 2022-04-22

## 2022-04-22 RX ORDER — PANTOPRAZOLE SODIUM 20 MG/1
40 TABLET, DELAYED RELEASE ORAL DAILY
Refills: 0 | Status: DISCONTINUED | OUTPATIENT
Start: 2022-04-22 | End: 2022-04-23

## 2022-04-22 RX ORDER — FENTANYL CITRATE 50 UG/ML
25 INJECTION INTRAVENOUS
Refills: 0 | Status: DISCONTINUED | OUTPATIENT
Start: 2022-04-22 | End: 2022-04-22

## 2022-04-22 RX ORDER — CEFAZOLIN SODIUM 1 G
VIAL (EA) INJECTION
Refills: 0 | Status: DISCONTINUED | OUTPATIENT
Start: 2022-04-23 | End: 2022-04-23

## 2022-04-22 RX ORDER — KETOROLAC TROMETHAMINE 30 MG/ML
30 SYRINGE (ML) INJECTION EVERY 8 HOURS
Refills: 0 | Status: DISCONTINUED | OUTPATIENT
Start: 2022-04-22 | End: 2022-04-23

## 2022-04-22 RX ORDER — CEFAZOLIN SODIUM 1 G
1000 VIAL (EA) INJECTION EVERY 8 HOURS
Refills: 0 | Status: DISCONTINUED | OUTPATIENT
Start: 2022-04-23 | End: 2022-04-23

## 2022-04-22 RX ORDER — ONDANSETRON 8 MG/1
4 TABLET, FILM COATED ORAL ONCE
Refills: 0 | Status: DISCONTINUED | OUTPATIENT
Start: 2022-04-22 | End: 2022-04-22

## 2022-04-22 RX ORDER — SODIUM CHLORIDE 9 MG/ML
1000 INJECTION, SOLUTION INTRAVENOUS ONCE
Refills: 0 | Status: COMPLETED | OUTPATIENT
Start: 2022-04-22 | End: 2022-04-22

## 2022-04-22 RX ORDER — LIDOCAINE 4 G/100G
1 CREAM TOPICAL ONCE
Refills: 0 | Status: COMPLETED | OUTPATIENT
Start: 2022-04-22 | End: 2022-04-22

## 2022-04-22 RX ORDER — SODIUM CHLORIDE 9 MG/ML
1000 INJECTION INTRAMUSCULAR; INTRAVENOUS; SUBCUTANEOUS ONCE
Refills: 0 | Status: COMPLETED | OUTPATIENT
Start: 2022-04-22 | End: 2022-04-22

## 2022-04-22 RX ORDER — MIDAZOLAM HYDROCHLORIDE 1 MG/ML
1 INJECTION, SOLUTION INTRAMUSCULAR; INTRAVENOUS ONCE
Refills: 0 | Status: DISCONTINUED | OUTPATIENT
Start: 2022-04-22 | End: 2022-04-22

## 2022-04-22 RX ADMIN — MORPHINE SULFATE 4 MILLIGRAM(S): 50 CAPSULE, EXTENDED RELEASE ORAL at 23:43

## 2022-04-22 RX ADMIN — SODIUM CHLORIDE 500 MILLILITER(S): 9 INJECTION, SOLUTION INTRAVENOUS at 20:22

## 2022-04-22 RX ADMIN — MORPHINE SULFATE 4 MILLIGRAM(S): 50 CAPSULE, EXTENDED RELEASE ORAL at 14:18

## 2022-04-22 RX ADMIN — SODIUM CHLORIDE 1000 MILLILITER(S): 9 INJECTION INTRAMUSCULAR; INTRAVENOUS; SUBCUTANEOUS at 14:48

## 2022-04-22 RX ADMIN — MIDAZOLAM HYDROCHLORIDE 1 MILLIGRAM(S): 1 INJECTION, SOLUTION INTRAMUSCULAR; INTRAVENOUS at 15:38

## 2022-04-22 RX ADMIN — LIDOCAINE 1 PATCH: 4 CREAM TOPICAL at 13:48

## 2022-04-22 RX ADMIN — FENTANYL CITRATE 25 MICROGRAM(S): 50 INJECTION INTRAVENOUS at 18:31

## 2022-04-22 RX ADMIN — SODIUM CHLORIDE 1000 MILLILITER(S): 9 INJECTION INTRAMUSCULAR; INTRAVENOUS; SUBCUTANEOUS at 13:48

## 2022-04-22 RX ADMIN — Medication 30 MILLIGRAM(S): at 18:30

## 2022-04-22 RX ADMIN — OXYCODONE HYDROCHLORIDE 5 MILLIGRAM(S): 5 TABLET ORAL at 18:31

## 2022-04-22 RX ADMIN — Medication 1 MILLIGRAM(S): at 15:23

## 2022-04-22 RX ADMIN — MORPHINE SULFATE 4 MILLIGRAM(S): 50 CAPSULE, EXTENDED RELEASE ORAL at 13:48

## 2022-04-22 RX ADMIN — ENOXAPARIN SODIUM 40 MILLIGRAM(S): 100 INJECTION SUBCUTANEOUS at 21:58

## 2022-04-22 RX ADMIN — PANTOPRAZOLE SODIUM 40 MILLIGRAM(S): 20 TABLET, DELAYED RELEASE ORAL at 21:58

## 2022-04-22 RX ADMIN — MORPHINE SULFATE 4 MILLIGRAM(S): 50 CAPSULE, EXTENDED RELEASE ORAL at 22:16

## 2022-04-22 NOTE — PATIENT PROFILE ADULT - FALL HARM RISK - UNIVERSAL INTERVENTIONS
Bed in lowest position, wheels locked, appropriate side rails in place/Call bell, personal items and telephone in reach/Instruct patient to call for assistance before getting out of bed or chair/Non-slip footwear when patient is out of bed/[place] to call system/Physically safe environment - no spills, clutter or unnecessary equipment/Purposeful Proactive Rounding/Room/bathroom lighting operational, light cord in reach

## 2022-04-22 NOTE — ED ADULT NURSE NOTE - DOES PATIENT HAVE ADVANCE DIRECTIVE
V2.0  [facility] Hospitalist Progress Note      Name:  Dyana Lee /Age/Sex: 1949  (74 y.o. male)   MRN & CSN:  [id number] & [id number] Encounter Date/Time: 2022 10:29 AM EDT    Location:  [address] [place] [address]- PCP: Kelly Dumas MD       Hospital Day: 3    Assessment and Plan:   Dyana Lee is a 74 y.o. male with pmh of hypertension, hyperlipidemia, prediabetes, hypothyroidism who presents with generalized weakness in the past 1 week. Patient was found UTI with white cell 26. And ANTHONY with creatinine level 1.8. Currently on IV antibiotics to Rocephin.     Plan:  Sepsis due to UTI (improving)  -Afebrile this morning  -White cell 26 in admission, white cell 14 today  -Lactic acid normal  -Blood culture and urine culture is pending  -On antibiotics Rocephin     Complicated UTI  -Improving fatigue, still having dysuria  -Completed 1 course of oral antibiotics from PCP (not able to tell me the name of antibiotics)  -UA showed small leukocyte and many bacteria, urine culture is pending  -Currently on Rocephin  -Appreciate urology input: We will obtain up PVR to ensure proper bladder emptying, continue IV antibiotics.     ANTHONY  -Creatinine level 1.8 in admission, no baseline to compare with  -Improving with IV fluids  -Pending BMP this morning    Hypertension  -Continue amlodipine 10 mg daily and metoprolol  mg daily  -Hold diuretics     Hyperlipidemia  -Continue statin and fenofibrate     Hypothyroidism  -TSH and T4 normal  -Continue home meds Synthroid 50 MCG     DVT prophylaxis  -Heparin     Dispo: Patient lives alone at home. He seems having impaired memory. Not sure whether he takes medication. PT OT evaluation,  consulted for discharge planning. Per  patient's daughter will assist patient as needed with transportation. Patient has PCP and insurance covers medications. Patient denied any needs at discharge. Will keep patient for another day due to persistent dysuria.   Continue current treatment. Hopefully patient will be discharge home in 1 to 2 days.       Diet ADULT DIET; Regular; Low Fat/Low Chol/High Fiber/MIRNA; No Added Salt (3-4 gm)  ADULT ORAL NUTRITION SUPPLEMENT; Breakfast, Dinner; Standard High Calorie/High Protein Oral Supplement   DVT Prophylaxis [x] Lovenox, []  Heparin, [] SCDs, [] Ambulation,  [] Eliquis, [] Xarelto  [] Coumadin   Code Status Full Code   Disposition From: Home  Expected Disposition: Home  Estimated Date of Discharge: 1 to 2 days  Patient requires continued admission due to medical condition   Surrogate Decision Maker/ POA      Subjective:     Chief Complaint: Fatigue (feels tired and \"has no energy\", states this has been going on for several weeks.)     Noah Godwin is a 74 y.o. male with history of hypertension, hyperlipidemia, hypothyroidism was admitted to hospital for UTI with sepsis and ANTHONY on April 2. Patient has been treated with antibiotics and IV fluids. Urology consulted. PT OT and  consulted. Review of Systems:    Review of Systems   Constitutional: Negative. HENT: Negative. Eyes: Negative. Respiratory: Negative. Cardiovascular: Negative. Gastrointestinal: Negative. Endocrine: Negative. Genitourinary: Positive for dysuria. Musculoskeletal: Negative. Skin: Negative. Allergic/Immunologic: Negative. Neurological: Negative. Hematological: Negative. Psychiatric/Behavioral: Negative. Objective: Intake/Output Summary (Last 24 hours) at 4/14/2022 1037  Last data filed at 4/14/2022 0630  Gross per 24 hour   Intake 2109.46 ml   Output 750 ml   Net 1359.46 ml        Vitals:   Vitals:    04/14/22 0815   BP: 129/66   Pulse: 69   Resp: 18   Temp: 98.2 °F (36.8 °C)   SpO2: 95%       Physical Exam:     General: NAD  Eyes: EOMI  ENT: neck supple  Cardiovascular: Regular rate.   Respiratory: Clear to auscultation  Gastrointestinal: Soft, non tender  Genitourinary: no suprapubic tenderness  Musculoskeletal: No edema  Skin: warm, dry  Neuro: Alert. Psych: Mood appropriate.      Medications:   Medications:    amLODIPine  10 mg Oral Daily    atorvastatin  10 mg Oral Daily    fenofibrate  160 mg Oral Daily    levothyroxine  50 mcg Oral Daily    metoprolol succinate  100 mg Oral Daily    cefTRIAXone (ROCEPHIN) IV  1,000 mg IntraVENous Q24H    sodium chloride flush  5-40 mL IntraVENous BID    heparin (porcine)  5,000 Units SubCUTAneous 3 times per day      Infusions:    sodium chloride       PRN Meds: sodium chloride flush, 5-40 mL, PRN  sodium chloride, , PRN  ondansetron, 4 mg, Q8H PRN   Or  ondansetron, 4 mg, Q6H PRN  acetaminophen, 650 mg, Q6H PRN   Or  acetaminophen, 650 mg, Q6H PRN  polyethylene glycol, 17 g, Daily PRN        Labs      Recent Results (from the past 24 hour(s))   CBC with Auto Differential    Collection Time: 04/14/22  9:21 AM   Result Value Ref Range    WBC 14.5 (H) 4.0 - 10.5 K/CU MM    RBC 4.02 (L) 4.6 - 6.2 M/CU MM    Hemoglobin 12.4 (L) 13.5 - 18.0 GM/DL    Hematocrit 37.8 (L) 42 - 52 %    MCV 94.0 78 - 100 FL    MCH 30.8 27 - 31 PG    MCHC 32.8 32.0 - 36.0 %    RDW 13.9 11.7 - 14.9 %    Platelets 691 414 - 354 K/CU MM    MPV 9.9 7.5 - 11.1 FL    Differential Type AUTOMATED DIFFERENTIAL     Segs Relative 76.3 (H) 36 - 66 %    Lymphocytes % 9.0 (L) 24 - 44 %    Monocytes % 12.7 (H) 0 - 4 %    Eosinophils % 0.9 0 - 3 %    Basophils % 0.3 0 - 1 %    Segs Absolute 11.1 K/CU MM    Lymphocytes Absolute 1.3 K/CU MM    Monocytes Absolute 1.9 K/CU MM    Eosinophils Absolute 0.1 K/CU MM    Basophils Absolute 0.0 K/CU MM    Nucleated RBC % 0.0 %    Total Nucleated RBC 0.0 K/CU MM    Total Immature Neutrophil 0.11 K/CU MM    Immature Neutrophil % 0.8 (H) 0 - 0.43 %        Imaging/Diagnostics Last 24 Hours   CT ABDOMEN PELVIS WO CONTRAST Additional Contrast? None    Result Date: 4/12/2022  EXAMINATION: CT OF THE ABDOMEN AND PELVIS WITHOUT CONTRAST; CT OF THE CHEST WITHOUT CONTRAST 4/12/2022 6:14 pm TECHNIQUE: CT of the abdomen and pelvis was performed without the administration of intravenous contrast. Multiplanar reformatted images are provided for review. Dose modulation, iterative reconstruction, and/or weight based adjustment of the mA/kV was utilized to reduce the radiation dose to as low as reasonably achievable.; CT of the chest was performed without the administration of intravenous contrast. Multiplanar reformatted images are provided for review. Dose modulation, iterative reconstruction, and/or weight based adjustment of the mA/kV was utilized to reduce the radiation dose to as low as reasonably achievable. COMPARISON: None. HISTORY: ORDERING SYSTEM PROVIDED HISTORY: white count 25,000, fatigue TECHNOLOGIST PROVIDED HISTORY: Reason for exam:-> white count 25 k, fatigue Additional Contrast?-> none Reason for Exam: white count 25 k, fatigue FINDINGS: Mediastinum: Cardiac structures and great vessels appear unremarkable with exception of calcific atherosclerotic disease. No pericardial effusion. Left hilar calcification typical of sequela from old granulomatous disease. Posterior mediastinal structures appear unremarkable. No mediastinal or hilar adenopathy. Lungs/pleura: Densely calcified nodules lateral mid left and right lung, typical of sequela from old granulomatous disease. Well-expanded and otherwise clear, without soft tissue or ground-glass pulmonary nodule. No pleural effusion or pneumothorax. No inspissated secretions or endobronchial lesion evident. Organs: Mild hepatic morphologic features typical of cirrhosis. The liver appears fatty and diffusely mildly heterogeneous. No discrete mass lesion evident. The gallbladder, spleen, pancreas, adrenal glands and kidneys appear unremarkable. GI/Bowel: No diffuse or focal bowel wall thickening evident. No inflammatory changes evident. No obstruction is seen. The normal appendix is seen right lower quadrant. Pelvis: Prostate gland and seminal vesicles appear unremarkable. Urinary bladder is partially filled, diffusely mild thick walled appearance. Mild pericystic inflammatory changes. No adenopathy or free fluid. Peritoneum/Retroperitoneum: Mild calcific atherosclerosis of the aorta, otherwise unremarkable appearance of the aorta with no aneurysm. Unremarkable appearance of the IVC. No adenopathy or fluid. Bones/Soft Tissues: No acute superficial soft tissue or osseous structure abnormality evident. 1.  CT CHEST: No acute abnormality. 2. Mild calcific atherosclerosis aorta and coronary arteries. 3. Otherwise unremarkable chest CT. No finding to suggest etiology for fatigue and elevated white blood cell count. 4. CT ABDOMEN/PELVIS: Acute cystitis suspected given the findings; correlate with urinalysis. 5.  No findings to suggest acute appendicitis; no ureter calculus or hydronephrosis. 6. Mild hepatic morphologic features suggestive of cirrhosis, though nonspecific. 7. Hepatic steatosis. 8.  Calcific atherosclerotic disease aorta. CT CHEST WO CONTRAST    Result Date: 4/12/2022  EXAMINATION: CT OF THE ABDOMEN AND PELVIS WITHOUT CONTRAST; CT OF THE CHEST WITHOUT CONTRAST 4/12/2022 6:14 pm TECHNIQUE: CT of the abdomen and pelvis was performed without the administration of intravenous contrast. Multiplanar reformatted images are provided for review. Dose modulation, iterative reconstruction, and/or weight based adjustment of the mA/kV was utilized to reduce the radiation dose to as low as reasonably achievable.; CT of the chest was performed without the administration of intravenous contrast. Multiplanar reformatted images are provided for review. Dose modulation, iterative reconstruction, and/or weight based adjustment of the mA/kV was utilized to reduce the radiation dose to as low as reasonably achievable. COMPARISON: None.  HISTORY: ORDERING SYSTEM PROVIDED HISTORY: white count 25,000, fatigue TECHNOLOGIST PROVIDED HISTORY: Reason for exam:-> white count 25 k, fatigue Additional Contrast?-> none Reason for Exam: white count 25 k, fatigue FINDINGS: Mediastinum: Cardiac structures and great vessels appear unremarkable with exception of calcific atherosclerotic disease. No pericardial effusion. Left hilar calcification typical of sequela from old granulomatous disease. Posterior mediastinal structures appear unremarkable. No mediastinal or hilar adenopathy. Lungs/pleura: Densely calcified nodules lateral mid left and right lung, typical of sequela from old granulomatous disease. Well-expanded and otherwise clear, without soft tissue or ground-glass pulmonary nodule. No pleural effusion or pneumothorax. No inspissated secretions or endobronchial lesion evident. Organs: Mild hepatic morphologic features typical of cirrhosis. The liver appears fatty and diffusely mildly heterogeneous. No discrete mass lesion evident. The gallbladder, spleen, pancreas, adrenal glands and kidneys appear unremarkable. GI/Bowel: No diffuse or focal bowel wall thickening evident. No inflammatory changes evident. No obstruction is seen. The normal appendix is seen right lower quadrant. Pelvis: Prostate gland and seminal vesicles appear unremarkable. Urinary bladder is partially filled, diffusely mild thick walled appearance. Mild pericystic inflammatory changes. No adenopathy or free fluid. Peritoneum/Retroperitoneum: Mild calcific atherosclerosis of the aorta, otherwise unremarkable appearance of the aorta with no aneurysm. Unremarkable appearance of the IVC. No adenopathy or fluid. Bones/Soft Tissues: No acute superficial soft tissue or osseous structure abnormality evident. 1.  CT CHEST: No acute abnormality. 2. Mild calcific atherosclerosis aorta and coronary arteries. 3. Otherwise unremarkable chest CT. No finding to suggest etiology for fatigue and elevated white blood cell count.  4. CT ABDOMEN/PELVIS: Acute cystitis suspected given the findings; correlate with urinalysis. 5.  No findings to suggest acute appendicitis; no ureter calculus or hydronephrosis. 6. Mild hepatic morphologic features suggestive of cirrhosis, though nonspecific. 7. Hepatic steatosis. 8.  Calcific atherosclerotic disease aorta. XR CHEST PORTABLE    Result Date: 4/12/2022  EXAMINATION: ONE XRAY VIEW OF THE CHEST 4/12/2022 4:42 pm COMPARISON: CT chest, abdomen, and pelvis same day HISTORY: ORDERING SYSTEM PROVIDED HISTORY: fatigued, easily SOB with exertion TECHNOLOGIST PROVIDED HISTORY: Reason for exam:->fatigued, easily SOB with exertion Reason for Exam: fatigued, easily SOB with exertion FINDINGS: The lungs are without acute focal process. There is no effusion or pneumothorax. The cardiomediastinal silhouette is without acute process. The osseous structures are without acute process. No acute process.        Electronically signed by Natividad Chester CNP on 4/14/2022 at 10:37 AM No

## 2022-04-22 NOTE — ED ADULT NURSE REASSESSMENT NOTE - NS ED NURSE REASSESS COMMENT FT1
belongings brought to security and then taken out and given to patient's wife with patient's permission to bring belongings home.

## 2022-04-22 NOTE — ED PROVIDER NOTE - OBJECTIVE STATEMENT
52 y/o male with a PMHx of chronic back pain, chronic liver failure on Xifaxin, GERD, HTN, lyme disease, RA, sleep apnea, umbilical hernia presents to the ED c/o RUQ pain, right rib pain and lightheadedness. Pain worse with breathing deeply. Pt s/p liver biopsy on 4/19. Denies hematemesis, black/bloody stools. Nonsmoker. No other complaints at this time.

## 2022-04-22 NOTE — H&P ADULT - ASSESSMENT
51 M w/ retained foreign body s/p IR guided liver biopsy    Plan:  -admit to Dr. Brizuela  -pre-op labs for OR   -added on for retrieval of FB  -NPO  -IVF  -pain control  -anti-emetics  -DVT/GI ppx    Plan discussed with Dr. Brizuela

## 2022-04-22 NOTE — H&P ADULT - NSHPLABSRESULTS_GEN_ALL_CORE
LABS:                        13.3   8.56  )-----------( 74       ( 22 Apr 2022 12:38 )             39.3     22 Apr 2022 12:38    134    |  99     |  45     ----------------------------<  192    3.9     |  27     |  3.34     Ca    9.7        22 Apr 2022 12:38    TPro  8.5    /  Alb  3.9    /  TBili  2.0    /  DBili  x      /  AST  44     /  ALT  54     /  AlkPhos  95     22 Apr 2022 12:38    PT/INR - ( 22 Apr 2022 12:38 )   PT: 15.2 sec;   INR: 1.31 ratio         PTT - ( 22 Apr 2022 12:38 )  PTT:36.6 sec    Vital Signs Last 24 Hrs  T(C): 36.8 (22 Apr 2022 15:16), Max: 37.1 (22 Apr 2022 12:38)  T(F): 98.2 (22 Apr 2022 15:16), Max: 98.7 (22 Apr 2022 12:38)  HR: 88 (22 Apr 2022 15:16) (80 - 88)  BP: 105/62 (22 Apr 2022 15:16) (105/62 - 126/64)  BP(mean): 76 (22 Apr 2022 15:16) (76 - 76)  RR: 18 (22 Apr 2022 15:16) (18 - 20)  SpO2: 95% (22 Apr 2022 15:16) (91% - 95%)    < from: CT Abdomen and Pelvis w/ IV Cont (04.22.22 @ 13:25) >    IMPRESSION:  1. Catheter fragment extending in the right lateral abdominal wall and   intercostal musculature.  2. Status post right inferior liver biopsy. No evidence of perihepatic   hematoma or active bleeding.  3. No evidence of pulmonary embolism.    < end of copied text >

## 2022-04-22 NOTE — H&P ADULT - NSHPREVIEWOFSYSTEMS_GEN_ALL_CORE
· Gastrointestinal [+]: ABDOMINAL PAIN  · Musculoskeletal [+]: right rib pain  · Neurological [+]: lightheadedness

## 2022-04-22 NOTE — ED ADULT TRIAGE NOTE - CHIEF COMPLAINT QUOTE
pt presents to ED c/o right sided abd pain. Was admitted to  x1 week, dc on wednesday. During hospitalization pt had liver biopsy.  Denies NVD.

## 2022-04-22 NOTE — ED ADULT NURSE NOTE - NSIMPLEMENTINTERV_GEN_ALL_ED
Implemented All Universal Safety Interventions:  [place] to call system. Call bell, personal items and telephone within reach. Instruct patient to call for assistance. Room bathroom lighting operational. Non-slip footwear when patient is off stretcher. Physically safe environment: no spills, clutter or unnecessary equipment. Stretcher in lowest position, wheels locked, appropriate side rails in place.

## 2022-04-22 NOTE — H&P ADULT - NSHPPHYSICALEXAM_GEN_ALL_CORE
· CONSTITUTIONAL: Well appearing, awake, alert, oriented to person, place, time/situation and in no apparent distress.  · ENMT: Airway patent, Nasal mucosa clear. Mouth with normal mucosa. Throat has no vesicles, no oropharyngeal exudates and uvula is midline.  · EYES: Clear bilaterally, pupils equal, round and reactive to light.  · CARDIAC: Normal rate, regular rhythm.  Heart sounds S1, S2.  No murmurs, rubs or gallops.  · RESPIRATORY: Breath sounds clear and equal bilaterally.  · GASTROINTESTINAL: Abdomen soft no guarding. RUQ TTP.  · MUSCULOSKELETAL: Spine appears normal, range of motion is not limited. Right mid back tenderness.  · NEUROLOGICAL: Alert and oriented, no focal deficits, no motor or sensory deficits.  · SKIN: Skin normal color for race, warm, dry and intact. No evidence of rash.

## 2022-04-22 NOTE — ED ADULT NURSE NOTE - OBJECTIVE STATEMENT
Patient C/o RUQ pain s/p Liver biopsy, d/c from  wednesday. C/o Nausea as well. having normal BM and urination. No CP, SOB. Patient oxygen saturation between 89 and 92%. Dr Brand aware, CTA chest to rule out PE in progress.

## 2022-04-22 NOTE — H&P ADULT - DOES THIS PATIENT HAVE A HISTORY OF OR HAS BEEN DX WITH HEART FAILURE?
Smith Baron  : 1969  Primary: Toy Peña [employer] Medicare Complete  Secondary:  [address], [place].  [phone]   [phone]      OUTPATIENT PHYSICAL THERAPY: Daily Treatment Note  3/3/2021   Pain in right hip (M25.551) and Stiffness of right hip, not elsewhere classified (M25.651), Low back pain (M54.5) and Stiffness of unspecified joint, not elsewhere classified (M25.60) and Muscle weakness (generalized) (M62.81) and Other abnormalities of gait and mobility (R26.89)     R ENRRIQUE 21 do not push motion  Effective Dates: 2021 TO 2021 (90 days). Frequency/Duration: 2-3 times a week for 90 Days  GOALS: (Goals have been discussed and agreed upon with patient.)  Short-Term Functional Goals: Time Frame: 4 weeks  1. Pt to report compliance with HEP  2. Pt to increase quad strength to consistently stand with knee extended. 3. Pt to increase flexibility to restore erect stance posture. 4. Pt to increase strength to amb level surfaces without assistive device. Discharge Goals: Time Frame: 12 weeks  1. Pt to increase strength for reciprocal gait on stairs  2. Pt to increase SLS > 10 sec for safe amb all surfaces  _______________________________________________________________________________  Pre-treatment Symptoms/Complaints: I guess I am doing pretty good. The doctor said he is leaving it up to you when I can drive and he said he is leaving me in therapy 6 months.   Pain: Initial: 3/10 Post Session: real tired but feels okay   Medications Last Reviewed:  3/3/2021    Updated Objective Findings:      Observation/Orthostatic Postural Assessment:          Trunk flex, hip flex, knee flexed posture in stance;  Supine must keep R hip flexed due to contracture, back arches and SB R  Palpation:          Spasm and tenderness R hip flex, quad  ROM:         AROM  Hip flex L 0-96, R 43-80  Strength:         2/5 R hip flex, 5/5 knee ext; Unable to assess B hip abd, hip ext;  L LE 5/5 hip flex, knee ext. Functional Mobility:         Gait/Ambulation:  Antalgic with flexed posture using rolling walker        Transfers:  Keeps R LE out in front, pushes with UE's        Bed Mobility:  Holds breath and uses UE's exclusively for position changes        Stairs:  Avoids   Balance:          Unable to perform currently    Tool Used: Lower Extremity Functional Scale (LEFS)  Score:  Initial: 12/80 Most Recent: X/80 (Date: -- )   Interpretation of Score: 20 questions each scored on a 5 point scale with 0 representing \"extreme difficulty or unable to perform\" and 4 representing \"no difficulty\". The lower the score, the greater the functional disability. 80/80 represents no disability. Minimal detectable change is 9 points. TREATMENT:   THERAPEUTIC ACTIVITY: ( see below for minutes): Therapeutic activities per grid below to improve mobility. Required moderate verbal and manual cues to improve functional mobility . THERAPEUTIC EXERCISE: (see below for minutes):  Exercises per grid below to improve strength. Required moderate verbal and manual cues to promote proper body alignment, promote proper body posture, promote proper body mechanics and promote proper body breathing techniques. Progressed resistance, range, repetitions and complexity of movement as indicated. MANUAL THERAPY: (see below for minutes): Joint mobilization and Soft tissue mobilization was utilized and necessary because of the patient's restricted joint motion, painful spasm, loss of articular motion and restricted motion of soft tissue. MODALITIES: (see below for minutes):      for pain modulation    AQUATIC THERAPY (see below for minutes):  Aquatic treatment performed per flow grid for Decreased muscle strength, Decreased endurance, Decreased static/dynamic balance and reactive control, Decreased activity endurance, Decompression, Ease of movement and Low impact and reduced weight bearing activity. Date: 2/25/21 3/3/21  Visit 2         Modalities: 15 mins          Game ready R thigh seated                     Manual Therapy: 10 mins          STM R hip flex, quad  supine with support under knee                     Aquatics Activities:  45 mins         GAIT (F/S)  3 laps         Deep well  10 mins                                          Therapeutic Exercises: 20 mins 10 mins         Pt education, postural education, HEP, functional breathing, bed mobility 10 mins          Quad set R X 10          Passive stretch hip flex/knee ext supine In L S/L         LAQ R X 20          Standing with knee/hip ext  In front of mirror                     HEP: LAQ, erect stance engaging quads, current HEP from [address] [place] [address] Portal  Treatment/Session Summary:    · Response to Treatment:  Pt fatigues very quickly and was very slow with ex's. Emphasized not hopping off R LE but holding hip and knee in extension with L swing phase. Pt has L pelvic drop and step-to with L due to weakness on R. He has marked rigid lumbar lordosis in all postures. Some relaxation realized with noodle hang. Passive stretching in S/L done to avoid back pain due to lordosis that does not relax. Pt prefers twice/week due to copay so he will make the necessary schedule changes. · Communication/Consultation:  None today  · Equipment provided today:  None today  · Recommendations/Intent for next treatment session: Next visit will focus on strengthening, balance, gait, hip flex/quad flexibility as indicated.     Total Treatment Billable Duration:  55 mins  PT Patient Time In/Time Out  Time In: 0215  Time Out: 0315    Vanessa Ponce PT    Future Appointments   Date Time Provider Lisha Villagomez   3/5/2021  9:30 AM Calista Holloway PT [facility]   3/8/2021  2:15 PM Sarah Hensley PTA [facility]   3/10/2021  2:15 PM Sarah Hensley PTA [facility]   3/12/2021  8:00 AM Calista Holloway PT [facility] 3/15/2021  2:15 PM AydenShankarCira FLOR, PTA [facility]   3/17/2021  8:00 AM Amie, Bernell Mech, PT [facility]   3/19/2021  2:15 PM Kimi Hensley, PTA [facility]   3/22/2021  8:00 AM Amie, Bernell Mech, PT [facility]   3/24/2021  8:00 AM [place], Bernell Mech, PT [facility]   3/26/2021  8:00 AM [place], Bernell Mech, PT [facility]   3/29/2021  8:00 AM [place], Iamell Mech, PT [facility]   3/31/2021  8:00 AM Amie, Bernell Mech, PT [facility]   4/13/2021 10:00 AM Ino Pate NP [facility]   8/2/2021  1:20 PM MD PATY Ramires no

## 2022-04-22 NOTE — ED PROVIDER NOTE - PROGRESS NOTE DETAILS
Luh CAZARES: Patient found to have retained foreign body s/p liver biopsy; SURGERY consulted at this time. S/o to Dr. Helm pending surgery consult.

## 2022-04-23 ENCOUNTER — TRANSCRIPTION ENCOUNTER (OUTPATIENT)
Age: 52
End: 2022-04-23

## 2022-04-23 VITALS
SYSTOLIC BLOOD PRESSURE: 130 MMHG | RESPIRATION RATE: 18 BRPM | OXYGEN SATURATION: 95 % | TEMPERATURE: 98 F | DIASTOLIC BLOOD PRESSURE: 68 MMHG | HEART RATE: 99 BPM

## 2022-04-23 LAB
ANION GAP SERPL CALC-SCNC: 7 MMOL/L — SIGNIFICANT CHANGE UP (ref 5–17)
BUN SERPL-MCNC: 36 MG/DL — HIGH (ref 7–23)
CALCIUM SERPL-MCNC: 9.1 MG/DL — SIGNIFICANT CHANGE UP (ref 8.5–10.1)
CHLORIDE SERPL-SCNC: 106 MMOL/L — SIGNIFICANT CHANGE UP (ref 96–108)
CO2 SERPL-SCNC: 24 MMOL/L — SIGNIFICANT CHANGE UP (ref 22–31)
CREAT SERPL-MCNC: 2.11 MG/DL — HIGH (ref 0.5–1.3)
EGFR: 37 ML/MIN/1.73M2 — LOW
GLUCOSE SERPL-MCNC: 218 MG/DL — HIGH (ref 70–99)
HCT VFR BLD CALC: 35.3 % — LOW (ref 39–50)
HGB BLD-MCNC: 11.8 G/DL — LOW (ref 13–17)
MAGNESIUM SERPL-MCNC: 2.2 MG/DL — SIGNIFICANT CHANGE UP (ref 1.6–2.6)
MCHC RBC-ENTMCNC: 33.4 GM/DL — SIGNIFICANT CHANGE UP (ref 32–36)
MCHC RBC-ENTMCNC: 33.5 PG — SIGNIFICANT CHANGE UP (ref 27–34)
MCV RBC AUTO: 100.3 FL — HIGH (ref 80–100)
PHOSPHATE SERPL-MCNC: 3.5 MG/DL — SIGNIFICANT CHANGE UP (ref 2.5–4.5)
PLATELET # BLD AUTO: 68 K/UL — LOW (ref 150–400)
POTASSIUM SERPL-MCNC: 4.7 MMOL/L — SIGNIFICANT CHANGE UP (ref 3.5–5.3)
POTASSIUM SERPL-SCNC: 4.7 MMOL/L — SIGNIFICANT CHANGE UP (ref 3.5–5.3)
RBC # BLD: 3.52 M/UL — LOW (ref 4.2–5.8)
RBC # FLD: 12.5 % — SIGNIFICANT CHANGE UP (ref 10.3–14.5)
SODIUM SERPL-SCNC: 137 MMOL/L — SIGNIFICANT CHANGE UP (ref 135–145)
WBC # BLD: 6.58 K/UL — SIGNIFICANT CHANGE UP (ref 3.8–10.5)
WBC # FLD AUTO: 6.58 K/UL — SIGNIFICANT CHANGE UP (ref 3.8–10.5)

## 2022-04-23 RX ORDER — OXYCODONE HYDROCHLORIDE 5 MG/1
1 TABLET ORAL
Qty: 18 | Refills: 0
Start: 2022-04-23 | End: 2022-04-25

## 2022-04-23 RX ORDER — OXYCODONE HYDROCHLORIDE 5 MG/1
5 TABLET ORAL ONCE
Refills: 0 | Status: DISCONTINUED | OUTPATIENT
Start: 2022-04-23 | End: 2022-04-23

## 2022-04-23 RX ORDER — OXYCODONE HYDROCHLORIDE 5 MG/1
15 TABLET ORAL ONCE
Refills: 0 | Status: DISCONTINUED | OUTPATIENT
Start: 2022-04-23 | End: 2022-04-23

## 2022-04-23 RX ORDER — OXYCODONE HYDROCHLORIDE 5 MG/1
1 TABLET ORAL
Qty: 0 | Refills: 0 | DISCHARGE

## 2022-04-23 RX ORDER — OXYCODONE HYDROCHLORIDE 5 MG/1
10 TABLET ORAL ONCE
Refills: 0 | Status: DISCONTINUED | OUTPATIENT
Start: 2022-04-23 | End: 2022-04-23

## 2022-04-23 RX ADMIN — OXYCODONE HYDROCHLORIDE 15 MILLIGRAM(S): 5 TABLET ORAL at 11:19

## 2022-04-23 RX ADMIN — OXYCODONE HYDROCHLORIDE 5 MILLIGRAM(S): 5 TABLET ORAL at 06:44

## 2022-04-23 RX ADMIN — LIDOCAINE 1 PATCH: 4 CREAM TOPICAL at 01:14

## 2022-04-23 RX ADMIN — OXYCODONE HYDROCHLORIDE 15 MILLIGRAM(S): 5 TABLET ORAL at 12:15

## 2022-04-23 RX ADMIN — PANTOPRAZOLE SODIUM 40 MILLIGRAM(S): 20 TABLET, DELAYED RELEASE ORAL at 09:27

## 2022-04-23 RX ADMIN — Medication 1000 MILLIGRAM(S): at 09:27

## 2022-04-23 RX ADMIN — OXYCODONE HYDROCHLORIDE 10 MILLIGRAM(S): 5 TABLET ORAL at 06:58

## 2022-04-23 RX ADMIN — Medication 1000 MILLIGRAM(S): at 01:12

## 2022-04-23 NOTE — DISCHARGE NOTE PROVIDER - CARE PROVIDER_API CALL
Joaquin Brizuela)  Surgery  [address], [address]  [place] [address]  Phone: [phone]  Fax: [phone]  Follow Up Time: 2 weeks

## 2022-04-23 NOTE — DISCHARGE NOTE PROVIDER - HOSPITAL COURSE
A 51 year ld male with a history of liver cirrhosis  S/P IR guided liver biopsy and was found to have a retained foreign body  He was admitted from the ED for exploration and removal of the foreign body  Surgery was uneventful  He was observed in the hospital for 24 hours and a repeat imaging did not show evidence of retained foreign bodies  He was tolerating diet, pain was controlled and he was ready to go home

## 2022-04-23 NOTE — DISCHARGE NOTE NURSING/CASE MANAGEMENT/SOCIAL WORK - PATIENT PORTAL LINK FT
You can access the FollowMyHealth Patient Portal offered by [facility] by registering at the following website: [email or web address]. By joining [facility]’s FollowMyHealth portal, you will also be able to view your health information using other applications (apps) compatible with our system.

## 2022-04-23 NOTE — PROGRESS NOTE ADULT - ASSESSMENT
51 M w/ retained foreign body s/p IR guided liver biopsy now POD#1 s/p retrieval of FB.     Plan:  -regular diet  -24 hrs of ancef  -pain control  -anti-emetics  -DVT/GI ppx  -d/c home today    Plan discussed with Dr. Brizuela

## 2022-04-23 NOTE — DISCHARGE NOTE NURSING/CASE MANAGEMENT/SOCIAL WORK - NSDCPEFALRISK_GEN_ALL_CORE
For information on Fall & Injury Prevention, visit: [email or web address] OR  [email or web address] OR  https://www.cdc.gov/steadi/patient.html

## 2022-04-23 NOTE — PROGRESS NOTE ADULT - SUBJECTIVE AND OBJECTIVE BOX
Pt. seen and examined bedside in post-op period. C/o 7/10 right flank pain.    Vital Signs Last 24 Hrs  T(C): 36.5 (22 Apr 2022 19:56), Max: 37.1 (22 Apr 2022 12:38)  T(F): 97.7 (22 Apr 2022 19:56), Max: 98.7 (22 Apr 2022 12:38)  HR: 80 (22 Apr 2022 19:56) (74 - 88)  BP: 90/48 (22 Apr 2022 19:56) (82/45 - 126/64)  BP(mean): 78 (22 Apr 2022 16:00) (76 - 78)  RR: 18 (22 Apr 2022 19:56) (12 - 20)  SpO2: 95% (22 Apr 2022 19:56) (91% - 96%)    LABS:                        13.3   8.56  )-----------( 74       ( 22 Apr 2022 12:38 )             39.3     22 Apr 2022 12:38    134    |  99     |  45     ----------------------------<  192    3.9     |  27     |  3.34     Ca    9.7        22 Apr 2022 12:38    TPro  8.5    /  Alb  3.9    /  TBili  2.0    /  DBili  x      /  AST  44     /  ALT  54     /  AlkPhos  95     22 Apr 2022 12:38    PT/INR - ( 22 Apr 2022 12:38 )   PT: 15.2 sec;   INR: 1.31 ratio         PTT - ( 22 Apr 2022 12:38 )  PTT:36.6 sec    MEDICATIONS  (STANDING):  ceFAZolin  Injectable.      ceFAZolin  Injectable. 1000 milliGRAM(s) IV Push once  ceFAZolin  Injectable. 1000 milliGRAM(s) IV Push every 8 hours  enoxaparin Injectable 40 milliGRAM(s) SubCutaneous every 24 hours  pantoprazole  Injectable 40 milliGRAM(s) IV Push daily    MEDICATIONS  (PRN):  ketorolac   Injectable 30 milliGRAM(s) IV Push every 8 hours PRN Moderate Pain (4 - 6)  
Post Op Day#: 1  Procedure:  Removal of retained foreign body    The patient is complains of some pain in the area, but improved from yesterday    Vital Signs Last 24 Hrs  T(C): 36.3 (23 Apr 2022 09:26), Max: 37.1 (22 Apr 2022 12:38)  T(F): 97.4 (23 Apr 2022 09:26), Max: 98.7 (22 Apr 2022 12:38)  HR: 98 (23 Apr 2022 09:26) (74 - 98)  BP: 141/65 (23 Apr 2022 09:26) (82/45 - 141/65)  BP(mean): 78 (22 Apr 2022 16:00) (76 - 78)  RR: 18 (23 Apr 2022 09:26) (12 - 20)  SpO2: 96% (23 Apr 2022 09:26) (91% - 98%)    PHYSICAL EXAM:  Wounds: clean dry and intact, some ecchymosis of the area  abdomen soft nontender.                          11.8   6.58  )-----------( 68       ( 23 Apr 2022 08:24 )             35.3       04-23    137  |  106  |  36<H>  ----------------------------<  218<H>  4.7   |  24  |  2.11<H>    Ca    9.1      23 Apr 2022 08:24  Phos  3.5     04-23  Mg     2.2     04-23    TPro  8.5<H>  /  Alb  3.9  /  TBili  2.0<H>  /  DBili  x   /  AST  44<H>  /  ALT  54  /  AlkPhos  95  04-22      PT/INR - ( 22 Apr 2022 12:38 )   PT: 15.2 sec;   INR: 1.31 ratio         PTT - ( 22 Apr 2022 12:38 )  PTT:36.6 sec

## 2022-04-23 NOTE — DISCHARGE NOTE PROVIDER - NSDCCPCAREPLAN_GEN_ALL_CORE_FT
PRINCIPAL DISCHARGE DIAGNOSIS  Diagnosis: Foreign body in subcutaneous tissue  Assessment and Plan of Treatment:

## 2022-04-25 ENCOUNTER — INPATIENT (INPATIENT)
Facility: HOSPITAL | Age: 52
LOS: 7 days | Discharge: NURSING, REHAB OR LONG-TERM CARE | DRG: 432 | End: 2022-05-03
Attending: FAMILY MEDICINE | Admitting: INTERNAL MEDICINE
Payer: COMMERCIAL

## 2022-04-25 VITALS — WEIGHT: 315 LBS | HEIGHT: 76 IN

## 2022-04-25 DIAGNOSIS — K74.60 UNSPECIFIED CIRRHOSIS OF LIVER: ICD-10-CM

## 2022-04-25 DIAGNOSIS — Z98.1 ARTHRODESIS STATUS: Chronic | ICD-10-CM

## 2022-04-25 DIAGNOSIS — Z96.651 PRESENCE OF RIGHT ARTIFICIAL KNEE JOINT: Chronic | ICD-10-CM

## 2022-04-25 LAB
ALBUMIN SERPL ELPH-MCNC: 3.6 G/DL — SIGNIFICANT CHANGE UP (ref 3.3–5)
ALP SERPL-CCNC: 90 U/L — SIGNIFICANT CHANGE UP (ref 40–120)
ALT FLD-CCNC: 35 U/L — SIGNIFICANT CHANGE UP (ref 12–78)
AMMONIA BLD-MCNC: 79 UMOL/L — HIGH (ref 11–32)
ANION GAP SERPL CALC-SCNC: 5 MMOL/L — SIGNIFICANT CHANGE UP (ref 5–17)
APPEARANCE UR: CLEAR — SIGNIFICANT CHANGE UP
APPEARANCE UR: CLEAR — SIGNIFICANT CHANGE UP
APTT BLD: 37.3 SEC — HIGH (ref 27.5–35.5)
AST SERPL-CCNC: 35 U/L — SIGNIFICANT CHANGE UP (ref 15–37)
BASE EXCESS BLDV CALC-SCNC: 1.4 MMOL/L — SIGNIFICANT CHANGE UP
BILIRUB SERPL-MCNC: 1.7 MG/DL — HIGH (ref 0.2–1.2)
BILIRUB UR-MCNC: NEGATIVE — SIGNIFICANT CHANGE UP
BILIRUB UR-MCNC: NEGATIVE — SIGNIFICANT CHANGE UP
BUN SERPL-MCNC: 33 MG/DL — HIGH (ref 7–23)
CALCIUM SERPL-MCNC: 9.5 MG/DL — SIGNIFICANT CHANGE UP (ref 8.5–10.1)
CHLORIDE SERPL-SCNC: 102 MMOL/L — SIGNIFICANT CHANGE UP (ref 96–108)
CO2 BLDV-SCNC: 30 MMOL/L — HIGH (ref 22–26)
CO2 SERPL-SCNC: 29 MMOL/L — SIGNIFICANT CHANGE UP (ref 22–31)
COLOR SPEC: YELLOW — SIGNIFICANT CHANGE UP
COLOR SPEC: YELLOW — SIGNIFICANT CHANGE UP
CREAT SERPL-MCNC: 1.98 MG/DL — HIGH (ref 0.5–1.3)
DIFF PNL FLD: NEGATIVE — SIGNIFICANT CHANGE UP
DIFF PNL FLD: NEGATIVE — SIGNIFICANT CHANGE UP
EGFR: 40 ML/MIN/1.73M2 — LOW
GAS PNL BLDV: SIGNIFICANT CHANGE UP
GLUCOSE SERPL-MCNC: 126 MG/DL — HIGH (ref 70–99)
GLUCOSE UR QL: NEGATIVE — SIGNIFICANT CHANGE UP
GLUCOSE UR QL: NEGATIVE — SIGNIFICANT CHANGE UP
HCO3 BLDV-SCNC: 28 MMOL/L — SIGNIFICANT CHANGE UP (ref 22–29)
INR BLD: 1.37 RATIO — HIGH (ref 0.88–1.16)
KETONES UR-MCNC: NEGATIVE — SIGNIFICANT CHANGE UP
KETONES UR-MCNC: NEGATIVE — SIGNIFICANT CHANGE UP
LACTATE SERPL-SCNC: 1.4 MMOL/L — SIGNIFICANT CHANGE UP (ref 0.7–2)
LEUKOCYTE ESTERASE UR-ACNC: NEGATIVE — SIGNIFICANT CHANGE UP
LEUKOCYTE ESTERASE UR-ACNC: NEGATIVE — SIGNIFICANT CHANGE UP
LIDOCAIN IGE QN: 191 U/L — SIGNIFICANT CHANGE UP (ref 73–393)
NITRITE UR-MCNC: NEGATIVE — SIGNIFICANT CHANGE UP
NITRITE UR-MCNC: NEGATIVE — SIGNIFICANT CHANGE UP
PCO2 BLDV: 52 MMHG — SIGNIFICANT CHANGE UP (ref 42–55)
PH BLDV: 7.34 — SIGNIFICANT CHANGE UP (ref 7.32–7.43)
PH UR: 6 — SIGNIFICANT CHANGE UP (ref 5–8)
PH UR: 6 — SIGNIFICANT CHANGE UP (ref 5–8)
PO2 BLDV: 172 MMHG — SIGNIFICANT CHANGE UP
POTASSIUM SERPL-MCNC: 4.6 MMOL/L — SIGNIFICANT CHANGE UP (ref 3.5–5.3)
POTASSIUM SERPL-SCNC: 4.6 MMOL/L — SIGNIFICANT CHANGE UP (ref 3.5–5.3)
PROT SERPL-MCNC: 7.8 GM/DL — SIGNIFICANT CHANGE UP (ref 6–8.3)
PROT UR-MCNC: NEGATIVE — SIGNIFICANT CHANGE UP
PROT UR-MCNC: NEGATIVE — SIGNIFICANT CHANGE UP
PROTHROM AB SERPL-ACNC: 15.9 SEC — HIGH (ref 10.5–13.4)
RAPID RVP RESULT: SIGNIFICANT CHANGE UP
SAO2 % BLDV: 99.8 % — SIGNIFICANT CHANGE UP
SARS-COV-2 RNA SPEC QL NAA+PROBE: SIGNIFICANT CHANGE UP
SODIUM SERPL-SCNC: 136 MMOL/L — SIGNIFICANT CHANGE UP (ref 135–145)
SP GR SPEC: 1 — LOW (ref 1.01–1.02)
SP GR SPEC: 1.01 — SIGNIFICANT CHANGE UP (ref 1.01–1.02)
UROBILINOGEN FLD QL: NEGATIVE — SIGNIFICANT CHANGE UP
UROBILINOGEN FLD QL: NEGATIVE — SIGNIFICANT CHANGE UP

## 2022-04-25 PROCEDURE — 85610 PROTHROMBIN TIME: CPT

## 2022-04-25 PROCEDURE — 99232 SBSQ HOSP IP/OBS MODERATE 35: CPT

## 2022-04-25 PROCEDURE — 87449 NOS EACH ORGANISM AG IA: CPT

## 2022-04-25 PROCEDURE — 93010 ELECTROCARDIOGRAM REPORT: CPT

## 2022-04-25 PROCEDURE — 71045 X-RAY EXAM CHEST 1 VIEW: CPT | Mod: 26

## 2022-04-25 PROCEDURE — 85025 COMPLETE CBC W/AUTO DIFF WBC: CPT

## 2022-04-25 PROCEDURE — 80061 LIPID PANEL: CPT

## 2022-04-25 PROCEDURE — 84100 ASSAY OF PHOSPHORUS: CPT

## 2022-04-25 PROCEDURE — 82140 ASSAY OF AMMONIA: CPT

## 2022-04-25 PROCEDURE — 84436 ASSAY OF TOTAL THYROXINE: CPT

## 2022-04-25 PROCEDURE — 80048 BASIC METABOLIC PNL TOTAL CA: CPT

## 2022-04-25 PROCEDURE — 80053 COMPREHEN METABOLIC PANEL: CPT

## 2022-04-25 PROCEDURE — 83036 HEMOGLOBIN GLYCOSYLATED A1C: CPT

## 2022-04-25 PROCEDURE — 83735 ASSAY OF MAGNESIUM: CPT

## 2022-04-25 PROCEDURE — 99285 EMERGENCY DEPT VISIT HI MDM: CPT

## 2022-04-25 PROCEDURE — 36415 COLL VENOUS BLD VENIPUNCTURE: CPT

## 2022-04-25 PROCEDURE — 85730 THROMBOPLASTIN TIME PARTIAL: CPT

## 2022-04-25 PROCEDURE — 97162 PT EVAL MOD COMPLEX 30 MIN: CPT | Mod: GP

## 2022-04-25 PROCEDURE — 84145 PROCALCITONIN (PCT): CPT

## 2022-04-25 PROCEDURE — 71250 CT THORAX DX C-: CPT | Mod: 26,MD

## 2022-04-25 PROCEDURE — 82248 BILIRUBIN DIRECT: CPT

## 2022-04-25 PROCEDURE — 81003 URINALYSIS AUTO W/O SCOPE: CPT

## 2022-04-25 PROCEDURE — 74176 CT ABD & PELVIS W/O CONTRAST: CPT | Mod: 26,MD

## 2022-04-25 PROCEDURE — 87635 SARS-COV-2 COVID-19 AMP PRB: CPT

## 2022-04-25 PROCEDURE — 70450 CT HEAD/BRAIN W/O DYE: CPT | Mod: 26,MA

## 2022-04-25 PROCEDURE — 82107 ALPHA-FETOPROTEIN L3: CPT

## 2022-04-25 PROCEDURE — 87086 URINE CULTURE/COLONY COUNT: CPT

## 2022-04-25 PROCEDURE — 84443 ASSAY THYROID STIM HORMONE: CPT

## 2022-04-25 PROCEDURE — 83605 ASSAY OF LACTIC ACID: CPT

## 2022-04-25 PROCEDURE — 85027 COMPLETE CBC AUTOMATED: CPT

## 2022-04-25 PROCEDURE — 82962 GLUCOSE BLOOD TEST: CPT

## 2022-04-25 PROCEDURE — 97116 GAIT TRAINING THERAPY: CPT | Mod: GP

## 2022-04-25 RX ORDER — MIDODRINE HYDROCHLORIDE 2.5 MG/1
10 TABLET ORAL EVERY 8 HOURS
Refills: 0 | Status: DISCONTINUED | OUTPATIENT
Start: 2022-04-25 | End: 2022-04-26

## 2022-04-25 RX ORDER — FENTANYL CITRATE 50 UG/ML
25 INJECTION INTRAVENOUS ONCE
Refills: 0 | Status: DISCONTINUED | OUTPATIENT
Start: 2022-04-25 | End: 2022-04-25

## 2022-04-25 RX ORDER — HYDROMORPHONE HYDROCHLORIDE 2 MG/ML
0.5 INJECTION INTRAMUSCULAR; INTRAVENOUS; SUBCUTANEOUS ONCE
Refills: 0 | Status: DISCONTINUED | OUTPATIENT
Start: 2022-04-25 | End: 2022-04-25

## 2022-04-25 RX ORDER — SODIUM CHLORIDE 9 MG/ML
500 INJECTION INTRAMUSCULAR; INTRAVENOUS; SUBCUTANEOUS ONCE
Refills: 0 | Status: COMPLETED | OUTPATIENT
Start: 2022-04-25 | End: 2022-04-25

## 2022-04-25 RX ORDER — HEPARIN SODIUM 5000 [USP'U]/ML
5000 INJECTION INTRAVENOUS; SUBCUTANEOUS EVERY 8 HOURS
Refills: 0 | Status: DISCONTINUED | OUTPATIENT
Start: 2022-04-25 | End: 2022-05-03

## 2022-04-25 RX ORDER — SODIUM CHLORIDE 9 MG/ML
1000 INJECTION INTRAMUSCULAR; INTRAVENOUS; SUBCUTANEOUS ONCE
Refills: 0 | Status: DISCONTINUED | OUTPATIENT
Start: 2022-04-25 | End: 2022-04-25

## 2022-04-25 RX ORDER — PIPERACILLIN AND TAZOBACTAM 4; .5 G/20ML; G/20ML
3.38 INJECTION, POWDER, LYOPHILIZED, FOR SOLUTION INTRAVENOUS EVERY 8 HOURS
Refills: 0 | Status: DISCONTINUED | OUTPATIENT
Start: 2022-04-26 | End: 2022-04-26

## 2022-04-25 RX ORDER — LACTULOSE 10 G/15ML
20 SOLUTION ORAL
Refills: 0 | Status: DISCONTINUED | OUTPATIENT
Start: 2022-04-25 | End: 2022-04-28

## 2022-04-25 RX ORDER — OXYCODONE HYDROCHLORIDE 5 MG/1
15 TABLET ORAL ONCE
Refills: 0 | Status: DISCONTINUED | OUTPATIENT
Start: 2022-04-25 | End: 2022-04-25

## 2022-04-25 RX ORDER — METOPROLOL TARTRATE 50 MG
1 TABLET ORAL
Qty: 0 | Refills: 0 | DISCHARGE

## 2022-04-25 RX ORDER — METHYLPHENIDATE HCL 5 MG
1 TABLET ORAL
Qty: 0 | Refills: 0 | DISCHARGE

## 2022-04-25 RX ORDER — PIPERACILLIN AND TAZOBACTAM 4; .5 G/20ML; G/20ML
3.38 INJECTION, POWDER, LYOPHILIZED, FOR SOLUTION INTRAVENOUS ONCE
Refills: 0 | Status: COMPLETED | OUTPATIENT
Start: 2022-04-25 | End: 2022-04-25

## 2022-04-25 RX ORDER — TRAZODONE HCL 50 MG
50 TABLET ORAL AT BEDTIME
Refills: 0 | Status: DISCONTINUED | OUTPATIENT
Start: 2022-04-25 | End: 2022-05-03

## 2022-04-25 RX ORDER — QUETIAPINE FUMARATE 200 MG/1
50 TABLET, FILM COATED ORAL AT BEDTIME
Refills: 0 | Status: DISCONTINUED | OUTPATIENT
Start: 2022-04-25 | End: 2022-05-03

## 2022-04-25 RX ORDER — CLONAZEPAM 1 MG
0.5 TABLET ORAL DAILY
Refills: 0 | Status: DISCONTINUED | OUTPATIENT
Start: 2022-04-25 | End: 2022-04-25

## 2022-04-25 RX ORDER — OXYCODONE HYDROCHLORIDE 5 MG/1
15 TABLET ORAL EVERY 6 HOURS
Refills: 0 | Status: DISCONTINUED | OUTPATIENT
Start: 2022-04-25 | End: 2022-05-02

## 2022-04-25 RX ORDER — ESCITALOPRAM OXALATE 10 MG/1
20 TABLET, FILM COATED ORAL DAILY
Refills: 0 | Status: DISCONTINUED | OUTPATIENT
Start: 2022-04-25 | End: 2022-05-03

## 2022-04-25 RX ORDER — PANTOPRAZOLE SODIUM 20 MG/1
40 TABLET, DELAYED RELEASE ORAL DAILY
Refills: 0 | Status: DISCONTINUED | OUTPATIENT
Start: 2022-04-25 | End: 2022-04-26

## 2022-04-25 RX ORDER — CHLORHEXIDINE GLUCONATE 213 G/1000ML
1 SOLUTION TOPICAL
Refills: 0 | Status: DISCONTINUED | OUTPATIENT
Start: 2022-04-25 | End: 2022-04-26

## 2022-04-25 RX ORDER — ALBUMIN HUMAN 25 %
100 VIAL (ML) INTRAVENOUS ONCE
Refills: 0 | Status: DISCONTINUED | OUTPATIENT
Start: 2022-04-25 | End: 2022-04-26

## 2022-04-25 RX ORDER — FLUTICASONE PROPIONATE 50 MCG
1 SPRAY, SUSPENSION NASAL
Qty: 0 | Refills: 0 | DISCHARGE

## 2022-04-25 RX ORDER — SODIUM CHLORIDE 9 MG/ML
500 INJECTION, SOLUTION INTRAVENOUS ONCE
Refills: 0 | Status: DISCONTINUED | OUTPATIENT
Start: 2022-04-25 | End: 2022-04-26

## 2022-04-25 RX ORDER — NOREPINEPHRINE BITARTRATE/D5W 8 MG/250ML
0.05 PLASTIC BAG, INJECTION (ML) INTRAVENOUS
Qty: 8 | Refills: 0 | Status: DISCONTINUED | OUTPATIENT
Start: 2022-04-25 | End: 2022-04-26

## 2022-04-25 RX ADMIN — SODIUM CHLORIDE 500 MILLILITER(S): 9 INJECTION INTRAMUSCULAR; INTRAVENOUS; SUBCUTANEOUS at 20:04

## 2022-04-25 RX ADMIN — HEPARIN SODIUM 5000 UNIT(S): 5000 INJECTION INTRAVENOUS; SUBCUTANEOUS at 23:06

## 2022-04-25 RX ADMIN — Medication 14 MICROGRAM(S)/KG/MIN: at 19:20

## 2022-04-25 RX ADMIN — LACTULOSE 20 GRAM(S): 10 SOLUTION ORAL at 23:32

## 2022-04-25 RX ADMIN — HYDROMORPHONE HYDROCHLORIDE 0.5 MILLIGRAM(S): 2 INJECTION INTRAMUSCULAR; INTRAVENOUS; SUBCUTANEOUS at 23:20

## 2022-04-25 RX ADMIN — PIPERACILLIN AND TAZOBACTAM 200 GRAM(S): 4; .5 INJECTION, POWDER, LYOPHILIZED, FOR SOLUTION INTRAVENOUS at 19:54

## 2022-04-25 RX ADMIN — QUETIAPINE FUMARATE 50 MILLIGRAM(S): 200 TABLET, FILM COATED ORAL at 23:06

## 2022-04-25 RX ADMIN — OXYCODONE HYDROCHLORIDE 15 MILLIGRAM(S): 5 TABLET ORAL at 20:04

## 2022-04-25 RX ADMIN — OXYCODONE HYDROCHLORIDE 15 MILLIGRAM(S): 5 TABLET ORAL at 19:24

## 2022-04-25 RX ADMIN — HYDROMORPHONE HYDROCHLORIDE 0.5 MILLIGRAM(S): 2 INJECTION INTRAMUSCULAR; INTRAVENOUS; SUBCUTANEOUS at 23:05

## 2022-04-25 RX ADMIN — Medication 0.5 MILLIGRAM(S): at 23:05

## 2022-04-25 RX ADMIN — Medication 50 MILLIGRAM(S): at 23:05

## 2022-04-25 RX ADMIN — SODIUM CHLORIDE 500 MILLILITER(S): 9 INJECTION INTRAMUSCULAR; INTRAVENOUS; SUBCUTANEOUS at 17:10

## 2022-04-25 RX ADMIN — MIDODRINE HYDROCHLORIDE 10 MILLIGRAM(S): 2.5 TABLET ORAL at 23:06

## 2022-04-25 RX ADMIN — SODIUM CHLORIDE 500 MILLILITER(S): 9 INJECTION INTRAMUSCULAR; INTRAVENOUS; SUBCUTANEOUS at 18:50

## 2022-04-25 NOTE — H&P ADULT - ASSESSMENT
Assessment:  Mr. Saba is 52 YO M w/pmhx of Cirrhosis, CKD, GERD, RA, Lyme disease, HTN, obesity who presents to ED w/confusion and AMS. Patient recently admitted for liver biopsy, foreign body left in and patient was taken for exploration and successfully removed. Patient examined and is alert and orientated, not as confused as before. Patient's labs are significant for renal insuffiencey, baseline SCr 1.5-2, today 1.9. Patient underwent CTA to r/o PE as patient was hypoxic on arrival. CTA chest negative, received fluid for concern of LISSETH. Patient's BP labile and was started on Levophed. Zosyn was given in ED for SIRS criteria, no clear source of infection. Patient received 2L of fluid in ED, will need another 500cc to meet 30cc/kg bolus based on IBW. Lactate negative. Ammonia 79. Patient states he used to drink almost everyday but does not consider himself a heavy drinker     Problem List:  1. Distributive shock --> septic vs cirrhosis   2. Cirrhosis   3. Acute hypoxic respiratory failure   4. CKD  6. Atelectasis     Plan:  Neuro: Patient w/elevated ammonia, brought in for confusion. Not confused currently. Start lactulose and Rifaximin and trend ammonia   CV: Shock state, hypotension, could be septic or low BP can be 2/2 cirrhosis. Actively titrating Levophed to maintain MAP>65mmHg. Start Midodrine to wean vasopressors. 1 dose albumin now. 500cc bolus to complete sepsis fluid bolus   Pulm: Saturating okay on VM 50%. Likely atelectasis and abdominal pain contributing to hypoxia. Incentive spirometry chest PT, pulmonary toilet. CTA chest negative for PE   Renal: Patient w/hx of CKD, likely HRS type II. Currently SCr baseline, although received contrast, worry for LISSETH. Strict I/Os, goal UOP >0.5cc/kg/hr. Trend renal function and electrolytes, replete as needed. Avoid nephrotoxic agents  GI: Lactulose and Rifaximin for elevated ammonia. Liver bx shows fibrosis. PPI   ID: Patient started on Zosyn in ED for meeting SIRS criteria. Possible source can be surgical site although looks clean. Obtain UA. F/u blood and urine cultures. F/u urine legionella and strep. F/u MRSA. Trend WBC/fevers/procalcitonin   Heme:  Endo:    CRITICAL CARE TIME SPENT: 32 minutes assessing presenting problems of acute illness, which pose high probability of life threatening deterioration or end organ damage/dysfunction, as well as medical decision making including initiating plan of care, reviewing data, reviewing radiologic exams, discussing with multidisciplinary team,  discussing goals of care with patient/family, and writing this note.  Non-inclusive of procedures performed   Assessment:  Mr. Saba is 52 YO M w/pmhx of Cirrhosis, CKD, GERD, RA, Lyme disease, HTN, obesity who presents to ED w/confusion and AMS. Patient recently admitted for liver biopsy, foreign body left in and patient was taken for exploration and successfully removed. Patient examined and is alert and orientated, not as confused as before. Patient's labs are significant for renal insuffiencey, baseline SCr 1.5-2, today 1.9. Patient underwent CTA to r/o PE as patient was hypoxic on arrival. CTA chest negative, received fluid for concern of LISSETH. Patient's BP labile and was started on Levophed. Zosyn was given in ED for SIRS criteria, no clear source of infection. Patient received 2L of fluid in ED, will need another 500cc to meet 30cc/kg bolus based on IBW. Lactate negative. Ammonia 79. Patient states he used to drink almost everyday but does not consider himself a heavy drinker     Problem List:  1. Distributive shock --> septic vs cirrhosis   2. Cirrhosis   3. Acute hypoxic respiratory failure   4. CKD  6. Atelectasis     Plan:  Neuro: Patient w/elevated ammonia, brought in for confusion. Not confused currently. Start lactulose and Rifaximin and trend ammonia. C/w Escitalopram, trazodone, Seroquel. Hold methylphenidate and Concerta for now. Clonazepam prn anxiety   CV: Shock state, hypotension, could be septic or low BP can be 2/2 cirrhosis. Actively titrating Levophed to maintain MAP>65mmHg. Start Midodrine to wean vasopressors. 1 dose albumin now. 500cc bolus to complete sepsis fluid bolus. Holding Metoprolol, Losartan, and Furosemide in setting of shock   Pulm: Saturating okay on VM 50%. Likely atelectasis and abdominal pain contributing to hypoxia. Incentive spirometry chest PT, pulmonary toilet. CTA chest negative for PE   Renal: Patient w/hx of CKD, likely HRS type II. Currently SCr baseline, although received contrast, worry for LISSETH. Strict I/Os, goal UOP >0.5cc/kg/hr. Trend renal function and electrolytes, replete as needed. Avoid nephrotoxic agents  GI: Lactulose and Rifaximin for elevated ammonia. Liver bx shows fibrosis. PPI   ID: Patient started on Zosyn in ED for meeting SIRS criteria. Possible source can be surgical site although looks clean. Obtain UA. F/u blood and urine cultures. F/u urine legionella and strep. F/u MRSA. Trend WBC/fevers/procalcitonin   Heme: No signs of PE. Given decreased renal function, will place on HSQ for DVT prophylaxis   Endo: Goal blood glucose <180. F/u TSH, T4, lipids, and A1c     CRITICAL CARE TIME SPENT: 40 minutes assessing presenting problems of acute illness, which pose high probability of life threatening deterioration or end organ damage/dysfunction, as well as medical decision making including initiating plan of care, reviewing data, reviewing radiologic exams, discussing with multidisciplinary team,  discussing goals of care with patient/family, and writing this note.  Non-inclusive of procedures performed   Assessment:  Mr. Saba is 52 YO M w/pmhx of Cirrhosis, CKD, GERD, RA, Lyme disease, HTN, obesity who presents to ED w/confusion and AMS. Patient recently admitted for liver biopsy, foreign body left in and patient was taken for exploration and successfully removed. Patient examined and is alert and orientated, not as confused as before. Patient's labs are significant for renal insuffiencey, baseline SCr 1.5-2, today 1.9. Patient underwent CTA to r/o PE as patient was hypoxic on arrival. CTA chest negative, received fluid for concern of LISSETH. Patient's BP labile and was started on Levophed. Zosyn was given in ED for SIRS criteria, no clear source of infection. Patient received 2L of fluid in ED, will need another 500cc to meet 30cc/kg bolus based on IBW. Lactate negative. Ammonia 79. Patient states he used to drink almost everyday but does not consider himself a heavy drinker     Problem List:  1. Distributive shock --> septic vs cirrhosis   2. Cirrhosis   3. Acute hypoxic respiratory failure   4. CKD  5. Atelectasis     Plan:  Neuro: Patient w/elevated ammonia, brought in for confusion. Not confused currently. Start lactulose and Rifaximin and trend ammonia. C/w Escitalopram, trazodone, Seroquel. Hold methylphenidate and Concerta for now. Clonazepam prn anxiety   CV: Shock state, hypotension, could be septic or low BP can be 2/2 cirrhosis. Actively titrating Levophed to maintain MAP>65mmHg. Start Midodrine to wean vasopressors. 1 dose albumin now. 500cc bolus to complete sepsis fluid bolus. Holding Metoprolol, Losartan, and Furosemide in setting of shock   Pulm: Saturating okay on VM 50%. Likely atelectasis and abdominal pain contributing to hypoxia. Incentive spirometry chest PT, pulmonary toilet. CTA chest negative for PE   Renal: Patient w/hx of CKD, likely HRS type II. Currently SCr baseline, although received contrast, worry for LISSETH. Strict I/Os, goal UOP >0.5cc/kg/hr. Trend renal function and electrolytes, replete as needed. Avoid nephrotoxic agents  GI: Lactulose and Rifaximin for elevated ammonia. Liver bx shows fibrosis. PPI   ID: Patient started on Zosyn in ED for meeting SIRS criteria. Possible source can be surgical site although looks clean. Obtain UA. F/u blood and urine cultures. F/u urine legionella and strep. F/u MRSA. Trend WBC/fevers/procalcitonin   Heme: No signs of PE. Given decreased renal function, will place on HSQ for DVT prophylaxis   Endo: Goal blood glucose <180. F/u TSH, T4, lipids, and A1c     CRITICAL CARE TIME SPENT: 40 minutes assessing presenting problems of acute illness, which pose high probability of life threatening deterioration or end organ damage/dysfunction, as well as medical decision making including initiating plan of care, reviewing data, reviewing radiologic exams, discussing with multidisciplinary team,  discussing goals of care with patient/family, and writing this note.  Non-inclusive of procedures performed   Assessment:  Mr. Saba is 50 YO M w/pmhx of Cirrhosis, CKD, GERD, RA, Lyme disease, HTN, obesity who presents to ED w/confusion and AMS. Patient recently admitted for liver biopsy, foreign body left in and patient was taken for exploration and successfully removed. Patient examined and is alert and orientated, not as confused as before. Patient's labs are significant for renal insuffiencey, baseline SCr 1.5-2, today 1.9. Patient underwent CTA to r/o PE as patient was hypoxic on arrival. CTA chest negative, received fluid for concern of LISSETH. Patient's BP labile and was started on Levophed. Zosyn was given in ED for SIRS criteria, no clear source of infection. Patient received 2L of fluid in ED, will need another 500cc to meet 30cc/kg bolus based on IBW. Lactate negative. Ammonia 79. Patient states he used to drink almost everyday but does not consider himself a heavy drinker     Problem List:  1. Distributive shock   2. Cirrhosis   3. Acute hypoxic respiratory failure   4. CKD  5. Atelectasis     Plan:  Neuro: Patient w/elevated ammonia, brought in for confusion. Not confused currently. Start lactulose and Rifaximin and trend ammonia. C/w Escitalopram, trazodone, Seroquel. Hold methylphenidate and Concerta for now. Clonazepam prn anxiety   CV: Shock state, hypotension, likely distributive 2/2 cirrhosis. Actively titrating Levophed to maintain MAP>65mmHg. Start Midodrine to wean vasopressors. 1 dose albumin now. Holding Metoprolol, Losartan, and Furosemide in setting of shock   Pulm: Saturating okay on VM 50%. Likely atelectasis and abdominal pain contributing to hypoxia. Incentive spirometry chest PT, pulmonary toilet. CTA chest negative for PE   Renal: Patient w/hx of CKD, likely HRS type II. Currently SCr baseline, although received contrast, worry for LISSETH. Strict I/Os, goal UOP >0.5cc/kg/hr. Trend renal function and electrolytes, replete as needed. Avoid nephrotoxic agents  GI: Lactulose and Rifaximin for elevated ammonia. Liver bx shows fibrosis. PPI   ID: Patient started on Zosyn in ED for meeting SIRS criteria. Patient w/penicillin allergy, tolerated first dose. Does not seem to be infected. Hold abx at this time. Obtain UA. F/u blood and urine cultures. F/u urine legionella and strep. F/u MRSA. Trend WBC/fevers/procalcitonin   Heme: No signs of PE. Given decreased renal function, will place on HSQ for DVT prophylaxis   Endo: Goal blood glucose <180. F/u TSH, T4, lipids, and A1c     CRITICAL CARE TIME SPENT: 40 minutes assessing presenting problems of acute illness, which pose high probability of life threatening deterioration or end organ damage/dysfunction, as well as medical decision making including initiating plan of care, reviewing data, reviewing radiologic exams, discussing with multidisciplinary team,  discussing goals of care with patient/family, and writing this note.  Non-inclusive of procedures performed

## 2022-04-25 NOTE — H&P ADULT - NSHPPHYSICALEXAM_GEN_ALL_CORE
Physical Examination:  General: No acute distress.  Alert, oriented, interactive, nonfocal  HEENT: Pupils equal, reactive to light.  Symmetric  PULM: Clear to auscultation bilaterally, no significant sputum production  CVS: Regular rate and rhythm, no murmurs, rubs, or gallops  ABD: Soft, nondistended, normoactive bowel sounds, no masses. RUQ tenderness and ecchymosis   EXT: No edema, nontender  SKIN: Warm and well perfused, no rashes noted.  NEURO: A&Ox3, strength 5/5 all extremities, cranial nerves grossly intact, no focal deficits

## 2022-04-25 NOTE — ED ADULT NURSE NOTE - CHIEF COMPLAINT QUOTE
Pt presents to the ED s/p liver biopsy last week with Dr. Brizuela. Pt comes in today c/o R sided abdominal pain, confusion and "memory problems" x 2 days. As per sister pt had elevated ammonia levels last week and symptoms have been worsening again over the past two days. Denies cp, sob, dizziness, visual changes, weakness, nausea, vomiting.

## 2022-04-25 NOTE — ED ADULT TRIAGE NOTE - CHIEF COMPLAINT QUOTE
Pt presents to the ED s/p liver biopsy last week with Dr. Brizuela. Pt comes in today c/o R sided abdominal pain, confusion and "memory problems" x 2 days. As per sister pt had elevated ammonia levels last week and symptoms have been worsening again over the past two days. Denies cp, sob, dizziness, nausea, vomiting. Pt presents to the ED s/p liver biopsy last week with Dr. Brizuela. Pt comes in today c/o R sided abdominal pain, confusion and "memory problems" x 2 days. As per sister pt had elevated ammonia levels last week and symptoms have been worsening again over the past two days. Denies cp, sob, dizziness, visual changes, weakness, nausea, vomiting.

## 2022-04-25 NOTE — ED PROVIDER NOTE - CLINICAL SUMMARY MEDICAL DECISION MAKING FREE TEXT BOX
pt with hypoxia hx of LESLI CT of head noncon because of confusion. Plan: if kidney permits CT of chest and abd with contrast, if not, CT of chest and abd noncon, spoke with [facility] to see pt. pt likely an admission pt with hypoxia hx of LESLI CT of head noncon because of confusion. Plan: if kidney permits CT of chest and abd with contrast, if not, CT of chest and abd noncon, spoke with [facility] to see pt. pt likely an admission.

## 2022-04-25 NOTE — ED PROVIDER NOTE - CARE PLAN
Principal Discharge DX:	Cirrhosis  Secondary Diagnosis:	Hypotension   1 Principal Discharge DX:	Cirrhosis  Goal:	hypoxia alleviated with O2, some lung congestion, ascites, and hx of cirrhosis, elevated ammonia, lactate normal, VBG reviewed, noted hypotension, ICU saw patient, ICU to admit, consulted GI.  Secondary Diagnosis:	Hypotension

## 2022-04-25 NOTE — PROGRESS NOTE ADULT - SUBJECTIVE AND OBJECTIVE BOX
Critical care consult       Patient is a 51y old  Male who presents with a chief complaint of confusion, asked to see patient by dr. Walker    HPI:    This patient is 51 year old male recently diagnosed with cirrhosis  On 4/19 underwent bx. c/w fibroisis and steotohepatosis patient had retained foreign body  and had laparoscopic procedure to  remove above.. He was sent home  patient uses bipap at night  he presented today with inc confusion. he was on rifaxin and lactulose,  ammonia 70  in ed some hypoxia, borderline bp  creatinine 2. baseline ckd  had cta bibasilar ateletasis, no pe , no bleeding around liver    ros no chest pain, no hx. of heart disease, slightly abdominal pain, slight confusion, doesnt not feel sob, no headache, fever, chills cought loss of taste of smell    PAST MEDICAL & SURGICAL HISTORY:  Lyme disease  RA (rheumatoid arthritis)  Sleep apnea, unspecified type  Chronic back pain  GERD (gastroesophageal reflux disease)  Umbilical hernia  HTN (hypertension)  History of total right knee replacement  H/O spinal fusion  2014      FAMILY HISTORY:  Family history of hypertension  parent        MEDICATIONS  (STANDING):  oxyCODONE    IR 15 milliGRAM(s) Oral Once  sodium chloride 0.9% Bolus 500 milliLiter(s) IV Bolus once     Vital Signs Last 24 Hrs  T(C): 37.5 (25 Apr 2022 15:01), Max: 37.5 (25 Apr 2022 15:01)  T(F): 99.5 (25 Apr 2022 15:01), Max: 99.5 (25 Apr 2022 15:01)  HR: 91 (25 Apr 2022 17:30) (89 - 98)  BP: 96/57 (25 Apr 2022 17:30) (92/58 - 176/156)  BP(mean): 65 (25 Apr 2022 17:30) (65 - 161)  RR: 20 (25 Apr 2022 17:30) (17 - 20)  SpO2: 97% (25 Apr 2022 17:30) (92% - 100%)  Daily Height in cm: 193.04 (25 Apr 2022 14:52)    Daily     PHYSICAL EXAM:    General:  Alert, well-developed ,No acute distress. obese    Neuro:  Alert and oriented to person, place, and time. Able to communicate  well.  . 5/5 strength in all     HEENT:  No JVD, no masses, Eyes anicteric, No carotid bruits.No lymphadenopathy,  Cardiovascular:  Regular rate and rhythm, with normal S1 and S2. No murmurs, rubs,  or gallops. No JVD.    Lungs:  Lungs no wheezing dec bs at basesl.    Abdomen:  Normoactive  Soft, flat, slight ruq pain, ecchymosis around incisional site       Skin:  Warm, dry, well-perfused. No rashes or other lesions.     Extremities:  2+ pulses in upper and lower extremities. No lower extremity pain or  edema; legs are symmetric in appearance.    LABS:    04-25    136  |  102  |  33<H>  ----------------------------<  126<H>  4.6   |  29  |  1.98<H>    Ca    9.5      25 Apr 2022 16:28    TPro  7.8  /  Alb  3.6  /  TBili  1.7<H>  /  DBili  x   /  AST  35  /  ALT  35  /  AlkPhos  90  04-25          Lactate, Blood: 1.4 mmol/L (04-25 @ 16:28)

## 2022-04-25 NOTE — ED ADULT NURSE REASSESSMENT NOTE - NS ED NURSE REASSESS COMMENT FT1
Pt is A&O. Remains hypotensive. NS bolus in progress. ICU MD aware. Awaiting orders. Report given to Melissa BUCKLEY.
received report and transfer of care from Sofia BUCKLEY
Ed provider made aware

## 2022-04-25 NOTE — ED ADULT NURSE NOTE - NSICDXPASTMEDICALHX_GEN_ALL_CORE_FT
The patient will be discharged today to go home and to see me next week where we can organized her radiation therapy.  The appointment should be made for Monday of next week for her return visit and this should be in the morning hours. PAST MEDICAL HISTORY:  Chronic back pain     GERD (gastroesophageal reflux disease)     HTN (hypertension)     Lyme disease     RA (rheumatoid arthritis)     Sleep apnea, unspecified type     Umbilical hernia

## 2022-04-25 NOTE — ED PROVIDER NOTE - GASTROINTESTINAL, MLM
abd distended, ascites, ttp of abd diff worse in RUQ abd distended, ascites, ttp of abd diff worse in RUQ. BS+

## 2022-04-25 NOTE — PROGRESS NOTE ADULT - ASSESSMENT
Patient with newly diagnosed cirrhosis, hx. ckd  admitted with confusion, hepatic encephalopathy,  on rifaxin and lactulsoe which i would continue, liver c/w cirrhosis  CKD with baseline creatinine 1.5 to 2, got contrast, creainine 2. would hydrate, concern over possible LISSETH  slight hypoxia, no distress, chetan atelectasis from surgery uses bipap at home, would wear at night  does not need icu at this time

## 2022-04-25 NOTE — PROVIDER CONTACT NOTE (EICU) - ASSESSMENT
Tele-evaluation precludes physical exam.  Pertinent physical exam findings as per verbal communication by bedside provider are as following. Alert and oriented, reports was worse earlier, NAD; Chest S1S2 RRR; PULM: CTABL, ABD: Soft TTP in RUQ with ecchymosis; Ext No edema  CT Chest Abd/Pelvis: *  No acute chest pathology. Bibasilar dependent atelectasis.   Interval removal of catheter fragment from the right lateral abdominal wall.   Stable mild perihepatic stranding without hematoma or hemoperitoneum.   Cirrhosis and portal hypertension with varices.  CT HEAD: No acute findings  UA neg  Cr 1.98 from 2.11  Hgb 11.8 from 13.3

## 2022-04-25 NOTE — ED ADULT NURSE NOTE - OBJECTIVE STATEMENT
Pt presents with increasing confusion. s/p liver bx 4/19. Pain at bx site. Difficulty completing thoughts/sentences for past day. O2 sat 92 on 4LNC. No resp hx. Denies CP, Recent travel, no sick contacts.

## 2022-04-25 NOTE — ED PROVIDER NOTE - NS_ ATTENDINGSCRIBEDETAILS _ED_A_ED_FT
I Norberto Gore MD saw and examined the patient. Scribe documented for me and under my supervision. I have modified the scribe's documentation where necessary to reflect my history, physical exam and other relevant documentations pertinent to the care of the patient.

## 2022-04-25 NOTE — ED ADULT NURSE REASSESSMENT NOTE - BP NONINVASIVE DIASTOLIC (MM HG)
61 Health Maintenance Due   Topic Date Due   • Shingles Vaccine (1 of 2) 10/13/1986   • Medicare Wellness 65+  11/29/2014   • DTaP/Tdap/Td Vaccine (2 - Td) 05/04/2019   • Influenza Vaccine (1) 09/01/2019       Patient is due for topics as listed above but is not proceeding with Immunization(s) Dtap/Tdap/Td, Influenza and Shingles and MWV (Medicare Wellness Visit) at this time. Patient declines the influenza vaccination for the season.

## 2022-04-25 NOTE — PROVIDER CONTACT NOTE (EICU) - RECOMMENDATIONS
Admit to ICU for SIRS with shock in the setting of cirrhosis; CKD,   - C/w lactulose, rifaximin for chronic cirrhosis  - Levophed to maintain MAP>65; start albumin consider midodrine; wean levophed as tolerated  - On ventimask earlier, on eval on nasal cannula; continue with incentive spirometry  - CKD - currently stable, avoid nephrotoxic agents;   - S/p zosyn in ED; continue empiric therapy (clarify allergy to Pencillins, tolerated first dose in ED and cefazolin on previous admission), follow up cultures, fevers, and trend procal.    Admit to ICU.

## 2022-04-25 NOTE — PHARMACOTHERAPY INTERVENTION NOTE - COMMENTS
Medication reconciliation completed.  Reviewed Medication list and confirmed med allergies with patient; confirmed with Dr. Villarreal MedHx.  Pt confirms that he did not take any medication at home today.

## 2022-04-25 NOTE — ED PROVIDER NOTE - EYES, MLM
Clear bilaterally, pupils equal, round and reactive to light. Clear bilaterally, pupils equal, round and reactive to light. EOMI. Visual fields intact x 4 quadrnats.

## 2022-04-25 NOTE — ED PROVIDER NOTE - NEUROLOGICAL, MLM
Alert and oriented, no focal deficits, no motor or sensory deficits. Alert and oriented, no focal deficits, no motor or sensory deficits. CNs 2-12 intact. GCS=15

## 2022-04-25 NOTE — PROVIDER CONTACT NOTE (EICU) - BACKGROUND
50 yo M with cirrhosis, ckd,  GERD, RA, Lyme disease, HTN, obesity a/w confusion and AMS s/p recent admission for liver biopsy with residual foreign body s/p exploration with removal.  Pt upon eval by ICU team noted to be alert and oriented, found to have labile BP, required 30cc/kg IVF bolus.  Found to have no lactate with ammonia 79 and met SIRS criteria in the ED s/p zosyn.  Requiring levophed to maintain MAP>65.

## 2022-04-25 NOTE — ED PROVIDER NOTE - ATTENDING CONTRIBUTION TO CARE
I Norberto Gore MD saw and examined the patient. Resident physician saw and examined the patient under my supervision and I was involved in key steps in care of this patient. I discussed the care of the patient with resident and agree with resident's plan, assessment and care of the patient while in the ED.

## 2022-04-25 NOTE — PATIENT PROFILE ADULT - FALL HARM RISK - UNIVERSAL INTERVENTIONS
Bed in lowest position, wheels locked, appropriate side rails in place/Call bell, personal items and telephone in reach/Instruct patient to call for assistance before getting out of bed or chair/Non-slip footwear when patient is out of bed/[place] to call system/Physically safe environment - no spills, clutter or unnecessary equipment/Purposeful Proactive Rounding/Room/bathroom lighting operational, light cord in reach

## 2022-04-25 NOTE — H&P ADULT - HISTORY OF PRESENT ILLNESS
Quality 226: Preventive Care And Screening: Tobacco Use: Screening And Cessation Intervention: Patient screened for tobacco use and is an ex/non-smoker Detail Level: Generalized Quality 431: Preventive Care And Screening: Unhealthy Alcohol Use - Screening: Patient screened for unhealthy alcohol use using a single question and scores less than 2 times per year Mr. Saba is 52 YO M w/pmhx of Cirrhosis, CKD, GERD, RA, Lyme disease, HTN, obesity who presents to ED w/confusion and AMS. Patient recently admitted for liver biopsy, foreign body left in and patient was taken for exploration and successfully removed. Patient examined and is alert and orientated, not as confused as before. Patient's labs are significant for renal insuffiencey, baseline SCr 1.5-2, today 1.9. Patient underwent CTA to r/o PE as patient was hypoxic on arrival. CTA chest negative, received fluid for concern of LISSETH. Patient's BP labile and was started on Levophed. Zosyn was given in ED for SIRS criteria, no clear source of infection. Patient received 2L of fluid in ED, will need another 500cc to meet 30cc/kg bolus based on IBW. Lactate negative. Ammonia 79. Patient states he used to drink almost everyday but does not consider himself a heavy drinker

## 2022-04-25 NOTE — ED PROVIDER NOTE - CARDIAC, MLM
No Normal rate, regular rhythm.  Heart sounds S1, S2.  No murmurs, rubs or gallops. Normal rate, regular rhythm.  Heart sounds S1, S2.  No rubs or gallops. 2+ pulses in bilateral dp and radial arteries. Cap refill less than 2 seconds.

## 2022-04-25 NOTE — ED PROVIDER NOTE - OBJECTIVE STATEMENT
50 y/o male with a PMHx of liver failure, cirrhosis, acute kidney insufficiency, GERD, rheumatoid arthritis, lyme disease, HTN, obesity, abd hernia, and liver biopsy presents to the ED with cc of AMS, confusion and SOB. Pulse ox at 88% measured in ED. Denies fever, trauma, and neck stiffness. Hx also provide by pt spouse and pt sister physically present.

## 2022-04-25 NOTE — ED PROVIDER NOTE - MUSCULOSKELETAL, MLM
Spine appears normal, range of motion is not limited, no muscle or joint tenderness. 5/5 motor strength in UE Spine appears normal, range of motion is not limited, no muscle or joint tenderness. 5/5 motor strength in UE and LE on flexion and extension of all limbs.

## 2022-04-25 NOTE — ED PROVIDER NOTE - PROGRESS NOTE DETAILS
Sofía LUJAN: Patient with liver dx, elevated ammonia, concern for AMS, not safe for dc. Noted hypotension, contacted ICU. ICU PA assessment of patient. Patient with persistent hypotension, not safe for dc, will admit to ICU. Sofía LUJAN: Updated patient and patient's spouse with the results of the labs and including CT, and lab findings. Patient and spouse wished to know the result of an outpatient biopsy. Asked me to contact Dr. De La Vega who had informed them that results might be back. Contacted Dr. De La Vega, Dr. Garcia on call , called back will follow up with pt/family via Dr. De La Vega. Updated patient and wife. Family understanding, happy with care in ED, will await GI eval. GI call back appreciated. ICU consult and care of the patient is appreciated.

## 2022-04-26 LAB
A1C WITH ESTIMATED AVERAGE GLUCOSE RESULT: 7.2 % — HIGH (ref 4–5.6)
ALBUMIN SERPL ELPH-MCNC: 3.1 G/DL — LOW (ref 3.3–5)
ALBUMIN SERPL ELPH-MCNC: 3.2 G/DL — LOW (ref 3.3–5)
ALP SERPL-CCNC: 88 U/L — SIGNIFICANT CHANGE UP (ref 40–120)
ALP SERPL-CCNC: 91 U/L — SIGNIFICANT CHANGE UP (ref 40–120)
ALT FLD-CCNC: 28 U/L — SIGNIFICANT CHANGE UP (ref 12–78)
ALT FLD-CCNC: 30 U/L — SIGNIFICANT CHANGE UP (ref 12–78)
AMMONIA BLD-MCNC: 92 UMOL/L — HIGH (ref 11–32)
AMMONIA BLD-MCNC: 96 UMOL/L — HIGH (ref 11–32)
ANION GAP SERPL CALC-SCNC: 3 MMOL/L — LOW (ref 5–17)
ANION GAP SERPL CALC-SCNC: 4 MMOL/L — LOW (ref 5–17)
APTT BLD: 36.6 SEC — HIGH (ref 27.5–35.5)
AST SERPL-CCNC: 31 U/L — SIGNIFICANT CHANGE UP (ref 15–37)
AST SERPL-CCNC: 34 U/L — SIGNIFICANT CHANGE UP (ref 15–37)
BASOPHILS # BLD AUTO: 0.06 K/UL — SIGNIFICANT CHANGE UP (ref 0–0.2)
BASOPHILS NFR BLD AUTO: 0.9 % — SIGNIFICANT CHANGE UP (ref 0–2)
BILIRUB DIRECT SERPL-MCNC: 0.5 MG/DL — HIGH (ref 0–0.3)
BILIRUB INDIRECT FLD-MCNC: 1.1 MG/DL — HIGH (ref 0.2–1)
BILIRUB SERPL-MCNC: 1.6 MG/DL — HIGH (ref 0.2–1.2)
BILIRUB SERPL-MCNC: 2 MG/DL — HIGH (ref 0.2–1.2)
BUN SERPL-MCNC: 27 MG/DL — HIGH (ref 7–23)
BUN SERPL-MCNC: 29 MG/DL — HIGH (ref 7–23)
CALCIUM SERPL-MCNC: 9.2 MG/DL — SIGNIFICANT CHANGE UP (ref 8.5–10.1)
CALCIUM SERPL-MCNC: 9.3 MG/DL — SIGNIFICANT CHANGE UP (ref 8.5–10.1)
CHLORIDE SERPL-SCNC: 106 MMOL/L — SIGNIFICANT CHANGE UP (ref 96–108)
CHLORIDE SERPL-SCNC: 106 MMOL/L — SIGNIFICANT CHANGE UP (ref 96–108)
CHOLEST SERPL-MCNC: 158 MG/DL — SIGNIFICANT CHANGE UP
CO2 SERPL-SCNC: 26 MMOL/L — SIGNIFICANT CHANGE UP (ref 22–31)
CO2 SERPL-SCNC: 28 MMOL/L — SIGNIFICANT CHANGE UP (ref 22–31)
CREAT SERPL-MCNC: 1.54 MG/DL — HIGH (ref 0.5–1.3)
CREAT SERPL-MCNC: 1.71 MG/DL — HIGH (ref 0.5–1.3)
EGFR: 47 ML/MIN/1.73M2 — LOW
EGFR: 54 ML/MIN/1.73M2 — LOW
EOSINOPHIL # BLD AUTO: 0.18 K/UL — SIGNIFICANT CHANGE UP (ref 0–0.5)
EOSINOPHIL NFR BLD AUTO: 2.6 % — SIGNIFICANT CHANGE UP (ref 0–6)
ESTIMATED AVERAGE GLUCOSE: 160 MG/DL — HIGH (ref 68–114)
GLUCOSE SERPL-MCNC: 129 MG/DL — HIGH (ref 70–99)
GLUCOSE SERPL-MCNC: 164 MG/DL — HIGH (ref 70–99)
HCT VFR BLD CALC: 33.9 % — LOW (ref 39–50)
HCT VFR BLD CALC: 35.7 % — LOW (ref 39–50)
HDLC SERPL-MCNC: 27 MG/DL — LOW
HGB BLD-MCNC: 11.5 G/DL — LOW (ref 13–17)
HGB BLD-MCNC: 12.1 G/DL — LOW (ref 13–17)
IMM GRANULOCYTES NFR BLD AUTO: 0.3 % — SIGNIFICANT CHANGE UP (ref 0–1.5)
INR BLD: 1.43 RATIO — HIGH (ref 0.88–1.16)
LACTATE SERPL-SCNC: 1 MMOL/L — SIGNIFICANT CHANGE UP (ref 0.7–2)
LACTATE SERPL-SCNC: 1.4 MMOL/L — SIGNIFICANT CHANGE UP (ref 0.7–2)
LEGIONELLA AG UR QL: NEGATIVE — SIGNIFICANT CHANGE UP
LIPID PNL WITH DIRECT LDL SERPL: 107 MG/DL — HIGH
LYMPHOCYTES # BLD AUTO: 1.7 K/UL — SIGNIFICANT CHANGE UP (ref 1–3.3)
LYMPHOCYTES # BLD AUTO: 24.3 % — SIGNIFICANT CHANGE UP (ref 13–44)
MAGNESIUM SERPL-MCNC: 2.1 MG/DL — SIGNIFICANT CHANGE UP (ref 1.6–2.6)
MAGNESIUM SERPL-MCNC: 2.2 MG/DL — SIGNIFICANT CHANGE UP (ref 1.6–2.6)
MCHC RBC-ENTMCNC: 33 PG — SIGNIFICANT CHANGE UP (ref 27–34)
MCHC RBC-ENTMCNC: 33.3 PG — SIGNIFICANT CHANGE UP (ref 27–34)
MCHC RBC-ENTMCNC: 33.9 GM/DL — SIGNIFICANT CHANGE UP (ref 32–36)
MCHC RBC-ENTMCNC: 33.9 GM/DL — SIGNIFICANT CHANGE UP (ref 32–36)
MCV RBC AUTO: 97.3 FL — SIGNIFICANT CHANGE UP (ref 80–100)
MCV RBC AUTO: 98.3 FL — SIGNIFICANT CHANGE UP (ref 80–100)
MONOCYTES # BLD AUTO: 0.95 K/UL — HIGH (ref 0–0.9)
MONOCYTES NFR BLD AUTO: 13.6 % — SIGNIFICANT CHANGE UP (ref 2–14)
NEUTROPHILS # BLD AUTO: 4.09 K/UL — SIGNIFICANT CHANGE UP (ref 1.8–7.4)
NEUTROPHILS NFR BLD AUTO: 58.3 % — SIGNIFICANT CHANGE UP (ref 43–77)
NON HDL CHOLESTEROL: 131 MG/DL — HIGH
PHOSPHATE SERPL-MCNC: 2.3 MG/DL — LOW (ref 2.5–4.5)
PHOSPHATE SERPL-MCNC: 2.3 MG/DL — LOW (ref 2.5–4.5)
PLATELET # BLD AUTO: 79 K/UL — LOW (ref 150–400)
PLATELET # BLD AUTO: 85 K/UL — LOW (ref 150–400)
POTASSIUM SERPL-MCNC: 4.6 MMOL/L — SIGNIFICANT CHANGE UP (ref 3.5–5.3)
POTASSIUM SERPL-MCNC: 4.6 MMOL/L — SIGNIFICANT CHANGE UP (ref 3.5–5.3)
POTASSIUM SERPL-SCNC: 4.6 MMOL/L — SIGNIFICANT CHANGE UP (ref 3.5–5.3)
POTASSIUM SERPL-SCNC: 4.6 MMOL/L — SIGNIFICANT CHANGE UP (ref 3.5–5.3)
PROCALCITONIN SERPL-MCNC: 0.18 NG/ML — HIGH (ref 0.02–0.1)
PROT SERPL-MCNC: 7.3 GM/DL — SIGNIFICANT CHANGE UP (ref 6–8.3)
PROT SERPL-MCNC: 7.4 GM/DL — SIGNIFICANT CHANGE UP (ref 6–8.3)
PROTHROM AB SERPL-ACNC: 16.6 SEC — HIGH (ref 10.5–13.4)
RBC # BLD: 3.45 M/UL — LOW (ref 4.2–5.8)
RBC # BLD: 3.67 M/UL — LOW (ref 4.2–5.8)
RBC # FLD: 12.1 % — SIGNIFICANT CHANGE UP (ref 10.3–14.5)
RBC # FLD: 12.2 % — SIGNIFICANT CHANGE UP (ref 10.3–14.5)
SODIUM SERPL-SCNC: 135 MMOL/L — SIGNIFICANT CHANGE UP (ref 135–145)
SODIUM SERPL-SCNC: 138 MMOL/L — SIGNIFICANT CHANGE UP (ref 135–145)
T4 AB SER-ACNC: 8.2 UG/DL — SIGNIFICANT CHANGE UP (ref 4.6–12)
TRIGL SERPL-MCNC: 122 MG/DL — SIGNIFICANT CHANGE UP
TSH SERPL-MCNC: 2.49 UU/ML — SIGNIFICANT CHANGE UP (ref 0.34–4.82)
WBC # BLD: 6.6 K/UL — SIGNIFICANT CHANGE UP (ref 3.8–10.5)
WBC # BLD: 7 K/UL — SIGNIFICANT CHANGE UP (ref 3.8–10.5)
WBC # FLD AUTO: 6.6 K/UL — SIGNIFICANT CHANGE UP (ref 3.8–10.5)
WBC # FLD AUTO: 7 K/UL — SIGNIFICANT CHANGE UP (ref 3.8–10.5)

## 2022-04-26 PROCEDURE — 99233 SBSQ HOSP IP/OBS HIGH 50: CPT

## 2022-04-26 RX ADMIN — LACTULOSE 20 GRAM(S): 10 SOLUTION ORAL at 06:36

## 2022-04-26 RX ADMIN — QUETIAPINE FUMARATE 50 MILLIGRAM(S): 200 TABLET, FILM COATED ORAL at 21:22

## 2022-04-26 RX ADMIN — LACTULOSE 20 GRAM(S): 10 SOLUTION ORAL at 23:05

## 2022-04-26 RX ADMIN — LACTULOSE 20 GRAM(S): 10 SOLUTION ORAL at 18:26

## 2022-04-26 RX ADMIN — OXYCODONE HYDROCHLORIDE 15 MILLIGRAM(S): 5 TABLET ORAL at 10:30

## 2022-04-26 RX ADMIN — OXYCODONE HYDROCHLORIDE 15 MILLIGRAM(S): 5 TABLET ORAL at 17:15

## 2022-04-26 RX ADMIN — OXYCODONE HYDROCHLORIDE 15 MILLIGRAM(S): 5 TABLET ORAL at 23:02

## 2022-04-26 RX ADMIN — ESCITALOPRAM OXALATE 20 MILLIGRAM(S): 10 TABLET, FILM COATED ORAL at 09:01

## 2022-04-26 RX ADMIN — Medication 50 MILLIGRAM(S): at 21:22

## 2022-04-26 RX ADMIN — HEPARIN SODIUM 5000 UNIT(S): 5000 INJECTION INTRAVENOUS; SUBCUTANEOUS at 06:36

## 2022-04-26 RX ADMIN — HEPARIN SODIUM 5000 UNIT(S): 5000 INJECTION INTRAVENOUS; SUBCUTANEOUS at 14:44

## 2022-04-26 RX ADMIN — LACTULOSE 20 GRAM(S): 10 SOLUTION ORAL at 12:02

## 2022-04-26 RX ADMIN — HEPARIN SODIUM 5000 UNIT(S): 5000 INJECTION INTRAVENOUS; SUBCUTANEOUS at 21:22

## 2022-04-26 RX ADMIN — OXYCODONE HYDROCHLORIDE 15 MILLIGRAM(S): 5 TABLET ORAL at 11:01

## 2022-04-26 RX ADMIN — OXYCODONE HYDROCHLORIDE 15 MILLIGRAM(S): 5 TABLET ORAL at 03:51

## 2022-04-26 RX ADMIN — MIDODRINE HYDROCHLORIDE 10 MILLIGRAM(S): 2.5 TABLET ORAL at 06:36

## 2022-04-26 NOTE — CONSULT NOTE ADULT - SUBJECTIVE AND OBJECTIVE BOX
GI consult    HPI:  Mr. Sims is 50 YO M w/pmhx of Cirrhosis, CKD, GERD, RA, Lyme disease, HTN, obesity who presents to ED w/confusion and AMS. Patient recently admitted for liver biopsy, foreign body left in and patient was taken for exploration and successfully removed. Patient examined and is alert and orientated, not as confused as before. Patient's labs are significant for renal insuffiencey, baseline SCr 1.5-2, today 1.9. Patient underwent CTA to r/o PE as patient was hypoxic on arrival. CTA chest negative, received fluid for concern of LISSETH. Patient's BP labile and was started on Levophed. Zosyn was given in ED for SIRS criteria, no clear source of infection. Patient received 2L of fluid in ED, will need another 500cc to meet 30cc/kg bolus based on IBW. Lactate negative. Ammonia 79. Patient states he used to drink almost everyday but does not consider himself a heavy drinker  (25 Apr 2022 20:34)    Reviewed liver bx with path this AM: consistent with alcoholic liver disease and cirrhosis. Pt with ongoing encephalopathy, per wife was not taking lactulose but per patient was taking. Was given significant amount of fluid in ER. Currently with R flank pain at site of liver bx and remains lethargic with asterixis.       PAST MEDICAL & SURGICAL HISTORY:  Lyme disease    RA (rheumatoid arthritis)    Sleep apnea, unspecified type    Chronic back pain    GERD (gastroesophageal reflux disease)    Umbilical hernia    HTN (hypertension)    History of total right knee replacement    H/O spinal fusion  2014        Home Medications:  cefpodoxime 200 mg oral tablet: 1 tab(s) orally every 12 hours for 14 days  ***course not complete, pt has a few days left*** (25 Apr 2022 20:31)  clonazePAM 0.5 mg oral tablet: 1 tab(s) orally once a day (in the afternoon), As Needed (25 Apr 2022 20:31)  Concerta 18 mg/24 hr oral tablet, extended release: 1 tab(s) orally once a day (in the afternoon) (25 Apr 2022 20:31)  escitalopram 20 mg oral tablet: 1 tab(s) orally once a day (25 Apr 2022 19:32)  Flonase 50 mcg/inh nasal spray: 1 spray(s) nasal once a day (25 Apr 2022 19:32)  furosemide 40 mg oral tablet: 1 tab(s) orally once a day (at bedtime) (25 Apr 2022 19:32)  losartan 100 mg oral tablet: 1 tab(s) orally once a day (at bedtime) (25 Apr 2022 20:31)  methylphenidate 27 mg/24 hr oral tablet, extended release: 1 tab(s) orally once a day (in the morning) (25 Apr 2022 20:31)  metoprolol succinate 50 mg oral tablet, extended release: 1 tab(s) orally once a day (at bedtime) (25 Apr 2022 20:31)  oxyCODONE 15 mg oral tablet: 1 tab(s) orally every 6 hours (25 Apr 2022 20:31)  QUEtiapine 50 mg oral tablet: 1 tab(s) orally once a day (at bedtime) (25 Apr 2022 20:31)      MEDICATIONS  (STANDING):  escitalopram 20 milliGRAM(s) Oral daily  heparin   Injectable 5000 Unit(s) SubCutaneous every 8 hours  lactulose Syrup 20 Gram(s) Oral four times a day  QUEtiapine 50 milliGRAM(s) Oral at bedtime  rifAXIMin 550 milliGRAM(s) Oral two times a day  traZODone 50 milliGRAM(s) Oral at bedtime    MEDICATIONS  (PRN):  clonazePAM  Tablet 0.5 milliGRAM(s) Oral daily PRN anxiety  oxyCODONE    IR 15 milliGRAM(s) Oral every 6 hours PRN Moderate Pain (4 - 6)      Allergies    penicillins (Unknown)    Intolerances        SOCIAL HISTORY: ETOH     FAMILY HISTORY:  Family history of hypertension  parent        ROS  As above  Otherwise unremarkable, all systems reviewed    PE:  Vital Signs Last 24 Hrs  T(C): 36.9 (26 Apr 2022 10:00), Max: 37.5 (25 Apr 2022 15:01)  T(F): 98.5 (26 Apr 2022 10:00), Max: 99.5 (25 Apr 2022 15:01)  HR: 74 (26 Apr 2022 13:00) (70 - 102)  BP: 112/63 (26 Apr 2022 13:00) (74/38 - 176/156)  BP(mean): 75 (26 Apr 2022 13:00) (45 - 161)  RR: 12 (26 Apr 2022 13:00) (11 - 22)  SpO2: 92% (26 Apr 2022 13:00) (88% - 100%)    Constitutional: obese, NAD, in chair, +asterixis  Anicteric   Respiratory: CTABL, breathing comfortably  Cardiovascular: S1 and S2, RRR  Gastrointestinal: +BS, soft, non tender, non distended, no mass  Extremities: warm, well perfused, trace edema  Psychiatric: Normal mood, normal affect  Neuro: moves all extremities, grossly intact  Skin: No rashes or lesions    LABS:                        11.5   7.00  )-----------( 85       ( 26 Apr 2022 05:25 )             33.9     04-26    138  |  106  |  27<H>  ----------------------------<  129<H>  4.6   |  28  |  1.54<H>    Ca    9.2      26 Apr 2022 05:25  Phos  2.3     04-26  Mg     2.2     04-26    TPro  7.3  /  Alb  3.1<L>  /  TBili  1.6<H>  /  DBili  0.5<H>  /  AST  31  /  ALT  28  /  AlkPhos  88  04-26    PT/INR - ( 26 Apr 2022 05:25 )   PT: 16.6 sec;   INR: 1.43 ratio         PTT - ( 26 Apr 2022 05:25 )  PTT:36.6 sec  LIVER FUNCTIONS - ( 26 Apr 2022 05:25 )  Alb: 3.1 g/dL / Pro: 7.3 gm/dL / ALK PHOS: 88 U/L / ALT: 28 U/L / AST: 31 U/L / GGT: x           ammonia 96      ACCESSION No:  60 [id number]  Patient:   ARTEM SIMS      Accession:                             [id number]    Collected Date/Time:                   4/22/2022 17:01 EDT  Received Date/Time:                    4/25/2022 07:33 EDT    Surgical Pathology Report - Auth (Verified)    Specimen(s) Submitted  1  Right flank foreign body    Final Diagnosis  PLEASE SEE GROSS DESCRIPTION.    Verified by: MARCO GRANT M.D.  (Electronic Signature)  Reported on: 04/25/22 14:12 EDT, [address] [place] [address]  Phone: [phone]   Fax: [phone]  _________________________________________________________________    Clinical Information  Foreign body right flank.      Gross Description  Received:  Without fixative labeled  "right flank foreign body"  Description:  A 4.5 cm in length by 0.1 cm in diameter gray metal object  Submitted:  Gross examination only. No sections submitted.    Fouzia Gilliam 04/25/2022 07:50 AM    Disclaimer  In addition to other data that may appear on the specimen containers, all  labels have been inspected to confirm the presence of the patient's name  and date of birth.      ACC: [id number] EXAM:  CT ABDOMEN AND PELVIS                        ACC: [id number] EXAM:  CT CHEST                          PROCEDURE DATE:  04/25/2022          INTERPRETATION:  CLINICAL INFORMATION: Hypoxia, shortness of breath,   liver disease status post liver biopsy    COMPARISON: CT chest/abdomen/pelvis 4/22/2022    CONTRAST/COMPLICATIONS:  IV Contrast: NONE  Oral Contrast: NONE  Complications: None reported at time of study completion    PROCEDURE:  CT of the Chest, Abdomen and Pelvis was performed.  Sagittal and coronal reformats were performed.    FINDINGS:  CHEST:  LUNGS AND LARGE AIRWAYS: The central airways are patent. Bibasilar   atelectasis.  PLEURA: No pleural abnormality.  VESSELS: Normal caliber aorta.  HEART: No cardiomegaly. No pericardial effusion. Coronary artery   calcifications are present.  MEDIASTINUM AND KERRI: No adenopathy.  CHEST WALL AND LOWER NECK: Bilateral gynecomastia.    ABDOMEN AND PELVIS:  LIVER: Morphologic changes of cirrhosis. Steatosis.  BILE DUCTS: Nondilated.  GALLBLADDER: Gallstones.  SPLEEN: Splenomegaly measuring 16 cm  PANCREAS: Diffuse atrophy.  ADRENALS: Normal.  KIDNEYS/URETERS: No hydronephrosis or urinary tract calculi.    BLADDER: Underdistended limiting evaluation.  REPRODUCTIVE ORGANS: Nonenlarged.    BOWEL: No bowel-related abnormality. Specifically, no evidence of acute   diverticulitis. Normal appendix and ileocecal region. No bowel   obstruction or bowel inflammation.  PERITONEUM: No free air, ascites, or hemoperitoneum. Stable mild   stranding around the liver without hematoma.  VESSELS: Normal caliber aorta. Upper abdominal varices are present.  RETROPERITONEUM/LYMPH NODES: No adenopathy.  ABDOMINAL WALL: Normal. Interval removal of catheter fragment from the   right lateral abdominal wall.  BONES: No acute bony abnormality. Lumbar fusion hardware.    IMPRESSION:  *  No acute chest pathology. Bibasilar dependent atelectasis.  *  Interval removal of catheter fragment from the right lateral abdominal   wall.  *  Stable mild perihepatic stranding without hematoma or hemoperitoneum.  *  Cirrhosis and portal hypertension with varices.      --- End of Report ---            RAMANABEVERLY ARAGON MD; Attending Radiologist  This document has been electronically signed. Apr 25 2022  6:14PM    (CT scan images reviewed personally in PACS)

## 2022-04-26 NOTE — PROGRESS NOTE ADULT - SUBJECTIVE AND OBJECTIVE BOX
Events Overnight: was admtted to ICU for episode hypotension, was never on pressors bp better, ammonia level still elevated      still on nasal cannula    HPI:      his patient is 51 year old male recently diagnosed with cirrhosis  On  underwent bx. c/w fibroisis and steatohepattitis  patient had retained foreign body  and had laparoscopic procedure to  remove above.. He was sent home  patient uses bipap at night  he presented today with inc confusion. he was on rifaxin and lactulose,  ammonia 70  in ed some hypoxia, borderline bp  creatinine 2. baseline ckd  had cta bibasilar atelectasis  no pe , no bleeding around liver    THis   ros no chest pain, no hx. of heart disease still some abdominal  pain, more appropriate , doesnt not feel sob, no headache, fever, chills, cough  loss of taste of smell  on oxycodone for chronic pain at home    PAST MEDICAL & SURGICAL HISTORY:  Lyme disease  RA (rheumatoid arthritis)  Sleep apnea, unspecified type  Chronic back pain  GERD (gastroesophageal reflux disease)  Umbilical hernia  HTN (hypertension)  History of total right knee replacement  H/O spinal fusion        MEDICATIONS  (STANDING):  escitalopram 20 milliGRAM(s) Oral daily  heparin   Injectable 5000 Unit(s) SubCutaneous every 8 hours  lactulose Syrup 20 Gram(s) Oral four times a day  QUEtiapine 50 milliGRAM(s) Oral at bedtime  rifAXIMin 550 milliGRAM(s) Oral two times a day  traZODone 50 milliGRAM(s) Oral at bedtime    MEDICATIONS  (PRN):  clonazePAM  Tablet 0.5 milliGRAM(s) Oral daily PRN anxiety  oxyCODONE    IR 15 milliGRAM(s) Oral every 6 hours PRN Moderate Pain (4 - 6)                Height (cm): 193 ( @ 14:52)  Weight (kg): 149.7 ( @ 14:52)  BMI (kg/m2): 40.2 ( @ 14:52)    ICU Vital Signs Last 24 Hrs  T(C): 37.1 (2022 06:00), Max: 37.5 (2022 15:01)  T(F): 98.7 (2022 06:00), Max: 99.5 (2022 15:01)  HR: 86 (2022 07:00) (70 - 102)  BP: 133/46 (2022 07:00) (74/38 - 176/156)  BP(mean): 67 (2022 07:00) (45 - 161)  ABP: --  ABP(mean): --  RR: 12 (2022 07:00) (11 - 22)  SpO2: 95% (2022 07:00) (88% - 100%)    I&O's Summary    2022 07:01  -  2022 07:00  --------------------------------------------------------  IN: 97 mL / OUT: 1530 mL / NET: -1433 mL    Physical Exam    General: Alert, well-developed ,No acute distress. obese  Neuro: Alert and oriented to person, place, and time. Able to communicate  well.  . 5/5 strength in all  HEENT:No JVD, no masses, Eyes anicteric, No carotid bruits.No lymphadenopathy,  Cardiovascular:Regular rate and rhythm, with normal S1 and S2. No murmurs, rubs,  or gallops. No JVD.  Lungs:  Lungs no wheezing dec bs at basesl.  Abdomen:  Normoactive  Soft, flat, slight ruq pain, ecchymosis around incisional site   Skin:  Warm, dry, well-perfused. No rashes or other lesions.   Extremities:  2+ pulses in upper and lower extremities. No lower extremity pain or  edema; legs are symmetric in appearance.                          11.5   7.00  )-----------( 85       ( 2022 05:25 )             33.9           138  |  106  |  27<H>  ----------------------------<  129<H>  4.6   |  28  |  1.54<H>    Ca    9.2      2022 05:25  Phos  2.3       Mg     2.2         TPro  7.3  /  Alb  3.1<L>  /  TBili  1.6<H>  /  DBili  0.5<H>  /  AST  31  /  ALT  28  /  AlkPhos  88      Urinalysis Basic - ( 2022 21:22 )    Color: Yellow / Appearance: Clear / S.005 / pH: x  Gluc: x / Ketone: Negative  / Bili: Negative / Urobili: Negative   Blood: x / Protein: Negative / Nitrite: Negative   Leuk Esterase: Negative / RBC: x / WBC x   Sq Epi: x / Non Sq Epi: x / Bacteria: x    DVT Prophylaxis:     Heparin subq                                                            Advanced Directives: Full Code

## 2022-04-26 NOTE — CONSULT NOTE ADULT - ASSESSMENT
51M with alcoholic cirrhosis, decompensated with encephalopathy possibly related to med noncompliance or worsened by narcotics/polypharmacy.    Rec:  -trend ammonia  -lactulose TID titrate to 2-3 loose stools daily  -cont Xifaxan BID  -trend Cr  -avoid over-administration of fluids  -will need screening EGD in future when stable  -EtOH cessation  -check AFP  -outpatient follow up with transplant hepatology  -call placed to wife Wen

## 2022-04-26 NOTE — PROGRESS NOTE ADULT - ASSESSMENT
Patient with newly diagnosed cirrhosis, hx. ckd  admitted with confusion, hepatic encephalopathy,  on rifaxin and lactulsoe still inc ammonia but awake, continue rifaxin and lactulose  CKD with baseline creatinine 1.5 to 2, got contrast, creainine improved with hydration  slight hypoxia, no distress, chetan atelectasis from surgery uses bipap at home, would wear at night  was in ICU for transient hypotension, likely from pain meds and dehydration, no evidence of sepsis, may transfer to floor

## 2022-04-27 ENCOUNTER — TRANSCRIPTION ENCOUNTER (OUTPATIENT)
Age: 52
End: 2022-04-27

## 2022-04-27 LAB
AMMONIA BLD-MCNC: 47 UMOL/L — HIGH (ref 11–32)
ANION GAP SERPL CALC-SCNC: 5 MMOL/L — SIGNIFICANT CHANGE UP (ref 5–17)
BILIRUB SERPL-MCNC: 1.5 MG/DL — HIGH (ref 0.2–1.2)
BUN SERPL-MCNC: 17 MG/DL — SIGNIFICANT CHANGE UP (ref 7–23)
CALCIUM SERPL-MCNC: 9.3 MG/DL — SIGNIFICANT CHANGE UP (ref 8.5–10.1)
CHLORIDE SERPL-SCNC: 106 MMOL/L — SIGNIFICANT CHANGE UP (ref 96–108)
CO2 SERPL-SCNC: 27 MMOL/L — SIGNIFICANT CHANGE UP (ref 22–31)
CREAT SERPL-MCNC: 1.16 MG/DL — SIGNIFICANT CHANGE UP (ref 0.5–1.3)
CULTURE RESULTS: SIGNIFICANT CHANGE UP
EGFR: 75 ML/MIN/1.73M2 — SIGNIFICANT CHANGE UP
GLUCOSE SERPL-MCNC: 112 MG/DL — HIGH (ref 70–99)
HCT VFR BLD CALC: 35.7 % — LOW (ref 39–50)
HGB BLD-MCNC: 12 G/DL — LOW (ref 13–17)
INR BLD: 1.4 RATIO — HIGH (ref 0.88–1.16)
MCHC RBC-ENTMCNC: 33.1 PG — SIGNIFICANT CHANGE UP (ref 27–34)
MCHC RBC-ENTMCNC: 33.6 GM/DL — SIGNIFICANT CHANGE UP (ref 32–36)
MCV RBC AUTO: 98.6 FL — SIGNIFICANT CHANGE UP (ref 80–100)
MELD SCORE WITH DIALYSIS: 25 POINTS — SIGNIFICANT CHANGE UP
MELD SCORE WITHOUT DIALYSIS: 13 POINTS — SIGNIFICANT CHANGE UP
PLATELET # BLD AUTO: 81 K/UL — LOW (ref 150–400)
POTASSIUM SERPL-MCNC: 4.7 MMOL/L — SIGNIFICANT CHANGE UP (ref 3.5–5.3)
POTASSIUM SERPL-SCNC: 4.7 MMOL/L — SIGNIFICANT CHANGE UP (ref 3.5–5.3)
PROTHROM AB SERPL-ACNC: 16.3 SEC — HIGH (ref 10.5–13.4)
RBC # BLD: 3.62 M/UL — LOW (ref 4.2–5.8)
RBC # FLD: 12 % — SIGNIFICANT CHANGE UP (ref 10.3–14.5)
SODIUM SERPL-SCNC: 138 MMOL/L — SIGNIFICANT CHANGE UP (ref 135–145)
SPECIMEN SOURCE: SIGNIFICANT CHANGE UP
WBC # BLD: 6.07 K/UL — SIGNIFICANT CHANGE UP (ref 3.8–10.5)
WBC # FLD AUTO: 6.07 K/UL — SIGNIFICANT CHANGE UP (ref 3.8–10.5)

## 2022-04-27 PROCEDURE — 99233 SBSQ HOSP IP/OBS HIGH 50: CPT | Mod: GC

## 2022-04-27 RX ORDER — INSULIN LISPRO 100/ML
VIAL (ML) SUBCUTANEOUS AT BEDTIME
Refills: 0 | Status: DISCONTINUED | OUTPATIENT
Start: 2022-04-27 | End: 2022-05-03

## 2022-04-27 RX ORDER — DEXTROSE 50 % IN WATER 50 %
12.5 SYRINGE (ML) INTRAVENOUS ONCE
Refills: 0 | Status: DISCONTINUED | OUTPATIENT
Start: 2022-04-27 | End: 2022-05-03

## 2022-04-27 RX ORDER — METHYLPHENIDATE HCL 5 MG
1 TABLET ORAL
Qty: 0 | Refills: 0 | DISCHARGE

## 2022-04-27 RX ORDER — LOSARTAN POTASSIUM 100 MG/1
1 TABLET, FILM COATED ORAL
Qty: 0 | Refills: 0 | DISCHARGE

## 2022-04-27 RX ORDER — DEXTROSE 50 % IN WATER 50 %
25 SYRINGE (ML) INTRAVENOUS ONCE
Refills: 0 | Status: DISCONTINUED | OUTPATIENT
Start: 2022-04-27 | End: 2022-05-03

## 2022-04-27 RX ORDER — GLUCAGON INJECTION, SOLUTION 0.5 MG/.1ML
1 INJECTION, SOLUTION SUBCUTANEOUS ONCE
Refills: 0 | Status: DISCONTINUED | OUTPATIENT
Start: 2022-04-27 | End: 2022-05-03

## 2022-04-27 RX ORDER — DEXTROSE 50 % IN WATER 50 %
15 SYRINGE (ML) INTRAVENOUS ONCE
Refills: 0 | Status: DISCONTINUED | OUTPATIENT
Start: 2022-04-27 | End: 2022-05-03

## 2022-04-27 RX ORDER — METOPROLOL TARTRATE 50 MG
1 TABLET ORAL
Qty: 0 | Refills: 0 | DISCHARGE

## 2022-04-27 RX ORDER — INSULIN LISPRO 100/ML
VIAL (ML) SUBCUTANEOUS
Refills: 0 | Status: DISCONTINUED | OUTPATIENT
Start: 2022-04-27 | End: 2022-05-03

## 2022-04-27 RX ORDER — SODIUM CHLORIDE 9 MG/ML
1000 INJECTION, SOLUTION INTRAVENOUS
Refills: 0 | Status: DISCONTINUED | OUTPATIENT
Start: 2022-04-27 | End: 2022-05-03

## 2022-04-27 RX ORDER — PETROLATUM,WHITE
1 JELLY (GRAM) TOPICAL DAILY
Refills: 0 | Status: DISCONTINUED | OUTPATIENT
Start: 2022-04-27 | End: 2022-05-03

## 2022-04-27 RX ORDER — FUROSEMIDE 40 MG
1 TABLET ORAL
Qty: 0 | Refills: 0 | DISCHARGE

## 2022-04-27 RX ADMIN — HEPARIN SODIUM 5000 UNIT(S): 5000 INJECTION INTRAVENOUS; SUBCUTANEOUS at 09:13

## 2022-04-27 RX ADMIN — LACTULOSE 20 GRAM(S): 10 SOLUTION ORAL at 17:27

## 2022-04-27 RX ADMIN — LACTULOSE 20 GRAM(S): 10 SOLUTION ORAL at 12:00

## 2022-04-27 RX ADMIN — Medication 50 MILLIGRAM(S): at 21:03

## 2022-04-27 RX ADMIN — LACTULOSE 20 GRAM(S): 10 SOLUTION ORAL at 09:13

## 2022-04-27 RX ADMIN — HEPARIN SODIUM 5000 UNIT(S): 5000 INJECTION INTRAVENOUS; SUBCUTANEOUS at 21:02

## 2022-04-27 RX ADMIN — QUETIAPINE FUMARATE 50 MILLIGRAM(S): 200 TABLET, FILM COATED ORAL at 21:03

## 2022-04-27 RX ADMIN — OXYCODONE HYDROCHLORIDE 15 MILLIGRAM(S): 5 TABLET ORAL at 19:40

## 2022-04-27 RX ADMIN — HEPARIN SODIUM 5000 UNIT(S): 5000 INJECTION INTRAVENOUS; SUBCUTANEOUS at 15:10

## 2022-04-27 RX ADMIN — ESCITALOPRAM OXALATE 20 MILLIGRAM(S): 10 TABLET, FILM COATED ORAL at 09:14

## 2022-04-27 RX ADMIN — OXYCODONE HYDROCHLORIDE 15 MILLIGRAM(S): 5 TABLET ORAL at 13:25

## 2022-04-27 RX ADMIN — LACTULOSE 20 GRAM(S): 10 SOLUTION ORAL at 23:09

## 2022-04-27 RX ADMIN — OXYCODONE HYDROCHLORIDE 15 MILLIGRAM(S): 5 TABLET ORAL at 12:31

## 2022-04-27 RX ADMIN — OXYCODONE HYDROCHLORIDE 15 MILLIGRAM(S): 5 TABLET ORAL at 05:34

## 2022-04-27 NOTE — PROGRESS NOTE ADULT - ASSESSMENT
# Distributive shock   -Recovered  -Pt was treated in ICU   -Received Levophed and weened off with midodrine   - Pts bp is stable   -With hold Lasix and metoprolol   -Close observation    #Hepatic encephalopathy  - Improved  - CT Lactulose 20mg po bid  -Ct rifaximin  -Ammonia level -47  -Gi consult appreciated  -Nutrition consult  requested   -follow-up am labs    #Acute respiratory distress  -Improved  -Probably due to atelectasis     #LESLI  -Improved     #DM-2  -Newly diagnosed  -HBA1C- 7.5  -Sliding scale    #DVT PRO  -Heparin    -Case was discussed with Dr. Talavera

## 2022-04-27 NOTE — DISCHARGE NOTE PROVIDER - HOSPITAL COURSE
52 YO M w/pmhx of Cirrhosis, CKD, GERD, RA, Lyme disease, HTN, obesity who presents to ED w/confusion and AMS. In the ED, labs were significant for renal insufficiency baseline SCr 1.5-2, today 1.9. The patient underwent CTA to r/o PE as the patient was hypoxic on arrival. CTA chest negative received fluid for concern of LISSETH. The patient's BP labile, and he was started on Levophed. Zosyn was given in ED for SIRS criteria. The patient received 2.5L of fluid in ED. His lactate is negative, and his Ammonia was 79. Pt was diagnosed with Distributive shock 2/2 to cirrhosis, hepatic encephalopathy, and atelectasis. Pt was admitted and treated in ICU. In ICU, sepsis was ruled out, hypotension was treated with midodrine, and hepatic encephalopathy was treated with Lactulose and rifaximin. After stabilizing the pt, he was transferred to Med/Surg. On the duenas, he continued to receive treatment for metabolic encephalopathy, and he is doing much better. Pt is back to his baseline. His labs improved significantly. After discharge, he must follow up with his GI and transplant hepatology for further management. Pt has to take Lactulose and rifaximin after release. Metoprolol, Losartan, and Furosemide were withheld due to hypotension.      Today, pt was seen by his bedside, not in acute distress, vitals and examination was stable. Pt is medically stable for discharge.     Vital Signs Last 24 Hrs  T(C): 36.8 (27 Apr 2022 08:31), Max: 36.8 (26 Apr 2022 23:36)  T(F): 98.2 (27 Apr 2022 08:31), Max: 98.2 (26 Apr 2022 23:36)  HR: 78 (27 Apr 2022 08:31) (69 - 94)  BP: 124/66 (27 Apr 2022 08:31) (106/58 - 145/58)  BP(mean): 76 (26 Apr 2022 20:00) (69 - 107)  RR: 18 (27 Apr 2022 08:31) (10 - 22)  SpO2: 95% (27 Apr 2022 08:31) (91% - 96%)    REVIEW OF SYSTEMS:    CONSTITUTIONAL: No weakness, fevers or chills  EYES/ENT: No visual changes;  No vertigo or throat pain   NECK: No pain or stiffness  RESPIRATORY: No cough, wheezing, hemoptysis; No shortness of breath  CARDIOVASCULAR: No chest pain or palpitations  GASTROINTESTINAL: No abdominal or epigastric pain. No nausea, vomiting, or hematemesis; No diarrhea or constipation. No melena or hematochezia.  GENITOURINARY: No dysuria, frequency or hematuria  NEUROLOGICAL: No numbness or weakness  SKIN: No itching, rashes    Examination-    GENERAL: NAD, lying in bed comfortably  HEAD:  Atraumatic, Normocephalic  EYES: EOMI, PERRLA, conjunctiva and sclera clear  ENT: Moist mucous membranes  NECK: Supple, No JVD  CHEST/LUNG: Clear to auscultation bilaterally; No rales, rhonchi, wheezing, or rubs. Unlabored respirations  HEART: Regular rate and rhythm; No murmurs, rubs, or gallops  ABDOMEN: Bowel sounds present; Post-op scar noted in the right lateral abdomen.  EXTREMITIES:  2+ Peripheral Pulses, brisk capillary refill. No clubbing, cyanosis, or edema  NERVOUS SYSTEM:  Alert & Oriented X3, speech clear. No deficits   MSK: FROM all 4 extremities, full and equal strength  < from: CT Chest No Cont (04.25.22 @ 17:23) >    IMPRESSION:  *  No acute chest pathology. Bibasilar dependent atelectasis.  *  Interval removal of catheter fragment from the right lateral abdominal   wall.  *  Stable mild perihepatic stranding without hematoma or hemoperitoneum.  *  Cirrhosis and portal hypertension with varices.    < end of copied text >  < from: CT Abdomen and Pelvis No Cont (04.25.22 @ 17:22) >    IMPRESSION:  *  No acute chest pathology. Bibasilar dependent atelectasis.  *  Interval removal of catheter fragment from the right lateral abdominal   wall.  *  Stable mild perihepatic stranding without hematoma or hemoperitoneum.  *  Cirrhosis and portal hypertension with varices.    < end of copied text >                  52 YO M w/pmhx of Cirrhosis, CKD, GERD, RA, Lyme disease, HTN, obesity who presents to ED w/confusion and AMS. In the ED, labs were significant for renal insufficiency baseline SCr 1.5-2, today 1.9. The patient underwent CTA to r/o PE as the patient was hypoxic on arrival. CTA chest negative received fluid for concern of LISSETH. The patient's BP labile, and he was started on Levophed. Zosyn was given in ED for SIRS criteria. The patient received 2.5L of fluid in ED. His lactate is negative, and his Ammonia was 79. Pt was diagnosed with Distributive shock 2/2 to cirrhosis, hepatic encephalopathy, and atelectasis. Pt was admitted and treated in ICU. In ICU, sepsis was ruled out, hypotension was treated with levophed and later weened off with midodrine, and hepatic encephalopathy was treated with Lactulose and rifaximin. After stabilizing the pt, he was transferred to Med/Surg. On the duenas, he continued to receive treatment for metabolic encephalopathy, and he is doing much better. Pt is back to his baseline. His labs improved significantly. After discharge, he must follow up with his GI and transplant hepatology for further management. Pt has to take Lactulose and rifaximin after release. Metoprolol, Losartan, and Furosemide were withheld due to hypotension.      Today, pt was seen by his bedside, not in acute distress, vitals and examination was stable. Pt is medically stable for discharge.     Vital Signs Last 24 Hrs  T(C): 36.8 (27 Apr 2022 08:31), Max: 36.8 (26 Apr 2022 23:36)  T(F): 98.2 (27 Apr 2022 08:31), Max: 98.2 (26 Apr 2022 23:36)  HR: 78 (27 Apr 2022 08:31) (69 - 94)  BP: 124/66 (27 Apr 2022 08:31) (106/58 - 145/58)  BP(mean): 76 (26 Apr 2022 20:00) (69 - 107)  RR: 18 (27 Apr 2022 08:31) (10 - 22)  SpO2: 95% (27 Apr 2022 08:31) (91% - 96%)    REVIEW OF SYSTEMS:    CONSTITUTIONAL: No weakness, fevers or chills  EYES/ENT: No visual changes;  No vertigo or throat pain   NECK: No pain or stiffness  RESPIRATORY: No cough, wheezing, hemoptysis; No shortness of breath  CARDIOVASCULAR: No chest pain or palpitations  GASTROINTESTINAL: No abdominal or epigastric pain. No nausea, vomiting, or hematemesis; No diarrhea or constipation. No melena or hematochezia.  GENITOURINARY: No dysuria, frequency or hematuria  NEUROLOGICAL: No numbness or weakness  SKIN: No itching, rashes    Examination-    GENERAL: NAD, lying in bed comfortably  HEAD:  Atraumatic, Normocephalic  EYES: EOMI, PERRLA, conjunctiva and sclera clear  ENT: Moist mucous membranes  NECK: Supple, No JVD  CHEST/LUNG: Clear to auscultation bilaterally; No rales, rhonchi, wheezing, or rubs. Unlabored respirations  HEART: Regular rate and rhythm; No murmurs, rubs, or gallops  ABDOMEN: Bowel sounds present; Post-op scar noted in the right lateral abdomen.  EXTREMITIES:  2+ Peripheral Pulses, brisk capillary refill. No clubbing, cyanosis, or edema  NERVOUS SYSTEM:  Alert & Oriented X3, speech clear. No deficits   MSK: FROM all 4 extremities, full and equal strength  < from: CT Chest No Cont (04.25.22 @ 17:23) >    IMPRESSION:  *  No acute chest pathology. Bibasilar dependent atelectasis.  *  Interval removal of catheter fragment from the right lateral abdominal   wall.  *  Stable mild perihepatic stranding without hematoma or hemoperitoneum.  *  Cirrhosis and portal hypertension with varices.    < end of copied text >  < from: CT Abdomen and Pelvis No Cont (04.25.22 @ 17:22) >    IMPRESSION:  *  No acute chest pathology. Bibasilar dependent atelectasis.  *  Interval removal of catheter fragment from the right lateral abdominal   wall.  *  Stable mild perihepatic stranding without hematoma or hemoperitoneum.  *  Cirrhosis and portal hypertension with varices.    < end of copied text >                  52 YO M w/pmhx of Cirrhosis, CKD, GERD, RA, Lyme disease, HTN, obesity who presents to ED w/confusion and AMS. In the ED, labs were significant for renal insufficiency baseline SCr 1.5-2, today 1.9. The patient underwent CTA to r/o PE as the patient was hypoxic on arrival. CTA chest negative received fluid for concern of LISSETH. The patient's BP labile, and he was started on Levophed. Zosyn was given in ED for SIRS criteria. The patient received 2.5L of fluid in ED. His lactate is negative, and his Ammonia was 79. Pt was diagnosed with Distributive shock 2/2 to cirrhosis, hepatic encephalopathy, and atelectasis. Pt was admitted and treated in ICU. In ICU, sepsis was ruled out, hypotension was treated with levophed and later weened off with midodrine, and hepatic encephalopathy was treated with Lactulose and rifaximin. After stabilizing the pt, he was transferred to Med/Surg. On the duenas, he continued to receive treatment for metabolic encephalopathy, and he is doing much better. Pt is back to his baseline. His labs improved significantly. After discharge, he must follow up with his GI and transplant hepatology for further management. After discharge, pt has to take Lactulose 30 mg po tid  and rifaximin 1 tab po bid . Metoprolol, Losartan, and Furosemide were withheld due to hypotension.  Pts Hba1c is 7.2 in the hospital, pt was advised to follow-up with the primary care provider for further investigation and management.     Today, pt was seen by his bedside, not in acute distress, vitals and examination was stable. Pt is medically stable for discharge.     Vital Signs Last 24 Hrs  T(C): 36.8 (27 Apr 2022 08:31), Max: 36.8 (26 Apr 2022 23:36)  T(F): 98.2 (27 Apr 2022 08:31), Max: 98.2 (26 Apr 2022 23:36)  HR: 78 (27 Apr 2022 08:31) (69 - 94)  BP: 124/66 (27 Apr 2022 08:31) (106/58 - 145/58)  BP(mean): 76 (26 Apr 2022 20:00) (69 - 107)  RR: 18 (27 Apr 2022 08:31) (10 - 22)  SpO2: 95% (27 Apr 2022 08:31) (91% - 96%)    REVIEW OF SYSTEMS:    CONSTITUTIONAL: No weakness, fevers or chills  EYES/ENT: No visual changes;  No vertigo or throat pain   NECK: No pain or stiffness  RESPIRATORY: No cough, wheezing, hemoptysis; No shortness of breath  CARDIOVASCULAR: No chest pain or palpitations  GASTROINTESTINAL: No abdominal or epigastric pain. No nausea, vomiting, or hematemesis; No diarrhea or constipation. No melena or hematochezia.  GENITOURINARY: No dysuria, frequency or hematuria  NEUROLOGICAL: No numbness or weakness  SKIN: No itching, rashes    Examination-    GENERAL: NAD, lying in bed comfortably  HEAD:  Atraumatic, Normocephalic  EYES: EOMI, PERRLA, conjunctiva and sclera clear  ENT: Moist mucous membranes  NECK: Supple, No JVD  CHEST/LUNG: Clear to auscultation bilaterally; No rales, rhonchi, wheezing, or rubs. Unlabored respirations  HEART: Regular rate and rhythm; No murmurs, rubs, or gallops  ABDOMEN: Bowel sounds present; Post-op scar noted in the right lateral abdomen.  EXTREMITIES:  2+ Peripheral Pulses, brisk capillary refill. No clubbing, cyanosis, or edema  NERVOUS SYSTEM:  Alert & Oriented X3, speech clear. No deficits   MSK: FROM all 4 extremities, full and equal strength  < from: CT Chest No Cont (04.25.22 @ 17:23) >    IMPRESSION:  *  No acute chest pathology. Bibasilar dependent atelectasis.  *  Interval removal of catheter fragment from the right lateral abdominal   wall.  *  Stable mild perihepatic stranding without hematoma or hemoperitoneum.  *  Cirrhosis and portal hypertension with varices.    < end of copied text >  < from: CT Abdomen and Pelvis No Cont (04.25.22 @ 17:22) >    IMPRESSION:  *  No acute chest pathology. Bibasilar dependent atelectasis.  *  Interval removal of catheter fragment from the right lateral abdominal   wall.  *  Stable mild perihepatic stranding without hematoma or hemoperitoneum.  *  Cirrhosis and portal hypertension with varices.    < end of copied text >                  52 YO M w/pmhx of Cirrhosis, CKD, GERD, RA, Lyme disease, HTN, obesity who presents to ED w/confusion and AMS. In the ED, labs were significant for renal insufficiency baseline SCr 1.5-2, today 1.9. The patient underwent CTA to r/o PE as the patient was hypoxic on arrival. CTA chest negative received fluid for concern of LISSETH. The patient's BP labile, and he was started on Levophed. Zosyn was given in ED for SIRS criteria. The patient received 2.5L of fluid in ED. His lactate is negative, and his Ammonia was 79. Pt was diagnosed with Distributive shock 2/2 to cirrhosis, hepatic encephalopathy, and atelectasis. Pt was admitted and treated in ICU. In ICU, sepsis was ruled out, hypotension was treated with levophed and later weened off with midodrine, and hepatic encephalopathy was treated with Lactulose and rifaximin. After stabilizing the pt, he was transferred to Med/Surg. On the duenas, he continued to receive treatment for metabolic encephalopathy, and he is doing much better. Pt is back to his baseline. His labs improved significantly. After discharge, he must follow up with his GI and transplant hepatology for further management. After discharge, pt has to take Lactulose 30 mg PO tid and rifaximin one tab PO bid. Metoprolol, Losartan, and Furosemide were withheld due to hypotension. Pts Hba1c is 7.2 in the hospital, and pt was advised to follow up with the primary care provider for further investigation and management. Pt is being discharged to [facility] alcohol treatment.    Today, pt was seen by his bedside, not in acute distress, vitals and examination was stable. Pt is medically stable for discharge.     Vital Signs Last 24 Hrs  T(C): 36.8 (27 Apr 2022 08:31), Max: 36.8 (26 Apr 2022 23:36)  T(F): 98.2 (27 Apr 2022 08:31), Max: 98.2 (26 Apr 2022 23:36)  HR: 78 (27 Apr 2022 08:31) (69 - 94)  BP: 124/66 (27 Apr 2022 08:31) (106/58 - 145/58)  BP(mean): 76 (26 Apr 2022 20:00) (69 - 107)  RR: 18 (27 Apr 2022 08:31) (10 - 22)  SpO2: 95% (27 Apr 2022 08:31) (91% - 96%)    REVIEW OF SYSTEMS:    CONSTITUTIONAL: No weakness, fevers or chills  EYES/ENT: No visual changes;  No vertigo or throat pain   NECK: No pain or stiffness  RESPIRATORY: No cough, wheezing, hemoptysis; No shortness of breath  CARDIOVASCULAR: No chest pain or palpitations  GASTROINTESTINAL: No abdominal or epigastric pain. No nausea, vomiting, or hematemesis; No diarrhea or constipation. No melena or hematochezia.  GENITOURINARY: No dysuria, frequency or hematuria  NEUROLOGICAL: No numbness or weakness  SKIN: No itching, rashes    Examination-    GENERAL: NAD, lying in bed comfortably  HEAD:  Atraumatic, Normocephalic  EYES: EOMI, PERRLA, conjunctiva and sclera clear  ENT: Moist mucous membranes  NECK: Supple, No JVD  CHEST/LUNG: Clear to auscultation bilaterally; No rales, rhonchi, wheezing, or rubs. Unlabored respirations  HEART: Regular rate and rhythm; No murmurs, rubs, or gallops  ABDOMEN: Bowel sounds present; Post-op scar noted in the right lateral abdomen.  EXTREMITIES:  2+ Peripheral Pulses, brisk capillary refill. No clubbing, cyanosis, or edema  NERVOUS SYSTEM:  Alert & Oriented X3, speech clear. No deficits   MSK: FROM all 4 extremities, full and equal strength  < from: CT Chest No Cont (04.25.22 @ 17:23) >    IMPRESSION:  *  No acute chest pathology. Bibasilar dependent atelectasis.  *  Interval removal of catheter fragment from the right lateral abdominal   wall.  *  Stable mild perihepatic stranding without hematoma or hemoperitoneum.  *  Cirrhosis and portal hypertension with varices.    < end of copied text >  < from: CT Abdomen and Pelvis No Cont (04.25.22 @ 17:22) >    IMPRESSION:  *  No acute chest pathology. Bibasilar dependent atelectasis.  *  Interval removal of catheter fragment from the right lateral abdominal   wall.  *  Stable mild perihepatic stranding without hematoma or hemoperitoneum.  *  Cirrhosis and portal hypertension with varices.    < end of copied text >                  52 YO M w/pmhx of Cirrhosis, CKD, GERD, RA, Lyme disease, HTN, obesity who presents to ED w/confusion and AMS. In the ED, labs were significant for renal insufficiency baseline SCr 1.5-2, today 1.9. The patient underwent CTA to r/o PE as the patient was hypoxic on arrival. CTA chest negative received fluid for concern of LISSETH. The patient's BP labile, and he was started on Levophed. Zosyn was given in ED for SIRS criteria. The patient received 2.5L of fluid in ED. His lactate is negative, and his Ammonia was 79. Pt was diagnosed with Distributive shock 2/2 to cirrhosis, hepatic encephalopathy, and atelectasis. Pt was admitted and treated in ICU. In ICU, sepsis was ruled out, hypotension was treated with levophed and later weened off with midodrine, and hepatic encephalopathy was treated with Lactulose and rifaximin. After stabilizing the pt, he was transferred to Med/Surg. On the duenas, he continued to receive treatment for metabolic encephalopathy, and he is doing much better. Pt is back to his baseline. His labs improved significantly. After discharge, he must follow up with his GI and transplant hepatology for further management. After discharge, pt has to take Lactulose 30 mg PO tid and rifaximin one tab PO bid. Metoprolol, Losartan, and Furosemide were withheld due to hypotension. Pts Hba1c is 7.2 in the hospital, and pt was advised to follow up with the primary care provider for further investigation and management. Pt is being discharged to [facility] alcohol treatment.    Today, pt was seen by his bedside, not in acute distress, vitals and examination was stable. Pt is medically stable for discharge.     Vital Signs Last 24 Hrs  T(C): 36.7 (03 May 2022 07:57), Max: 36.9 (02 May 2022 15:11)  T(F): 98.1 (03 May 2022 07:57), Max: 98.4 (02 May 2022 15:11)  HR: 67 (03 May 2022 07:57) (67 - 77)  BP: 153/72 (03 May 2022 07:57) (131/69 - 153/72)  BP(mean): --  RR: 18 (03 May 2022 07:57) (18 - 18)  SpO2: 96% (03 May 2022 07:57) (96% - 96%)    REVIEW OF SYSTEMS:    CONSTITUTIONAL: No weakness, fevers or chills  EYES/ENT: No visual changes;  No vertigo or throat pain   NECK: No pain or stiffness  RESPIRATORY: No cough, wheezing, hemoptysis; No shortness of breath  CARDIOVASCULAR: No chest pain or palpitations  GASTROINTESTINAL: No abdominal or epigastric pain. No nausea, vomiting, or hematemesis; No diarrhea or constipation. No melena or hematochezia.  GENITOURINARY: No dysuria, frequency or hematuria  NEUROLOGICAL: No numbness or weakness  SKIN: No itching, rashes    Examination-    GENERAL: NAD, lying in bed comfortably  HEAD:  Atraumatic, Normocephalic  EYES: EOMI, PERRLA, conjunctiva and sclera clear  ENT: Moist mucous membranes  NECK: Supple, No JVD  CHEST/LUNG: Clear to auscultation bilaterally; No rales, rhonchi, wheezing, or rubs. Unlabored respirations  HEART: Regular rate and rhythm; No murmurs, rubs, or gallops  ABDOMEN: Bowel sounds present; Post-op scar noted in the right lateral abdomen.  EXTREMITIES:  2+ Peripheral Pulses, brisk capillary refill. No clubbing, cyanosis, or edema  NERVOUS SYSTEM:  Alert & Oriented X3, speech clear. No deficits   MSK: FROM all 4 extremities, full and equal strength  < from: CT Chest No Cont (04.25.22 @ 17:23) >    IMPRESSION:  *  No acute chest pathology. Bibasilar dependent atelectasis.  *  Interval removal of catheter fragment from the right lateral abdominal   wall.  *  Stable mild perihepatic stranding without hematoma or hemoperitoneum.  *  Cirrhosis and portal hypertension with varices.    < end of copied text >  < from: CT Abdomen and Pelvis No Cont (04.25.22 @ 17:22) >    IMPRESSION:  *  No acute chest pathology. Bibasilar dependent atelectasis.  *  Interval removal of catheter fragment from the right lateral abdominal   wall.  *  Stable mild perihepatic stranding without hematoma or hemoperitoneum.  *  Cirrhosis and portal hypertension with varices.    < end of copied text >

## 2022-04-27 NOTE — PROGRESS NOTE ADULT - ASSESSMENT
51M with alcoholic cirrhosis, decompensated with encephalopathy possibly related to med noncompliance or worsened by narcotics/polypharmacy.  Improved ammonia today.    Rec:  -trend ammonia  -lactulose TID titrate to 2-3 loose stools daily  -cont Xifaxan BID  -trend Cr  -will need screening EGD in future when stable  -EtOH cessation  -f/u AFP  -outpatient follow up with transplant hepatology  -call placed to wife Wen again today to discuss

## 2022-04-27 NOTE — DISCHARGE NOTE PROVIDER - NSDCCAREPROVSEEN_GEN_ALL_CORE_FT
Manpreet, Tpi Hahn, Rashmi Mckinney, Danni Garcia, Sunny Dillard, Lucy Talavera, Reece De La Vega, Bear Castillo, Jose Casanova, Ren Braden, José AHMADI

## 2022-04-27 NOTE — DISCHARGE NOTE PROVIDER - CARE PROVIDER_API CALL
Bear De La Vega)  Gastroenterology; Internal Medicine  [address] [place] [address]  Phone: [phone]  Fax: [phone]  Follow Up Time:     Tip Soriano (DO)  Family Medicine  [address] [place] [address]  Phone: [phone]  Fax: [phone]  Follow Up Time:

## 2022-04-27 NOTE — DISCHARGE NOTE PROVIDER - NSDCMRMEDTOKEN_GEN_ALL_CORE_FT
clonazePAM 0.5 mg oral tablet: 1 tab(s) orally once a day (in the afternoon), As Needed  escitalopram 20 mg oral tablet: 1 tab(s) orally once a day  Flonase 50 mcg/inh nasal spray: 1 spray(s) nasal once a day  lactulose 10 g/15 mL oral syrup: 30 milliliter(s) orally 2 times a day  oxyCODONE 15 mg oral tablet: 1 tab(s) orally every 6 hours  QUEtiapine 50 mg oral tablet: 1 tab(s) orally once a day (at bedtime)  rifAXIMin 550 mg oral tablet: 1 tab(s) orally 2 times a day  traZODone 50 mg oral tablet: 1 tab(s) orally once a day (at bedtime)   clonazePAM 0.5 mg oral tablet: 1 tab(s) orally once a day (in the afternoon), As Needed  escitalopram 20 mg oral tablet: 1 tab(s) orally once a day  Flonase 50 mcg/inh nasal spray: 1 spray(s) nasal once a day  lactulose 10 g/15 mL oral syrup: 30 milliliter(s) orally 3 times a day   oxyCODONE 15 mg oral tablet: 1 tab(s) orally every 6 hours  QUEtiapine 50 mg oral tablet: 1 tab(s) orally once a day (at bedtime)  rifAXIMin 550 mg oral tablet: 1 tab(s) orally 2 times a day  traZODone 50 mg oral tablet: 1 tab(s) orally once a day (at bedtime)   clonazePAM 0.5 mg oral tablet: 1 tab(s) orally once a day (in the afternoon), As Needed  escitalopram 20 mg oral tablet: 1 tab(s) orally once a day  Flonase 50 mcg/inh nasal spray: 1 spray(s) nasal once a day  lactulose 10 g/15 mL oral syrup: 45 milliliter(s) orally 3 times a day  oxyCODONE 15 mg oral tablet: 1 tab(s) orally every 6 hours  QUEtiapine 50 mg oral tablet: 1 tab(s) orally once a day (at bedtime)  rifAXIMin 550 mg oral tablet: 1 tab(s) orally 2 times a day  traZODone 50 mg oral tablet: 1 tab(s) orally once a day (at bedtime)   clonazePAM 0.5 mg oral tablet: 1 tab(s) orally once a day (in the afternoon), As Needed  escitalopram 20 mg oral tablet: 1 tab(s) orally once a day  Flonase 50 mcg/inh nasal spray: 1 spray(s) nasal once a day  lactulose 10 g/15 mL oral syrup: 45 milliliter(s) orally 3 times a day  QUEtiapine 50 mg oral tablet: 1 tab(s) orally once a day (at bedtime)  rifAXIMin 550 mg oral tablet: 1 tab(s) orally 2 times a day  spironolactone 25 mg oral tablet: 1 tab(s) orally 3 times a week   traZODone 50 mg oral tablet: 1 tab(s) orally once a day (at bedtime)

## 2022-04-27 NOTE — PROGRESS NOTE ADULT - SUBJECTIVE AND OBJECTIVE BOX
GI  late note entry-seen 840 am    HPI:  seems more clearheaded  mild asterixis  no abd pain except at incision site  no n/v  taking lactulose and +BM    ROS  As above  Otherwise unremarkable, all systems reviewed    PE:  Vital Signs Last 24 Hrs  T(C): 36.7 (27 Apr 2022 15:04), Max: 36.8 (26 Apr 2022 23:36)  T(F): 98.1 (27 Apr 2022 15:04), Max: 98.2 (26 Apr 2022 23:36)  HR: 84 (27 Apr 2022 15:04) (78 - 94)  BP: 103/78 (27 Apr 2022 15:04) (103/78 - 145/58)  BP(mean): 76 (26 Apr 2022 20:00) (76 - 83)  RR: 18 (27 Apr 2022 15:04) (13 - 18)  SpO2: 94% (27 Apr 2022 15:04) (94% - 96%)    Constitutional: obese, NAD, in chair, +asterixis  Anicteric   Respiratory: CTABL, breathing comfortably  Cardiovascular: S1 and S2, RRR  Gastrointestinal: +BS, soft, non tender, +distended, no mass, healing R sided abd incision noted  Extremities: warm, well perfused, trace edema  Psychiatric: mild slowing  Neuro: moves all extremities, grossly intact, mild asterixis  Skin: facial eczema    LABS:                      CBC Full  -  ( 27 Apr 2022 08:26 )  WBC Count : 6.07 K/uL  RBC Count : 3.62 M/uL  Hemoglobin : 12.0 g/dL  Hematocrit : 35.7 %  Platelet Count - Automated : 81 K/uL  Mean Cell Volume : 98.6 fl  Mean Cell Hemoglobin : 33.1 pg  Mean Cell Hemoglobin Concentration : 33.6 gm/dL  Auto Neutrophil # : x  Auto Lymphocyte # : x  Auto Monocyte # : x  Auto Eosinophil # : x  Auto Basophil # : x  Auto Neutrophil % : x  Auto Lymphocyte % : x  Auto Monocyte % : x  Auto Eosinophil % : x  Auto Basophil % : x

## 2022-04-27 NOTE — PROGRESS NOTE ADULT - SUBJECTIVE AND OBJECTIVE BOX
Mr. Saba is 52 YO M w/pmhx of Cirrhosis, CKD, GERD, RA, Lyme disease, HTN, obesity who presents to ED w/confusion and AMS. Patient recently admitted for liver biopsy, foreign body left in and patient was taken for exploration and successfully removed. Patient examined and is alert and orientated, not as confused as before. Patient's labs are significant for renal insuffiencey, baseline SCr 1.5-2, today 1.9. Patient underwent CTA to r/o PE as patient was hypoxic on arrival. CTA chest negative, received fluid for concern of LISSETH. Patient's BP labile and was started on Levophed. Zosyn was given in ED for SIRS criteria, no clear source of infection. Patient received 2L of fluid in ED, will need another 500cc to meet 30cc/kg bolus based on IBW. Lactate negative. Ammonia 79. Patient states he used to drink almost everyday but does not consider himself a heavy drinker     Subjective- Pt was seen by his bedside, awake and oriented to place and time. Presently, pt is feeling much better and he have no complaints    REVIEW OF SYSTEMS:    CONSTITUTIONAL: No weakness, fevers or chills  EYES/ENT: No visual changes;  No vertigo or throat pain   NECK: No pain or stiffness  RESPIRATORY: No cough, wheezing, hemoptysis; No shortness of breath  CARDIOVASCULAR: No chest pain or palpitations  GASTROINTESTINAL: No abdominal or epigastric pain. No nausea, vomiting, or hematemesis; No diarrhea or constipation. No melena or hematochezia.  GENITOURINARY: No dysuria, frequency or hematuria  NEUROLOGICAL: No numbness or weakness  SKIN: No itching, rashes    VITALS:     GENERAL: NAD, lying in bed comfortably  HEAD:  Atraumatic, Normocephalic  EYES: EOMI, PERRLA, conjunctiva and sclera clear  ENT: Moist mucous membranes  NECK: Supple, No JVD  CHEST/LUNG: Clear to auscultation bilaterally; No rales, rhonchi, wheezing, or rubs. Unlabored respirations  HEART: Regular rate and rhythm; No murmurs, rubs, or gallops  ABDOMEN: Bowel sounds present; Soft, and right lateral upper abdomen post op scar noticed  EXTREMITIES:  2+ Peripheral Pulses, brisk capillary refill. No clubbing, cyanosis, or edema  NERVOUS SYSTEM:  Alert & Oriented X3, speech clear. No deficits   MSK: FROM all 4 extremities, full and equal strength    Vital Signs Last 24 Hrs  T(C): 36.7 (27 Apr 2022 15:04), Max: 36.8 (26 Apr 2022 23:36)  T(F): 98.1 (27 Apr 2022 15:04), Max: 98.2 (26 Apr 2022 23:36)  HR: 84 (27 Apr 2022 15:04) (78 - 84)  BP: 103/78 (27 Apr 2022 15:04) (103/78 - 124/66)  BP(mean): --  RR: 18 (27 Apr 2022 15:04) (18 - 18)  SpO2: 94% (27 Apr 2022 15:04) (94% - 95%)                          12.0   6.07  )-----------( 81       ( 27 Apr 2022 08:26 )             35.7   04-27    138  |  106  |  17  ----------------------------<  112<H>  4.7   |  27  |  1.16    Ca    9.3      27 Apr 2022 08:26  Phos  2.3     04-26  Mg     2.2     04-26    TPro  x   /  Alb  x   /  TBili  1.5<H>  /  DBili  x   /  AST  x   /  ALT  x   /  AlkPhos  x   04-27     Mr. Saba is 52 YO M w/pmhx of Cirrhosis, CKD, GERD, RA, Lyme disease, HTN, obesity who presents to ED w/confusion and AMS. Patient recently admitted for liver biopsy, foreign body left in and patient was taken for exploration and successfully removed. Patient examined and is alert and orientated, not as confused as before. Patient's labs are significant for renal insuffiencey, baseline SCr 1.5-2, today 1.9. Patient underwent CTA to r/o PE as patient was hypoxic on arrival. CTA chest negative, received fluid for concern of LISSETH. Patient's BP labile and was started on Levophed. Zosyn was given in ED for SIRS criteria, no clear source of infection. Patient received 2L of fluid in ED, will need another 500cc to meet 30cc/kg bolus based on IBW. Lactate negative. Ammonia 79. Patient states he used to drink almost everyday but does not consider himself a heavy drinker     Subjective- Pt was seen by his bedside, awake and oriented to place and time. Presently, pt is feeling much better and he have no complaints    REVIEW OF SYSTEMS:    CONSTITUTIONAL: No weakness, fevers or chills  EYES/ENT: No visual changes;  No vertigo or throat pain   NECK: No pain or stiffness  RESPIRATORY: No cough, wheezing, hemoptysis; No shortness of breath  CARDIOVASCULAR: No chest pain or palpitations  GASTROINTESTINAL: No abdominal or epigastric pain. No nausea, vomiting, or hematemesis; No diarrhea or constipation. No melena or hematochezia.  GENITOURINARY: No dysuria, frequency or hematuria  NEUROLOGICAL: No numbness or weakness  SKIN: No itching, rashes    VITALS:     GENERAL: NAD, lying in bed comfortably  HEAD:  Atraumatic, Normocephalic  EYES: EOMI, PERRLA, conjunctiva and sclera clear  ENT: Moist mucous membranes  NECK: Supple, No JVD  CHEST/LUNG: Clear to auscultation bilaterally; No rales, rhonchi, wheezing, or rubs. Unlabored respirations  HEART: Regular rate and rhythm; No murmurs, rubs, or gallops  ABDOMEN: Bowel sounds present; Soft, and right lateral upper abdomen post op scar noticed  EXTREMITIES:  2+ Peripheral Pulses, brisk capillary refill. No clubbing, cyanosis, or edema  NERVOUS SYSTEM:  Alert & Oriented X3, speech clear. No deficits   MSK: FROM all 4 extremities, full and equal strength    Vital Signs Last 24 Hrs  T(C): 36.7 (27 Apr 2022 15:04), Max: 36.8 (26 Apr 2022 23:36)  T(F): 98.1 (27 Apr 2022 15:04), Max: 98.2 (26 Apr 2022 23:36)  HR: 84 (27 Apr 2022 15:04) (78 - 84)  BP: 103/78 (27 Apr 2022 15:04) (103/78 - 124/66)  BP(mean): --  RR: 18 (27 Apr 2022 15:04) (18 - 18)  SpO2: 94% (27 Apr 2022 15:04) (94% - 95%)            Labs-              12.0   6.07  )-----------( 81       ( 27 Apr 2022 08:26 )             35.7   04-27    138  |  106  |  17  ----------------------------<  112<H>  4.7   |  27  |  1.16    Ca    9.3      27 Apr 2022 08:26  Phos  2.3     04-26  Mg     2.2     04-26    TPro  x   /  Alb  x   /  TBili  1.5<H>  /  DBili  x   /  AST  x   /  ALT  x   /  AlkPhos  x   04-27    MEDICATIONS  (STANDING):  dextrose 5%. 1000 milliLiter(s) (50 mL/Hr) IV Continuous <Continuous>  dextrose 5%. 1000 milliLiter(s) (100 mL/Hr) IV Continuous <Continuous>  dextrose 50% Injectable 25 Gram(s) IV Push once  dextrose 50% Injectable 12.5 Gram(s) IV Push once  dextrose 50% Injectable 25 Gram(s) IV Push once  escitalopram 20 milliGRAM(s) Oral daily  glucagon  Injectable 1 milliGRAM(s) IntraMuscular once  heparin   Injectable 5000 Unit(s) SubCutaneous every 8 hours  insulin lispro (ADMELOG) corrective regimen sliding scale   SubCutaneous three times a day before meals  insulin lispro (ADMELOG) corrective regimen sliding scale   SubCutaneous at bedtime  lactulose Syrup 20 Gram(s) Oral four times a day  QUEtiapine 50 milliGRAM(s) Oral at bedtime  rifAXIMin 550 milliGRAM(s) Oral two times a day  traZODone 50 milliGRAM(s) Oral at bedtime    MEDICATIONS  (PRN):  AQUAPHOR (petrolatum Ointment) 1 Application(s) Topical daily PRN rash  clonazePAM  Tablet 0.5 milliGRAM(s) Oral daily PRN anxiety  dextrose Oral Gel 15 Gram(s) Oral once PRN Blood Glucose LESS THAN 70 milliGRAM(s)/deciliter  oxyCODONE    IR 15 milliGRAM(s) Oral every 6 hours PRN Moderate Pain (4 - 6)    IMPRESSION:  *  No acute chest pathology. Bibasilar dependent atelectasis.  *  Interval removal of catheter fragment from the right lateral abdominal   wall.  *  Stable mild perihepatic stranding without hematoma or hemoperitoneum.  *  Cirrhosis and portal hypertension with varices.

## 2022-04-27 NOTE — DISCHARGE NOTE PROVIDER - DETAILS OF MALNUTRITION DIAGNOSIS/DIAGNOSES
This patient has been assessed with a concern for Malnutrition and was treated during this hospitalization for the following Nutrition diagnosis/diagnoses:     -  04/28/2022: Severe protein-calorie malnutrition

## 2022-04-27 NOTE — DISCHARGE NOTE PROVIDER - NSDCCPCAREPLAN_GEN_ALL_CORE_FT
PRINCIPAL DISCHARGE DIAGNOSIS  Diagnosis: Hepatic encephalopathy  Assessment and Plan of Treatment: You have liver failure and you were treated with lactulose and rifaxamin. After discharge you should continue to take Lactulose 20mg three times a day  and rifaxamine two times a day. Please follo-up with your Gasteroenterologist for further management of your liver failure.      SECONDARY DISCHARGE DIAGNOSES  Diagnosis: Hypotension  Assessment and Plan of Treatment: Your blood pressure was very low  . You were treated with midodrine and Levophed. Metoprolol, Losartan, and Furosemide were withheld due to hypotension.  After discharge please follow-up with your PCP for future managemen.     PRINCIPAL DISCHARGE DIAGNOSIS  Diagnosis: Hepatic encephalopathy  Assessment and Plan of Treatment: You have liver failure and you were treated with lactulose and rifaxamin. After discharge you should continue to take Lactulose 30mg three times a day  and rifaxamine two times a day. Please follow-up with your Gastroenterologist for further management of your liver failure.      SECONDARY DISCHARGE DIAGNOSES  Diagnosis: Hypotension  Assessment and Plan of Treatment: Your blood pressure was very low. You were treated with midodrine and Levophed. Metoprolol, Losartan, and Furosemide were withheld due to hypotension. After discharge please follow-up with your PCP for future managemen.    Diagnosis: DM (diabetes mellitus)  Assessment and Plan of Treatment: Your HBA1C is 7.2. You have to follow a proper diet to control your blood sugars. Please follow up with your primary care provider for further investigation and management.     PRINCIPAL DISCHARGE DIAGNOSIS  Diagnosis: Hepatic encephalopathy  Assessment and Plan of Treatment: You have liver failure and you were treated with lactulose and rifaxamin. After discharge you should continue to take Lactulose 30mg three times a day,spirinolactone 25 mg po 3 three times a week   and rifaxamine two times a day. Please follow-up with your Gastroenterologist for further management of your liver failure.      SECONDARY DISCHARGE DIAGNOSES  Diagnosis: Hypotension  Assessment and Plan of Treatment: Your blood pressure was very low. You were treated with midodrine and Levophed. Metoprolol, Losartan, and Furosemide were withheld due to hypotension. After discharge please follow-up with your PCP for future managemen.    Diagnosis: DM (diabetes mellitus)  Assessment and Plan of Treatment: Your HBA1C is 7.2. You have to follow a proper diet to control your blood sugars. Please follow up with your primary care provider for further investigation and management.

## 2022-04-28 LAB
ALBUMIN SERPL ELPH-MCNC: 3 G/DL — LOW (ref 3.3–5)
ALP SERPL-CCNC: 85 U/L — SIGNIFICANT CHANGE UP (ref 40–120)
ALT FLD-CCNC: 26 U/L — SIGNIFICANT CHANGE UP (ref 12–78)
AMMONIA BLD-MCNC: 62 UMOL/L — HIGH (ref 11–32)
ANION GAP SERPL CALC-SCNC: 6 MMOL/L — SIGNIFICANT CHANGE UP (ref 5–17)
AST SERPL-CCNC: 27 U/L — SIGNIFICANT CHANGE UP (ref 15–37)
BILIRUB SERPL-MCNC: 1.7 MG/DL — HIGH (ref 0.2–1.2)
BUN SERPL-MCNC: 12 MG/DL — SIGNIFICANT CHANGE UP (ref 7–23)
CALCIUM SERPL-MCNC: 9.2 MG/DL — SIGNIFICANT CHANGE UP (ref 8.5–10.1)
CHLORIDE SERPL-SCNC: 106 MMOL/L — SIGNIFICANT CHANGE UP (ref 96–108)
CO2 SERPL-SCNC: 26 MMOL/L — SIGNIFICANT CHANGE UP (ref 22–31)
CREAT SERPL-MCNC: 1.03 MG/DL — SIGNIFICANT CHANGE UP (ref 0.5–1.3)
EGFR: 87 ML/MIN/1.73M2 — SIGNIFICANT CHANGE UP
GLUCOSE SERPL-MCNC: 111 MG/DL — HIGH (ref 70–99)
HCT VFR BLD CALC: 32.7 % — LOW (ref 39–50)
HGB BLD-MCNC: 11.2 G/DL — LOW (ref 13–17)
MCHC RBC-ENTMCNC: 33 PG — SIGNIFICANT CHANGE UP (ref 27–34)
MCHC RBC-ENTMCNC: 34.3 GM/DL — SIGNIFICANT CHANGE UP (ref 32–36)
MCV RBC AUTO: 96.5 FL — SIGNIFICANT CHANGE UP (ref 80–100)
PLATELET # BLD AUTO: 81 K/UL — LOW (ref 150–400)
POTASSIUM SERPL-MCNC: 4.2 MMOL/L — SIGNIFICANT CHANGE UP (ref 3.5–5.3)
POTASSIUM SERPL-SCNC: 4.2 MMOL/L — SIGNIFICANT CHANGE UP (ref 3.5–5.3)
PROT SERPL-MCNC: 7 GM/DL — SIGNIFICANT CHANGE UP (ref 6–8.3)
RBC # BLD: 3.39 M/UL — LOW (ref 4.2–5.8)
RBC # FLD: 12 % — SIGNIFICANT CHANGE UP (ref 10.3–14.5)
SODIUM SERPL-SCNC: 138 MMOL/L — SIGNIFICANT CHANGE UP (ref 135–145)
WBC # BLD: 5.43 K/UL — SIGNIFICANT CHANGE UP (ref 3.8–10.5)
WBC # FLD AUTO: 5.43 K/UL — SIGNIFICANT CHANGE UP (ref 3.8–10.5)

## 2022-04-28 PROCEDURE — 99233 SBSQ HOSP IP/OBS HIGH 50: CPT | Mod: GC

## 2022-04-28 RX ORDER — LACTULOSE 10 G/15ML
30 SOLUTION ORAL THREE TIMES A DAY
Refills: 0 | Status: DISCONTINUED | OUTPATIENT
Start: 2022-04-28 | End: 2022-05-03

## 2022-04-28 RX ADMIN — QUETIAPINE FUMARATE 50 MILLIGRAM(S): 200 TABLET, FILM COATED ORAL at 21:16

## 2022-04-28 RX ADMIN — OXYCODONE HYDROCHLORIDE 15 MILLIGRAM(S): 5 TABLET ORAL at 12:40

## 2022-04-28 RX ADMIN — Medication 50 MILLIGRAM(S): at 21:16

## 2022-04-28 RX ADMIN — LACTULOSE 30 GRAM(S): 10 SOLUTION ORAL at 21:14

## 2022-04-28 RX ADMIN — OXYCODONE HYDROCHLORIDE 15 MILLIGRAM(S): 5 TABLET ORAL at 19:08

## 2022-04-28 RX ADMIN — LACTULOSE 20 GRAM(S): 10 SOLUTION ORAL at 05:19

## 2022-04-28 RX ADMIN — LACTULOSE 20 GRAM(S): 10 SOLUTION ORAL at 11:52

## 2022-04-28 RX ADMIN — HEPARIN SODIUM 5000 UNIT(S): 5000 INJECTION INTRAVENOUS; SUBCUTANEOUS at 21:13

## 2022-04-28 RX ADMIN — HEPARIN SODIUM 5000 UNIT(S): 5000 INJECTION INTRAVENOUS; SUBCUTANEOUS at 05:19

## 2022-04-28 RX ADMIN — OXYCODONE HYDROCHLORIDE 15 MILLIGRAM(S): 5 TABLET ORAL at 18:08

## 2022-04-28 RX ADMIN — OXYCODONE HYDROCHLORIDE 15 MILLIGRAM(S): 5 TABLET ORAL at 11:40

## 2022-04-28 RX ADMIN — OXYCODONE HYDROCHLORIDE 15 MILLIGRAM(S): 5 TABLET ORAL at 05:19

## 2022-04-28 RX ADMIN — ESCITALOPRAM OXALATE 20 MILLIGRAM(S): 10 TABLET, FILM COATED ORAL at 10:38

## 2022-04-28 NOTE — PHYSICAL THERAPY INITIAL EVALUATION ADULT - PATIENT/FAMILY/SIGNIFICANT OTHER GOALS STATEMENT, PT EVAL
pt reports feeling down ,all the news is doom & gloom ,pt appreciates explanations in laymans terms regarding current status ; he is upset a FB was left behind during liver biopsy procedure ,has local pain at surgical site,+TTP

## 2022-04-28 NOTE — PROGRESS NOTE ADULT - ASSESSMENT
Mr. Saba is 52 YO M w/pmhx of Cirrhosis, CKD, GERD, RA, Lyme disease, HTN, obesity who presents to ED w/confusion and AMS.      # Distributive shock   -Recovered  -Pt was treated in ICU   -Received Levophed and weened off with midodrine   - Pts bp is stable   -With hold Lasix and metoprolol   -Close observation    #Hepatic encephalopathy  - Improved  - CT Lactulose 20mg po bid  -Ct rifaximin  -Ammonia level -62  -Gi consult appreciated  -Nutrition consult  appreciated   -follow-up am labs    #Acute respiratory distress  -Improved  -Probably due to atelectasis     #LESLI  -Improved     #DM-2  -Newly diagnosed  -HBA1C- 7.2  -Sliding scale    #DVT PRO  -Heparin      -Case was discussed with Dr. Woodard

## 2022-04-28 NOTE — PHYSICAL THERAPY INITIAL EVALUATION ADULT - DIAGNOSIS, PT EVAL
distributive shock due to hepatic cirrhosis with labile BP (resolved) acute hepatic encephalopathy /AMS (resolved) ?SIRS ,new onset DM

## 2022-04-28 NOTE — PROGRESS NOTE ADULT - SUBJECTIVE AND OBJECTIVE BOX
GI    HPI:  pt sitting comfortably in bed   mild asterixis  no abd pain except at incision site  no n/v  taking lactulose and +BM    ROS  As above  Otherwise unremarkable, all systems reviewed    PE:  Vital Signs Last 24 Hrs  T(C): 36.7 (28 Apr 2022 07:50), Max: 36.7 (27 Apr 2022 15:04)  T(F): 98.1 (28 Apr 2022 07:50), Max: 98.1 (27 Apr 2022 15:04)  HR: 75 (28 Apr 2022 07:50) (69 - 84)  BP: 124/63 (28 Apr 2022 07:50) (103/78 - 142/75)  BP(mean): --  RR: 18 (28 Apr 2022 07:50) (18 - 18)  SpO2: 94% (28 Apr 2022 07:50) (94% - 94%)    Constitutional: obese, NAD, in chair, +asterixis  Anicteric   Respiratory: CTABL, breathing comfortably  Cardiovascular: S1 and S2, RRR  Gastrointestinal: +BS, soft, non tender, +distended, no mass, healing R sided abd incision noted  Extremities: warm, well perfused, trace edema  Psychiatric: mild slowing  Neuro: moves all extremities, grossly intact, mild asterixis  Skin: facial eczema    LABS:                                      11.2   5.43  )-----------( 81       ( 28 Apr 2022 08:25 )             32.7     04-28    138  |  106  |  12  ----------------------------<  111<H>  4.2   |  26  |  1.03    Ca    9.2      28 Apr 2022 08:25    TPro  7.0  /  Alb  3.0<L>  /  TBili  1.7<H>  /  DBili  x   /  AST  27  /  ALT  26  /  AlkPhos  85  04-28    PT/INR - ( 27 Apr 2022 08:26 )   PT: 16.3 sec;   INR: 1.40 ratio          GI    HPI:  pt sitting comfortably in bed   mild asterixis  no abd pain except at incision site  no n/v  taking lactulose and +BM    ROS  As above  Otherwise unremarkable, all systems reviewed    PE:  Vital Signs Last 24 Hrs  T(C): 36.7 (28 Apr 2022 07:50), Max: 36.7 (27 Apr 2022 15:04)  T(F): 98.1 (28 Apr 2022 07:50), Max: 98.1 (27 Apr 2022 15:04)  HR: 75 (28 Apr 2022 07:50) (69 - 84)  BP: 124/63 (28 Apr 2022 07:50) (103/78 - 142/75)  BP(mean): --  RR: 18 (28 Apr 2022 07:50) (18 - 18)  SpO2: 94% (28 Apr 2022 07:50) (94% - 94%)    Constitutional: obese, NAD, in chair, +asterixis  Anicteric   Respiratory: CTABL, breathing comfortably  Cardiovascular: S1 and S2, RRR  Gastrointestinal: +BS, soft, non tender, +distended, no mass, healing R sided abd incision noted  Extremities: warm, well perfused, trace edema  Psychiatric: mild slowing  Neuro: moves all extremities, grossly intact, mild asterixis  Skin: facial eczema    LABS:                                      11.2   5.43  )-----------( 81       ( 28 Apr 2022 08:25 )             32.7     04-28    138  |  106  |  12  ----------------------------<  111<H>  4.2   |  26  |  1.03    Ca    9.2      28 Apr 2022 08:25    TPro  7.0  /  Alb  3.0<L>  /  TBili  1.7<H>  /  DBili  x   /  AST  27  /  ALT  26  /  AlkPhos  85  04-28  Ammonia 62    PT/INR - ( 27 Apr 2022 08:26 )   PT: 16.3 sec;   INR: 1.40 ratio          GI    HPI:  pt sleeping in bed comfortably   mild asterixis  no abd pain except at incision site  no n/v    ROS  As above  Otherwise unremarkable, all systems reviewed    PE:  Vital Signs Last 24 Hrs  T(C): 36.7 (28 Apr 2022 07:50), Max: 36.7 (27 Apr 2022 15:04)  T(F): 98.1 (28 Apr 2022 07:50), Max: 98.1 (27 Apr 2022 15:04)  HR: 75 (28 Apr 2022 07:50) (69 - 84)  BP: 124/63 (28 Apr 2022 07:50) (103/78 - 142/75)  BP(mean): --  RR: 18 (28 Apr 2022 07:50) (18 - 18)  SpO2: 94% (28 Apr 2022 07:50) (94% - 94%)    Constitutional: obese, NAD, in chair, +asterixis  Anicteric   Respiratory: CTABL, breathing comfortably  Cardiovascular: S1 and S2, RRR  Gastrointestinal: +BS, soft, non tender, +distended, no mass, healing R sided abd incision noted  Extremities: warm, well perfused, trace edema  Psychiatric: mild slowing  Neuro: moves all extremities, grossly intact, mild asterixis  Skin: facial eczema    LABS:                                    11.2   5.43  )-----------( 81       ( 28 Apr 2022 08:25 )             32.7     04-28    138  |  106  |  12  ----------------------------<  111<H>  4.2   |  26  |  1.03    Ca    9.2      28 Apr 2022 08:25    TPro  7.0  /  Alb  3.0<L>  /  TBili  1.7<H>  /  DBili  x   /  AST  27  /  ALT  26  /  AlkPhos  85  04-28  Ammonia 62    PT/INR - ( 27 Apr 2022 08:26 )   PT: 16.3 sec;   INR: 1.40 ratio

## 2022-04-28 NOTE — DIETITIAN INITIAL EVALUATION ADULT - SIGNS/SYMPTOMS
AEB 4.7% wt loss x 2 weeks, moderate fat wasting, mild edema AEB 4.7% wt loss x 2 weeks, <75% intake x 2 weeks, moderate fat wasting, mild edema

## 2022-04-28 NOTE — PROGRESS NOTE ADULT - SUBJECTIVE AND OBJECTIVE BOX
Mr. Saba is 50 YO M w/pmhx of Cirrhosis, CKD, GERD, RA, Lyme disease, HTN, obesity who presents to ED w/confusion and AMS. Patient recently admitted for liver biopsy, foreign body left in and patient was taken for exploration and successfully removed. Patient examined and is alert and orientated, not as confused as before. Patient's labs are significant for renal insuffiencey, baseline SCr 1.5-2, today 1.9. Patient underwent CTA to r/o PE as patient was hypoxic on arrival. CTA chest negative, received fluid for concern of LISSETH. Patient's BP labile and was started on Levophed. Zosyn was given in ED for SIRS criteria, no clear source of infection. Patient received 2L of fluid in ED, will need another 500cc to meet 30cc/kg bolus based on IBW. Lactate negative. Ammonia 79. Patient states he used to drink almost everyday but does not consider himself a heavy drinker     Subjective- Pt was seen by his bedside, awake and oriented to place and time. Presently, pt is feeling much better and he have no complaints    REVIEW OF SYSTEMS:    CONSTITUTIONAL: No weakness, fevers or chills  EYES/ENT: No visual changes;  No vertigo or throat pain   NECK: No pain or stiffness  RESPIRATORY: No cough, wheezing, hemoptysis; No shortness of breath  CARDIOVASCULAR: No chest pain or palpitations  GASTROINTESTINAL: No abdominal or epigastric pain. No nausea, vomiting, or hematemesis; No diarrhea or constipation. No melena or hematochezia.  GENITOURINARY: No dysuria, frequency or hematuria  NEUROLOGICAL: No numbness or weakness  SKIN: No itching, rashes    VITALS:     GENERAL: NAD, lying in bed comfortably  HEAD:  Atraumatic, Normocephalic  EYES: EOMI, PERRLA, conjunctiva and sclera clear  ENT: Moist mucous membranes  NECK: Supple, No JVD  CHEST/LUNG: Clear to auscultation bilaterally; No rales, rhonchi, wheezing, or rubs. Unlabored respirations  HEART: Regular rate and rhythm; No murmurs, rubs, or gallops  ABDOMEN: Bowel sounds present; Soft, and right lateral upper abdomen post op scar noticed  EXTREMITIES:  2+ Peripheral Pulses, brisk capillary refill. No clubbing, cyanosis, or edema  NERVOUS SYSTEM:  Alert & Oriented X3, speech clear. No deficits   MSK: FROM all 4 extremities, full and equal strength    Vital Signs Last 24 Hrs  T(C): 36.7 (28 Apr 2022 07:50), Max: 36.7 (27 Apr 2022 15:04)  T(F): 98.1 (28 Apr 2022 07:50), Max: 98.1 (27 Apr 2022 15:04)  HR: 75 (28 Apr 2022 07:50) (69 - 84)  BP: 124/63 (28 Apr 2022 07:50) (103/78 - 142/75)  BP(mean): --  RR: 18 (28 Apr 2022 07:50) (18 - 18)  SpO2: 94% (28 Apr 2022 07:50) (94% - 94%)            Labs-                                                  11.2   5.43  )-----------( 81       ( 28 Apr 2022 08:25 )             32.7   04-28    138  |  106  |  12  ----------------------------<  111<H>  4.2   |  26  |  1.03    Ca    9.2      28 Apr 2022 08:25    TPro  7.0  /  Alb  3.0<L>  /  TBili  1.7<H>  /  DBili  x   /  AST  27  /  ALT  26  /  AlkPhos  85  04-28        MEDICATIONS  (STANDING):  dextrose 5%. 1000 milliLiter(s) (50 mL/Hr) IV Continuous <Continuous>  dextrose 5%. 1000 milliLiter(s) (100 mL/Hr) IV Continuous <Continuous>  dextrose 50% Injectable 25 Gram(s) IV Push once  dextrose 50% Injectable 12.5 Gram(s) IV Push once  dextrose 50% Injectable 25 Gram(s) IV Push once  escitalopram 20 milliGRAM(s) Oral daily  glucagon  Injectable 1 milliGRAM(s) IntraMuscular once  heparin   Injectable 5000 Unit(s) SubCutaneous every 8 hours  insulin lispro (ADMELOG) corrective regimen sliding scale   SubCutaneous three times a day before meals  insulin lispro (ADMELOG) corrective regimen sliding scale   SubCutaneous at bedtime  lactulose Syrup 20 Gram(s) Oral four times a day  QUEtiapine 50 milliGRAM(s) Oral at bedtime  rifAXIMin 550 milliGRAM(s) Oral two times a day  traZODone 50 milliGRAM(s) Oral at bedtime    MEDICATIONS  (PRN):  AQUAPHOR (petrolatum Ointment) 1 Application(s) Topical daily PRN rash  clonazePAM  Tablet 0.5 milliGRAM(s) Oral daily PRN anxiety  dextrose Oral Gel 15 Gram(s) Oral once PRN Blood Glucose LESS THAN 70 milliGRAM(s)/deciliter  oxyCODONE    IR 15 milliGRAM(s) Oral every 6 hours PRN Moderate Pain (4 - 6)

## 2022-04-28 NOTE — PHYSICAL THERAPY INITIAL EVALUATION ADULT - GENERAL OBSERVATIONS, REHAB EVAL
resting in bed ,awake,alert,Ox3,pleasant & cooperative ,large body habitus with distended /protruberant abdomen , healing surgical incison R lateral abd/flank s/p recent removal of retained FB after liver biopsy with surrounding bruising (reddish/yellow)

## 2022-04-28 NOTE — PHYSICAL THERAPY INITIAL EVALUATION ADULT - SKIN COLOR/CHARACTERISTICS
scattered bruising over limbs and R abdominal  at surgical site /resolving ,fading/bruised (ecchymotic)

## 2022-04-28 NOTE — DIETITIAN INITIAL EVALUATION ADULT - PERTINENT LABORATORY DATA
04-28    138  |  106  |  12  ----------------------------<  111<H>  4.2   |  26  |  1.03    Ca    9.2      28 Apr 2022 08:25    TPro  7.0  /  Alb  3.0<L>  /  TBili  1.7<H>  /  DBili  x   /  AST  27  /  ALT  26  /  AlkPhos  85  04-28  POCT Blood Glucose.: 105 mg/dL (04-28-22 @ 07:50)  A1C with Estimated Average Glucose Result: 7.2 % (04-26-22 @ 05:25)

## 2022-04-28 NOTE — DIETITIAN INITIAL EVALUATION ADULT - PERTINENT MEDS FT
MEDICATIONS  (STANDING):  dextrose 5%. 1000 milliLiter(s) (100 mL/Hr) IV Continuous <Continuous>  dextrose 5%. 1000 milliLiter(s) (50 mL/Hr) IV Continuous <Continuous>  dextrose 50% Injectable 25 Gram(s) IV Push once  dextrose 50% Injectable 12.5 Gram(s) IV Push once  dextrose 50% Injectable 25 Gram(s) IV Push once  escitalopram 20 milliGRAM(s) Oral daily  glucagon  Injectable 1 milliGRAM(s) IntraMuscular once  heparin   Injectable 5000 Unit(s) SubCutaneous every 8 hours  insulin lispro (ADMELOG) corrective regimen sliding scale   SubCutaneous three times a day before meals  insulin lispro (ADMELOG) corrective regimen sliding scale   SubCutaneous at bedtime  lactulose Syrup 20 Gram(s) Oral four times a day  QUEtiapine 50 milliGRAM(s) Oral at bedtime  rifAXIMin 550 milliGRAM(s) Oral two times a day  traZODone 50 milliGRAM(s) Oral at bedtime    MEDICATIONS  (PRN):  AQUAPHOR (petrolatum Ointment) 1 Application(s) Topical daily PRN rash  clonazePAM  Tablet 0.5 milliGRAM(s) Oral daily PRN anxiety  dextrose Oral Gel 15 Gram(s) Oral once PRN Blood Glucose LESS THAN 70 milliGRAM(s)/deciliter  oxyCODONE    IR 15 milliGRAM(s) Oral every 6 hours PRN Moderate Pain (4 - 6)

## 2022-04-28 NOTE — PROGRESS NOTE ADULT - NS ATTEND AMEND GEN_ALL_CORE FT
Pt seen this evening  sitting up, conversant and alert  dany PO  +BMs  remains with abdominal bloating    Discussed care in detail  He is looking for EtOH rehab program  Cont current meds  No GI objection to d/c home tomorrow on current regimen     emailed list of GI meds to patient at his request that are recommended at home:  -xifaxan 550 mg po bid  -lactulose 30 mL po TID  -lasix 20 mg po daily  -spironolactone 25 mg daily  -low salt diet

## 2022-04-28 NOTE — PHYSICAL THERAPY INITIAL EVALUATION ADULT - LEVEL OF INDEPENDENCE: SIT/SUPINE, REHAB EVAL
prefers to stretch out in the bed (6'4"tall) rather than sitting in bedside chair/independent/supervision

## 2022-04-28 NOTE — PROGRESS NOTE ADULT - ASSESSMENT
51M with alcoholic cirrhosis, decompensated with encephalopathy possibly related to med noncompliance or worsened by narcotics/polypharmacy.  Improved ammonia today.    Rec:  -trend ammonia  -lactulose TID titrate to 2-3 loose stools daily  -cont Xifaxan BID  -trend Cr  -will need screening EGD in future when stable  -EtOH cessation  -f/u AFP  -outpatient follow up with transplant hepatology   51M with alcoholic cirrhosis, decompensated with encephalopathy possibly related to med noncompliance or worsened by narcotics/polypharmacy.    Rec:  -trend ammonia levels  -lactulose TID titrate to 2-3 loose stools daily  -cont Xifaxan BID  -trend Cr  -will need screening EGD in future when stable  -EtOH cessation  -f/u AFP  -outpatient follow up with transplant hepatology

## 2022-04-28 NOTE — DIETITIAN INITIAL EVALUATION ADULT - ADD RECOMMEND
1) Encourage protein-rich foods, maximize food preferences. Give Premier protein shake once daily, 2) MVI w/ minerals daily to ensure 100% RDA met, 3) Consider adding thiamine 100 mg and folic acid daily 2/2 ETOH use/ malnutrition, 4) Monitor bowel movements, on lactulose. Encourage hydration/ fiber-rich foods if diarrhea occurs. RD will continue to monitor PO intake, labs, hydration, and wt prn.

## 2022-04-28 NOTE — PHYSICAL THERAPY INITIAL EVALUATION ADULT - ACTIVE RANGE OF MOTION EXAMINATION, REHAB EVAL
yanet. upper extremity Active ROM was WNL (within normal limits)/bilateral  lower extremity Active ROM was WFL (within functional limits)

## 2022-04-28 NOTE — DIETITIAN INITIAL EVALUATION ADULT - OTHER INFO
52 YO M w/pmhx of Cirrhosis, CKD, GERD, RA, Lyme disease, HTN, obesity who presents to ED w/confusion and AMS. Patient recently admitted for liver biopsy, foreign body left in and patient was taken for exploration and successfully removed. Patient examined and is alert and orientated, not as confused as before. Patient's labs are significant for renal insuffiency. Patient states he used to drink almost everyday but "does not consider himself a heavy drinker." New A1C 7.2% suggestive of possible new onset DM?    Pt reports eating very small meals for the last 2 weeks. Reports experiencing early satiety. But reports eating ~75% of meals currently. On DASH/ TLC diet which is appropriate. States # but wt has been fluctuating 2/2 fluid retention. RD took bed scale wt at 305# - unintentional wt loss of 15#/ 4.7% x ~2 weeks. Currently with 1+ generalized edema skewing wt/ appearance. Appears overwt/ obese with moderate orbital wasting. Meets criteria for PCM. RD gave verbal and written nutrition education regarding protein-rich foods, low sodium, and consistent CHO. See other recommendations below.

## 2022-04-28 NOTE — PROGRESS NOTE ADULT - ATTENDING COMMENTS
Mr. Saba is 50 YO M w/pmhx of Cirrhosis, CKD, GERD, RA, Lyme disease, HTN, obesity who presents to ED w/confusion and AMS.      # Distributive shock   -Recovered  -Pt was treated in ICU   -Received Levophed and weened off with midodrine   - Pts bp is stable   -With hold Lasix and metoprolol   -Close observation

## 2022-04-29 ENCOUNTER — TRANSCRIPTION ENCOUNTER (OUTPATIENT)
Age: 52
End: 2022-04-29

## 2022-04-29 DIAGNOSIS — G47.30 SLEEP APNEA, UNSPECIFIED: ICD-10-CM

## 2022-04-29 DIAGNOSIS — M06.9 RHEUMATOID ARTHRITIS, UNSPECIFIED: ICD-10-CM
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
## 2022-04-29 DIAGNOSIS — Z88.0 ALLERGY STATUS TO PENICILLIN: ICD-10-CM
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
## 2022-04-29 DIAGNOSIS — T81.509A UNSPECIFIED COMPLICATION OF FOREIGN BODY ACCIDENTALLY LEFT IN BODY FOLLOWING UNSPECIFIED PROCEDURE, INITIAL ENCOUNTER: ICD-10-CM

## 2022-04-29 DIAGNOSIS — I10 ESSENTIAL (PRIMARY) HYPERTENSION: ICD-10-CM

## 2022-04-29 DIAGNOSIS — K21.9 GASTRO-ESOPHAGEAL REFLUX DISEASE WITHOUT ESOPHAGITIS: ICD-10-CM

## 2022-04-29 DIAGNOSIS — Z96.651 PRESENCE OF RIGHT ARTIFICIAL KNEE JOINT: ICD-10-CM

## 2022-04-29 LAB
ALBUMIN SERPL ELPH-MCNC: 3 G/DL — LOW (ref 3.3–5)
ALP SERPL-CCNC: 82 U/L — SIGNIFICANT CHANGE UP (ref 40–120)
ALPHA-1-FETOPROTEIN-L3: SIGNIFICANT CHANGE UP % (ref 0–9.9)
ALPHA-1-FETOPROTEIN: 2.6 NG/ML — SIGNIFICANT CHANGE UP (ref 0–8.4)
ALT FLD-CCNC: 24 U/L — SIGNIFICANT CHANGE UP (ref 12–78)
AMMONIA BLD-MCNC: 69 UMOL/L — HIGH (ref 11–32)
ANION GAP SERPL CALC-SCNC: 6 MMOL/L — SIGNIFICANT CHANGE UP (ref 5–17)
AST SERPL-CCNC: 26 U/L — SIGNIFICANT CHANGE UP (ref 15–37)
BILIRUB SERPL-MCNC: 1.3 MG/DL — HIGH (ref 0.2–1.2)
BUN SERPL-MCNC: 14 MG/DL — SIGNIFICANT CHANGE UP (ref 7–23)
CALCIUM SERPL-MCNC: 8.9 MG/DL — SIGNIFICANT CHANGE UP (ref 8.5–10.1)
CHLORIDE SERPL-SCNC: 107 MMOL/L — SIGNIFICANT CHANGE UP (ref 96–108)
CO2 SERPL-SCNC: 26 MMOL/L — SIGNIFICANT CHANGE UP (ref 22–31)
CREAT SERPL-MCNC: 1.19 MG/DL — SIGNIFICANT CHANGE UP (ref 0.5–1.3)
EGFR: 73 ML/MIN/1.73M2 — SIGNIFICANT CHANGE UP
GLUCOSE SERPL-MCNC: 151 MG/DL — HIGH (ref 70–99)
HCT VFR BLD CALC: 31.3 % — LOW (ref 39–50)
HGB BLD-MCNC: 10.9 G/DL — LOW (ref 13–17)
MCHC RBC-ENTMCNC: 33.3 PG — SIGNIFICANT CHANGE UP (ref 27–34)
MCHC RBC-ENTMCNC: 34.8 GM/DL — SIGNIFICANT CHANGE UP (ref 32–36)
MCV RBC AUTO: 95.7 FL — SIGNIFICANT CHANGE UP (ref 80–100)
PLATELET # BLD AUTO: 88 K/UL — LOW (ref 150–400)
POTASSIUM SERPL-MCNC: 4 MMOL/L — SIGNIFICANT CHANGE UP (ref 3.5–5.3)
POTASSIUM SERPL-SCNC: 4 MMOL/L — SIGNIFICANT CHANGE UP (ref 3.5–5.3)
PROT SERPL-MCNC: 6.7 GM/DL — SIGNIFICANT CHANGE UP (ref 6–8.3)
RBC # BLD: 3.27 M/UL — LOW (ref 4.2–5.8)
RBC # FLD: 12 % — SIGNIFICANT CHANGE UP (ref 10.3–14.5)
SODIUM SERPL-SCNC: 139 MMOL/L — SIGNIFICANT CHANGE UP (ref 135–145)
WBC # BLD: 5.67 K/UL — SIGNIFICANT CHANGE UP (ref 3.8–10.5)
WBC # FLD AUTO: 5.67 K/UL — SIGNIFICANT CHANGE UP (ref 3.8–10.5)

## 2022-04-29 PROCEDURE — 99232 SBSQ HOSP IP/OBS MODERATE 35: CPT | Mod: GC

## 2022-04-29 RX ORDER — LACTULOSE 10 G/15ML
45 SOLUTION ORAL
Qty: 2025 | Refills: 0
Start: 2022-04-29 | End: 2022-05-13

## 2022-04-29 RX ORDER — LACTULOSE 10 G/15ML
30 SOLUTION ORAL
Qty: 840 | Refills: 0
Start: 2022-04-29 | End: 2022-05-12

## 2022-04-29 RX ORDER — LACTULOSE 10 G/15ML
30 SOLUTION ORAL
Qty: 1260 | Refills: 0
Start: 2022-04-29 | End: 2022-05-12

## 2022-04-29 RX ADMIN — ESCITALOPRAM OXALATE 20 MILLIGRAM(S): 10 TABLET, FILM COATED ORAL at 09:19

## 2022-04-29 RX ADMIN — QUETIAPINE FUMARATE 50 MILLIGRAM(S): 200 TABLET, FILM COATED ORAL at 21:15

## 2022-04-29 RX ADMIN — HEPARIN SODIUM 5000 UNIT(S): 5000 INJECTION INTRAVENOUS; SUBCUTANEOUS at 21:15

## 2022-04-29 RX ADMIN — OXYCODONE HYDROCHLORIDE 15 MILLIGRAM(S): 5 TABLET ORAL at 00:22

## 2022-04-29 RX ADMIN — HEPARIN SODIUM 5000 UNIT(S): 5000 INJECTION INTRAVENOUS; SUBCUTANEOUS at 13:45

## 2022-04-29 RX ADMIN — Medication 50 MILLIGRAM(S): at 21:15

## 2022-04-29 RX ADMIN — OXYCODONE HYDROCHLORIDE 15 MILLIGRAM(S): 5 TABLET ORAL at 12:23

## 2022-04-29 RX ADMIN — LACTULOSE 30 GRAM(S): 10 SOLUTION ORAL at 21:15

## 2022-04-29 RX ADMIN — LACTULOSE 30 GRAM(S): 10 SOLUTION ORAL at 13:45

## 2022-04-29 RX ADMIN — LACTULOSE 30 GRAM(S): 10 SOLUTION ORAL at 06:18

## 2022-04-29 RX ADMIN — HEPARIN SODIUM 5000 UNIT(S): 5000 INJECTION INTRAVENOUS; SUBCUTANEOUS at 06:18

## 2022-04-29 RX ADMIN — OXYCODONE HYDROCHLORIDE 15 MILLIGRAM(S): 5 TABLET ORAL at 06:21

## 2022-04-29 RX ADMIN — OXYCODONE HYDROCHLORIDE 15 MILLIGRAM(S): 5 TABLET ORAL at 18:39

## 2022-04-29 NOTE — PROGRESS NOTE ADULT - SUBJECTIVE AND OBJECTIVE BOX
GI    HPI:  no complaints  no asterixis  no abd pain   no n/v  dany po    ROS  As above  Otherwise unremarkable, all systems reviewed    PE:  Vital Signs Last 24 Hrs  T(C): 36.7 (29 Apr 2022 07:46), Max: 37.1 (29 Apr 2022 00:10)  T(F): 98.1 (29 Apr 2022 07:46), Max: 98.7 (29 Apr 2022 00:10)  HR: 72 (29 Apr 2022 07:46) (69 - 77)  BP: 139/73 (29 Apr 2022 07:46) (136/70 - 146/74)  BP(mean): --  RR: 19 (29 Apr 2022 07:46) (18 - 19)  SpO2: 94% (29 Apr 2022 07:46) (92% - 94%)    Constitutional: obese, NAD, in chair  Anicteric   Respiratory: CTABL, breathing comfortably  Cardiovascular: S1 and S2, RRR  Gastrointestinal: +BS, soft, non tender, +distended, no mass, healing R sided abd incision noted  Extremities: warm, well perfused, trace edema  Psychiatric: mild slowing  Neuro: moves all extremities, grossly intact, no asterixis  Skin: facial eczema    LABS:                                    pending

## 2022-04-29 NOTE — PROGRESS NOTE ADULT - SUBJECTIVE AND OBJECTIVE BOX
Mr. Saba is 52 YO M w/pmhx of Cirrhosis, CKD, GERD, RA, Lyme disease, HTN, obesity who presents to ED w/confusion and AMS. Patient recently admitted for liver biopsy, foreign body left in and patient was taken for exploration and successfully removed. Patient examined and is alert and orientated, not as confused as before. Patient's labs are significant for renal insuffiencey, baseline SCr 1.5-2, today 1.9. Patient underwent CTA to r/o PE as patient was hypoxic on arrival. CTA chest negative, received fluid for concern of LISSETH. Patient's BP labile and was started on Levophed. Zosyn was given in ED for SIRS criteria, no clear source of infection. Patient received 2L of fluid in ED, will need another 500cc to meet 30cc/kg bolus based on IBW. Lactate negative. Ammonia 79. Patient states he used to drink almost everyday but does not consider himself a heavy drinker     Subjective- Pt was seen by his bedside, awake and oriented to place and time. Presently, pt is feeling much better and he have no complaints    REVIEW OF SYSTEMS:    CONSTITUTIONAL: No weakness, fevers or chills  EYES/ENT: No visual changes;  No vertigo or throat pain   NECK: No pain or stiffness  RESPIRATORY: No cough, wheezing, hemoptysis; No shortness of breath  CARDIOVASCULAR: No chest pain or palpitations  GASTROINTESTINAL: No abdominal or epigastric pain. No nausea, vomiting, or hematemesis; No diarrhea or constipation. No melena or hematochezia.  GENITOURINARY: No dysuria, frequency or hematuria  NEUROLOGICAL: No numbness or weakness  SKIN: No itching, rashes    VITALS:     GENERAL: NAD, lying in bed comfortably  HEAD:  Atraumatic, Normocephalic  EYES: EOMI, PERRLA, conjunctiva and sclera clear  ENT: Moist mucous membranes  NECK: Supple, No JVD  CHEST/LUNG: Clear to auscultation bilaterally; No rales, rhonchi, wheezing, or rubs. Unlabored respirations  HEART: Regular rate and rhythm; No murmurs, rubs, or gallops  ABDOMEN: Bowel sounds present; Soft, and right lateral upper abdomen post op scar noticed  EXTREMITIES:  2+ Peripheral Pulses, brisk capillary refill. No clubbing, cyanosis, or edema  NERVOUS SYSTEM:  Alert & Oriented X3, speech clear. No deficits   MSK: FROM all 4 extremities, full and equal strength    Vital Signs Last 24 Hrs  T(C): 36.7 (29 Apr 2022 07:46), Max: 37.1 (29 Apr 2022 00:10)  T(F): 98.1 (29 Apr 2022 07:46), Max: 98.7 (29 Apr 2022 00:10)  HR: 72 (29 Apr 2022 07:46) (72 - 77)  BP: 139/73 (29 Apr 2022 07:46) (139/73 - 146/74)  BP(mean): --  RR: 19 (29 Apr 2022 07:46) (19 - 19)  SpO2: 94% (29 Apr 2022 07:46) (94% - 94%)          Labs-                                                                  10.9   5.67  )-----------( 88       ( 29 Apr 2022 09:42 )             31.3   04-29    139  |  107  |  14  ----------------------------<  151<H>  4.0   |  26  |  1.19    Ca    8.9      29 Apr 2022 09:42    TPro  6.7  /  Alb  3.0<L>  /  TBili  1.3<H>  /  DBili  x   /  AST  26  /  ALT  24  /  AlkPhos  82  04-29      MEDICATIONS  (STANDING):  dextrose 5%. 1000 milliLiter(s) (50 mL/Hr) IV Continuous <Continuous>  dextrose 5%. 1000 milliLiter(s) (100 mL/Hr) IV Continuous <Continuous>  dextrose 50% Injectable 25 Gram(s) IV Push once  dextrose 50% Injectable 12.5 Gram(s) IV Push once  dextrose 50% Injectable 25 Gram(s) IV Push once  escitalopram 20 milliGRAM(s) Oral daily  glucagon  Injectable 1 milliGRAM(s) IntraMuscular once  heparin   Injectable 5000 Unit(s) SubCutaneous every 8 hours  insulin lispro (ADMELOG) corrective regimen sliding scale   SubCutaneous three times a day before meals  insulin lispro (ADMELOG) corrective regimen sliding scale   SubCutaneous at bedtime  lactulose Syrup 30 Gram(s) Oral three times a day  QUEtiapine 50 milliGRAM(s) Oral at bedtime  rifAXIMin 550 milliGRAM(s) Oral two times a day  traZODone 50 milliGRAM(s) Oral at bedtime    MEDICATIONS  (PRN):  AQUAPHOR (petrolatum Ointment) 1 Application(s) Topical daily PRN rash  clonazePAM  Tablet 0.5 milliGRAM(s) Oral daily PRN anxiety  dextrose Oral Gel 15 Gram(s) Oral once PRN Blood Glucose LESS THAN 70 milliGRAM(s)/deciliter  oxyCODONE    IR 15 milliGRAM(s) Oral every 6 hours PRN Moderate Pain (4 - 6)

## 2022-04-29 NOTE — PROGRESS NOTE ADULT - ATTENDING COMMENTS
52 YO M w/pmhx of Cirrhosis, CKD, GERD, RA, Lyme disease, HTN, obesity who presents to ED w/confusion and AMS.      # Distributive shock   -Recovered  -Pt was treated in ICU   -Received Levophed and weened off with midodrine   - Pts bp is stable   -With hold Lasix and metoprolol   -Close observation    #Hepatic encephalopathy  - Improved  - CT Lactulose 20mg po bid  -Ct rifaximin  -Ammonia level -69  -Gi consult appreciated  -Nutrition consult  appreciated   -follow-up am labs

## 2022-04-29 NOTE — PROGRESS NOTE ADULT - ASSESSMENT
51M with alcoholic cirrhosis, decompensated with encephalopathy possibly related to med noncompliance or worsened by narcotics/polypharmacy.  Much improved clinically.    Rec:  -lactulose TID titrate to 2-3 loose stools daily  -cont Xifaxan BID  -please resume low dose diuretics (lasix 20 mg and spironolactone 25 mg po daily) if Cr normal today   -trend Cr  -will need screening EGD in future when stable as outpt  -EtOH cessation  -f/u AFP  -outpatient follow up with transplant hepatology    no GI objection to d/c today

## 2022-04-29 NOTE — PROGRESS NOTE ADULT - ASSESSMENT
Mr. Saba is 52 YO M w/pmhx of Cirrhosis, CKD, GERD, RA, Lyme disease, HTN, obesity who presents to ED w/confusion and AMS.      # Distributive shock   -Recovered  -Pt was treated in ICU   -Received Levophed and weened off with midodrine   - Pts bp is stable   -With hold Lasix and metoprolol   -Close observation    #Hepatic encephalopathy  - Improved  - CT Lactulose 20mg po bid  -Ct rifaximin  -Ammonia level -69  -Gi consult appreciated  -Nutrition consult  appreciated   -follow-up am labs    #Acute respiratory distress  -Improved  -Probably due to atelectasis     #LESLI  -Improved     #DM-2  -Newly diagnosed  -HBA1C- 7.2  -Sliding scale    #DVT PRO  -Heparin    - Pt is stable and GI cleared him for discharge  -Case was discussed with Dr. Woodard

## 2022-04-29 NOTE — DISCHARGE NOTE NURSING/CASE MANAGEMENT/SOCIAL WORK - PATIENT PORTAL LINK FT
You can access the FollowMyHealth Patient Portal offered by [facility] by registering at the following website: [email or web address]. By joining [facility]’s FollowMyHealth portal, you will also be able to view your health information using other applications (apps) compatible with our system.

## 2022-04-30 LAB
ANION GAP SERPL CALC-SCNC: 5 MMOL/L — SIGNIFICANT CHANGE UP (ref 5–17)
BUN SERPL-MCNC: 10 MG/DL — SIGNIFICANT CHANGE UP (ref 7–23)
CALCIUM SERPL-MCNC: 9 MG/DL — SIGNIFICANT CHANGE UP (ref 8.5–10.1)
CHLORIDE SERPL-SCNC: 108 MMOL/L — SIGNIFICANT CHANGE UP (ref 96–108)
CO2 SERPL-SCNC: 27 MMOL/L — SIGNIFICANT CHANGE UP (ref 22–31)
CREAT SERPL-MCNC: 1.17 MG/DL — SIGNIFICANT CHANGE UP (ref 0.5–1.3)
CULTURE RESULTS: SIGNIFICANT CHANGE UP
CULTURE RESULTS: SIGNIFICANT CHANGE UP
EGFR: 75 ML/MIN/1.73M2 — SIGNIFICANT CHANGE UP
GLUCOSE BLDC GLUCOMTR-MCNC: 143 MG/DL — HIGH (ref 70–99)
GLUCOSE SERPL-MCNC: 132 MG/DL — HIGH (ref 70–99)
POTASSIUM SERPL-MCNC: 3.8 MMOL/L — SIGNIFICANT CHANGE UP (ref 3.5–5.3)
POTASSIUM SERPL-SCNC: 3.8 MMOL/L — SIGNIFICANT CHANGE UP (ref 3.5–5.3)
SODIUM SERPL-SCNC: 140 MMOL/L — SIGNIFICANT CHANGE UP (ref 135–145)
SPECIMEN SOURCE: SIGNIFICANT CHANGE UP
SPECIMEN SOURCE: SIGNIFICANT CHANGE UP

## 2022-04-30 PROCEDURE — 99232 SBSQ HOSP IP/OBS MODERATE 35: CPT | Mod: GC

## 2022-04-30 RX ORDER — SPIRONOLACTONE 25 MG/1
25 TABLET, FILM COATED ORAL DAILY
Refills: 0 | Status: DISCONTINUED | OUTPATIENT
Start: 2022-04-30 | End: 2022-05-03

## 2022-04-30 RX ORDER — FUROSEMIDE 40 MG
20 TABLET ORAL DAILY
Refills: 0 | Status: DISCONTINUED | OUTPATIENT
Start: 2022-04-30 | End: 2022-05-03

## 2022-04-30 RX ADMIN — OXYCODONE HYDROCHLORIDE 15 MILLIGRAM(S): 5 TABLET ORAL at 13:23

## 2022-04-30 RX ADMIN — OXYCODONE HYDROCHLORIDE 15 MILLIGRAM(S): 5 TABLET ORAL at 14:20

## 2022-04-30 RX ADMIN — ESCITALOPRAM OXALATE 20 MILLIGRAM(S): 10 TABLET, FILM COATED ORAL at 12:03

## 2022-04-30 RX ADMIN — LACTULOSE 30 GRAM(S): 10 SOLUTION ORAL at 21:43

## 2022-04-30 RX ADMIN — LACTULOSE 30 GRAM(S): 10 SOLUTION ORAL at 13:22

## 2022-04-30 RX ADMIN — OXYCODONE HYDROCHLORIDE 15 MILLIGRAM(S): 5 TABLET ORAL at 20:33

## 2022-04-30 RX ADMIN — QUETIAPINE FUMARATE 50 MILLIGRAM(S): 200 TABLET, FILM COATED ORAL at 21:44

## 2022-04-30 RX ADMIN — HEPARIN SODIUM 5000 UNIT(S): 5000 INJECTION INTRAVENOUS; SUBCUTANEOUS at 21:44

## 2022-04-30 RX ADMIN — OXYCODONE HYDROCHLORIDE 15 MILLIGRAM(S): 5 TABLET ORAL at 19:30

## 2022-04-30 RX ADMIN — HEPARIN SODIUM 5000 UNIT(S): 5000 INJECTION INTRAVENOUS; SUBCUTANEOUS at 13:35

## 2022-04-30 RX ADMIN — Medication 50 MILLIGRAM(S): at 21:44

## 2022-04-30 RX ADMIN — OXYCODONE HYDROCHLORIDE 15 MILLIGRAM(S): 5 TABLET ORAL at 01:05

## 2022-04-30 RX ADMIN — OXYCODONE HYDROCHLORIDE 15 MILLIGRAM(S): 5 TABLET ORAL at 07:03

## 2022-04-30 NOTE — PROGRESS NOTE ADULT - SUBJECTIVE AND OBJECTIVE BOX
HPI  Mr. Saba is 52 YO M w/pmhx of Cirrhosis, CKD, GERD, RA, Lyme disease, HTN, obesity who presents to ED w/confusion and AMS. Patient recently admitted for liver biopsy, foreign body left in and patient was taken for exploration and successfully removed. Patient examined and is alert and orientated, not as confused as before. Patient's labs are significant for renal insuffiencey, baseline SCr 1.5-2, today 1.9. Patient underwent CTA to r/o PE as patient was hypoxic on arrival. CTA chest negative, received fluid for concern of LISSETH. Patient's BP labile and was started on Levophed. Zosyn was given in ED for SIRS criteria, no clear source of infection. Patient received 2L of fluid in ED, will need another 500cc to meet 30cc/kg bolus based on IBW. Lactate negative. Ammonia 79. Patient states he used to drink almost everyday but does not consider himself a heavy drinker     Subjective  Patient seen and examined at bedside.  No acute events overnight.  Plan for possible d/c on Monday to inpatient rehab discuss with patient and patient's wife.     REVIEW OF SYSTEMS:  CONSTITUTIONAL: No weakness, fevers or chills  EYES/ENT: No visual changes;  No vertigo or throat pain   NECK: No pain or stiffness  RESPIRATORY: No cough, wheezing, hemoptysis; No shortness of breath  CARDIOVASCULAR: No chest pain or palpitations  GASTROINTESTINAL: No abdominal or epigastric pain. No nausea, vomiting, or hematemesis; No diarrhea or constipation. No melena or hematochezia.  GENITOURINARY: No dysuria, frequency or hematuria  NEUROLOGICAL: No numbness or weakness  SKIN: No itching, rashes    Vital Signs Last 24 Hrs  T(C): 36.4 (29 Apr 2022 23:43), Max: 36.4 (29 Apr 2022 23:43)  T(F): 97.5 (29 Apr 2022 23:43), Max: 97.5 (29 Apr 2022 23:43)  HR: 72 (29 Apr 2022 23:43) (72 - 72)  BP: 145/79 (29 Apr 2022 23:43) (145/79 - 145/79)  RR: 18 (29 Apr 2022 23:43) (18 - 18)  SpO2: 97% (29 Apr 2022 23:43) (97% - 97%)    Physical Exam  GENERAL: NAD, lying in bed comfortably  HEAD:  Atraumatic, Normocephalic  EYES: EOMI, PERRLA, conjunctiva and sclera clear  ENT: Moist mucous membranes  NECK: Supple, No JVD  CHEST/LUNG: Clear to auscultation bilaterally; No rales, rhonchi, wheezing, or rubs. Unlabored respirations  HEART: Regular rate and rhythm; No murmurs, rubs, or gallops  ABDOMEN: Bowel sounds present; Soft, and right lateral upper abdomen post op scar noticed. +Mildly distended  EXTREMITIES:  2+ Peripheral Pulses, brisk capillary refill. No clubbing, cyanosis, or edema  NERVOUS SYSTEM:  Alert & Oriented X3, speech clear. No deficits   MSK: FROM all 4 extremities, full and equal strength        Labs-   -No labs for today    MEDICATIONS  (STANDING):  dextrose 5%. 1000 milliLiter(s) (50 mL/Hr) IV Continuous <Continuous>  dextrose 5%. 1000 milliLiter(s) (100 mL/Hr) IV Continuous <Continuous>  dextrose 50% Injectable 25 Gram(s) IV Push once  dextrose 50% Injectable 12.5 Gram(s) IV Push once  dextrose 50% Injectable 25 Gram(s) IV Push once  escitalopram 20 milliGRAM(s) Oral daily  glucagon  Injectable 1 milliGRAM(s) IntraMuscular once  heparin   Injectable 5000 Unit(s) SubCutaneous every 8 hours  insulin lispro (ADMELOG) corrective regimen sliding scale   SubCutaneous three times a day before meals  insulin lispro (ADMELOG) corrective regimen sliding scale   SubCutaneous at bedtime  lactulose Syrup 30 Gram(s) Oral three times a day  QUEtiapine 50 milliGRAM(s) Oral at bedtime  rifAXIMin 550 milliGRAM(s) Oral two times a day  traZODone 50 milliGRAM(s) Oral at bedtime    MEDICATIONS  (PRN):  AQUAPHOR (petrolatum Ointment) 1 Application(s) Topical daily PRN rash  clonazePAM  Tablet 0.5 milliGRAM(s) Oral daily PRN anxiety  dextrose Oral Gel 15 Gram(s) Oral once PRN Blood Glucose LESS THAN 70 milliGRAM(s)/deciliter  oxyCODONE    IR 15 milliGRAM(s) Oral every 6 hours PRN Moderate Pain (4 - 6)               Imaging:  < from: CT Chest No Cont (04.25.22 @ 17:23) >  IMPRESSION:  *  No acute chest pathology. Bibasilar dependent atelectasis.  *  Interval removal of catheter fragment from the right lateral abdominal   wall.  *  Stable mild perihepatic stranding without hematoma or hemoperitoneum.  *  Cirrhosis and portal hypertension with varices.    < end of copied text >    < from: CT Head No Cont (04.25.22 @ 17:21) >  IMPRESSION:    HEAD CT: No acute hemorrhage or midline shift.    < end of copied text >

## 2022-04-30 NOTE — PROGRESS NOTE ADULT - ATTENDING COMMENTS
Please see resident H&P for full details regarding the service. Patient seen and examined at the bedside.     General: Awake and alert, cooperative with exam. No acute distress.   Cardiology: Normal S1, S2. No murmurs. RRR.   Respiratory: Lungs CTABL. No wheezes, rales, or rhonchi.   Gastrointestinal: + BS. Soft. NT. ND. No guarding, rigidity, or rebound tenderness. Caput madusae  Extremities: No peripheral edema bilaterally.  Neurological: A+Ox3. CN 2-12 intact. No FND. Normal speech. No facial droop.   Psychiatric: Normal affect. Normal mood.     Assessment   - Alcoholic cirrhosis   - Hepatic encephalopathy     Plan:   - Ordered BNP to assess renal function -> if Cr normal will restart lasix 20 mg and spironolactone 25 mg as per GI   - f/u outpt for EGD to r/o varices   - f/u outpt with transplant team   - d/c planning -> plan for alcohol rehab on Monday Please see resident H&P for full details regarding the service. Patient seen and examined at the bedside.     General: Awake and alert, cooperative with exam. No acute distress.   Cardiology: Normal S1, S2. No murmurs. RRR.   Respiratory: Lungs CTABL. No wheezes, rales, or rhonchi.   Gastrointestinal: + BS. Soft. NT. ND. No guarding, rigidity, or rebound tenderness. Caput madusae  Extremities: No peripheral edema bilaterally.  Neurological: A+Ox3. CN 2-12 intact. No FND. Normal speech. No facial droop.   Psychiatric: Normal affect. Normal mood.     Assessment   - Alcoholic cirrhosis   - Hepatic encephalopathy     Plan:   - Ordered BNP to assess renal function -> if Cr normal will restart lasix 20 mg and spironolactone 25 mg as per GI   - c/w Xifaxan   - f/u outpt for EGD to r/o varices   - f/u outpt with transplant team   - d/c planning -> plan for alcohol rehab on Monday Please see resident H&P for full details regarding the service. Patient seen and examined at the bedside.     General: Awake and alert, cooperative with exam. No acute distress.   Cardiology: Normal S1, S2. No murmurs. RRR.   Respiratory: Lungs CTABL. No wheezes, rales, or rhonchi.   Gastrointestinal: + BS. Soft. NT. ND. No guarding, rigidity, or rebound tenderness. Caput madusae  Extremities: No peripheral edema bilaterally.  Neurological: A+Ox3. CN 2-12 intact. No FND. Normal speech. No facial droop.   Psychiatric: Normal affect. Normal mood.     Assessment   - Alcoholic cirrhosis   - Hepatic encephalopathy   - Newly diagnosed Type 2 DM     Plan:   - Ordered BNP to assess renal function -> if Cr normal will restart lasix 20 mg and spironolactone 25 mg as per GI   - c/w Xifaxan   - f/u outpt for EGD to r/o varices   - f/u outpt with transplant team   - Pt would like to try diet and exercise modification prior to starting medications, f/u with PCP outpt   - d/c planning -> plan for alcohol rehab on Monday

## 2022-05-01 LAB
ALBUMIN SERPL ELPH-MCNC: 3.2 G/DL — LOW (ref 3.3–5)
ALP SERPL-CCNC: 85 U/L — SIGNIFICANT CHANGE UP (ref 40–120)
ALT FLD-CCNC: 28 U/L — SIGNIFICANT CHANGE UP (ref 12–78)
AMMONIA BLD-MCNC: 41 UMOL/L — HIGH (ref 11–32)
ANION GAP SERPL CALC-SCNC: 6 MMOL/L — SIGNIFICANT CHANGE UP (ref 5–17)
AST SERPL-CCNC: 30 U/L — SIGNIFICANT CHANGE UP (ref 15–37)
BASOPHILS # BLD AUTO: 0.05 K/UL — SIGNIFICANT CHANGE UP (ref 0–0.2)
BASOPHILS NFR BLD AUTO: 1 % — SIGNIFICANT CHANGE UP (ref 0–2)
BILIRUB SERPL-MCNC: 1.4 MG/DL — HIGH (ref 0.2–1.2)
BUN SERPL-MCNC: 11 MG/DL — SIGNIFICANT CHANGE UP (ref 7–23)
CALCIUM SERPL-MCNC: 9.4 MG/DL — SIGNIFICANT CHANGE UP (ref 8.5–10.1)
CHLORIDE SERPL-SCNC: 107 MMOL/L — SIGNIFICANT CHANGE UP (ref 96–108)
CO2 SERPL-SCNC: 28 MMOL/L — SIGNIFICANT CHANGE UP (ref 22–31)
CREAT SERPL-MCNC: 1.21 MG/DL — SIGNIFICANT CHANGE UP (ref 0.5–1.3)
EGFR: 72 ML/MIN/1.73M2 — SIGNIFICANT CHANGE UP
EOSINOPHIL # BLD AUTO: 0.21 K/UL — SIGNIFICANT CHANGE UP (ref 0–0.5)
EOSINOPHIL NFR BLD AUTO: 4 % — SIGNIFICANT CHANGE UP (ref 0–6)
GLUCOSE SERPL-MCNC: 118 MG/DL — HIGH (ref 70–99)
HCT VFR BLD CALC: 34.7 % — LOW (ref 39–50)
HGB BLD-MCNC: 11.9 G/DL — LOW (ref 13–17)
IMM GRANULOCYTES NFR BLD AUTO: 0.4 % — SIGNIFICANT CHANGE UP (ref 0–1.5)
LYMPHOCYTES # BLD AUTO: 1.62 K/UL — SIGNIFICANT CHANGE UP (ref 1–3.3)
LYMPHOCYTES # BLD AUTO: 31.2 % — SIGNIFICANT CHANGE UP (ref 13–44)
MCHC RBC-ENTMCNC: 33.2 PG — SIGNIFICANT CHANGE UP (ref 27–34)
MCHC RBC-ENTMCNC: 34.3 GM/DL — SIGNIFICANT CHANGE UP (ref 32–36)
MCV RBC AUTO: 96.9 FL — SIGNIFICANT CHANGE UP (ref 80–100)
MONOCYTES # BLD AUTO: 0.45 K/UL — SIGNIFICANT CHANGE UP (ref 0–0.9)
MONOCYTES NFR BLD AUTO: 8.7 % — SIGNIFICANT CHANGE UP (ref 2–14)
NEUTROPHILS # BLD AUTO: 2.85 K/UL — SIGNIFICANT CHANGE UP (ref 1.8–7.4)
NEUTROPHILS NFR BLD AUTO: 54.7 % — SIGNIFICANT CHANGE UP (ref 43–77)
PLATELET # BLD AUTO: 82 K/UL — LOW (ref 150–400)
POTASSIUM SERPL-MCNC: 3.9 MMOL/L — SIGNIFICANT CHANGE UP (ref 3.5–5.3)
POTASSIUM SERPL-SCNC: 3.9 MMOL/L — SIGNIFICANT CHANGE UP (ref 3.5–5.3)
PROT SERPL-MCNC: 7.6 GM/DL — SIGNIFICANT CHANGE UP (ref 6–8.3)
RBC # BLD: 3.58 M/UL — LOW (ref 4.2–5.8)
RBC # FLD: 12.2 % — SIGNIFICANT CHANGE UP (ref 10.3–14.5)
SODIUM SERPL-SCNC: 141 MMOL/L — SIGNIFICANT CHANGE UP (ref 135–145)
WBC # BLD: 5.2 K/UL — SIGNIFICANT CHANGE UP (ref 3.8–10.5)
WBC # FLD AUTO: 5.2 K/UL — SIGNIFICANT CHANGE UP (ref 3.8–10.5)

## 2022-05-01 PROCEDURE — 99232 SBSQ HOSP IP/OBS MODERATE 35: CPT | Mod: GC

## 2022-05-01 RX ADMIN — OXYCODONE HYDROCHLORIDE 15 MILLIGRAM(S): 5 TABLET ORAL at 06:09

## 2022-05-01 RX ADMIN — Medication 20 MILLIGRAM(S): at 10:53

## 2022-05-01 RX ADMIN — QUETIAPINE FUMARATE 50 MILLIGRAM(S): 200 TABLET, FILM COATED ORAL at 21:57

## 2022-05-01 RX ADMIN — ESCITALOPRAM OXALATE 20 MILLIGRAM(S): 10 TABLET, FILM COATED ORAL at 10:53

## 2022-05-01 RX ADMIN — HEPARIN SODIUM 5000 UNIT(S): 5000 INJECTION INTRAVENOUS; SUBCUTANEOUS at 14:04

## 2022-05-01 RX ADMIN — OXYCODONE HYDROCHLORIDE 15 MILLIGRAM(S): 5 TABLET ORAL at 06:36

## 2022-05-01 RX ADMIN — LACTULOSE 30 GRAM(S): 10 SOLUTION ORAL at 14:03

## 2022-05-01 RX ADMIN — LACTULOSE 30 GRAM(S): 10 SOLUTION ORAL at 06:12

## 2022-05-01 RX ADMIN — OXYCODONE HYDROCHLORIDE 15 MILLIGRAM(S): 5 TABLET ORAL at 19:43

## 2022-05-01 RX ADMIN — SPIRONOLACTONE 25 MILLIGRAM(S): 25 TABLET, FILM COATED ORAL at 10:54

## 2022-05-01 RX ADMIN — HEPARIN SODIUM 5000 UNIT(S): 5000 INJECTION INTRAVENOUS; SUBCUTANEOUS at 06:12

## 2022-05-01 RX ADMIN — Medication 50 MILLIGRAM(S): at 21:56

## 2022-05-01 RX ADMIN — HEPARIN SODIUM 5000 UNIT(S): 5000 INJECTION INTRAVENOUS; SUBCUTANEOUS at 21:56

## 2022-05-01 RX ADMIN — OXYCODONE HYDROCHLORIDE 15 MILLIGRAM(S): 5 TABLET ORAL at 20:39

## 2022-05-01 RX ADMIN — OXYCODONE HYDROCHLORIDE 15 MILLIGRAM(S): 5 TABLET ORAL at 13:34

## 2022-05-01 RX ADMIN — LACTULOSE 30 GRAM(S): 10 SOLUTION ORAL at 21:56

## 2022-05-01 NOTE — PROGRESS NOTE ADULT - ASSESSMENT
Mr. Saba is 50 YO M w/pmhx of Cirrhosis, CKD, GERD, RA, Lyme disease, HTN, obesity who presents to ED w/confusion and AMS.    # Distributive shock   -Resolved  -Patient was treated in ICU   -Received Levophed and weened off with midodrine   -Pt vital sings stable   -With hold Lasix and metoprolol   -Close observation    #Hepatic encephalopathy  - Improved  -Continue Lactulose 30mg po tid  -Cotninue rifaximin  -Ammonia level - 41  -GI consult appreciated  -Nutrition consult  appreciated   -follow-up am labs    #Acute respiratory distress  -Resolved    #LESLI  -Resolved     #Newly diagnosed T2DM  -HBA1C- 7.2  -Sliding scale  -Glucometer sent to his pharmacy  -Dietary modifications explained to the patient  -Will need to follow up as outpatient for further management and treatment    #DVT ppx  -subq Heparin    #Dispo  - Patient hemodynamically stable and optimized for discharge.  Pending placement to inpatient rehab.  - Will need to follow up as outpatient with GI and hepatologist for further evaluation for liver transplant    *Case was discussed with Dr. Villanueva       Mr. Saba is 52 YO M w/pmhx of Cirrhosis, CKD, GERD, RA, Lyme disease, HTN, obesity who presents to ED w/confusion and AMS.    # Distributive shock   -Resolved  -Patient was treated in ICU   -Received Levophed and weened off with midodrine   -Pt vital sings stable   -With hold Lasix and metoprolol   -Close observation    #Hepatic encephalopathy with underlying alcoholic cirrhosis   - Improved  -Continue Lactulose 30mg po tid  -Cotninue rifaximin  -Ammonia level - 41  -GI consult appreciated  -Nutrition consult  appreciated   -follow-up am labs    #Acute respiratory distress  -Resolved    #LESLI  -Resolved     #Newly diagnosed T2DM  -HBA1C- 7.2  -Sliding scale  -Glucometer sent to his pharmacy  -Dietary modifications explained to the patient  -Will need to follow up as outpatient for further management and treatment    #DVT ppx  -subq Heparin    #Dispo  - Patient hemodynamically stable and optimized for discharge.  Pending placement to inpatient rehab.  - Will need to follow up as outpatient with GI and hepatologist for further evaluation for liver transplant    *Case was discussed with Dr. Walsh

## 2022-05-01 NOTE — PROGRESS NOTE ADULT - ATTENDING COMMENTS
Patient seen and examined  on rounds with  Ren Casanova MD day [facility] resident  .  I was physically present for the key portion of the evaluation and management service provided . I  examined the patient myself and reviewed the plan of care with the patient and  , which I have reviewed and edited .  Medical records reviewed. History, review of systems, physical findings and lab results as documented confirmed , except as modified by me.  Agree with the assessment and plan of  as stated and discussed, except as modified below.     Mr. Saba is 52 YO M w/pmhx of Cirrhosis, CKD, GERD, RA, Lyme disease, HTN, obesity who admitted on 4/25/22  w/confusion and AMS.    Vitals reviewed for last 24 hours  T(C): 36.8 (05-01-22 @ 15:29), Max: 36.8 (05-01-22 @ 15:29)  T(F): 98.3 (05-01-22 @ 15:29), Max: 98.3 (05-01-22 @ 15:29)  HR: 78 (05-01-22 @ 15:29) (74 - 79)  BP: 139/75 (05-01-22 @ 15:29) (139/75 - 142/79)  RR: 19 (05-01-22 @ 15:29) (19 - 20)  SpO2: 96% (05-01-22 @ 15:29) (94% - 96%)  Wt(kg): --  Physical exam :   General: Awake and alert, cooperative with exam. No acute distress.   HEENT : supple neck , no JVD   Cardiology: Normal S1, S2. No murmurs. RRR.   Respiratory: Lungs CTABL. No wheezes, rales, or rhonchi.   Gastrointestinal: + BS. Soft. NT. ND. No guarding, rigidity, or rebound tenderness. Caput madusae  Extremities: No peripheral edema bilaterally.  Neurological: A+Ox3. CN 2-12 intact. No FND. Normal speech. No facial droop.   Psychiatric: Normal affect. Normal mood.     Assessment   1. Alcoholic cirrhosis ,  Hepatic encephalopathy - c/w lactulose , Xifaxin, Lasix and spironolactone restarted , f/u outpt for EGD to r/o varices   and outpt with transplant team   2. Newly diagnosed Type 2 DM - diet, life style modification , A1C 7.2 , pt would like to try diet and exercise modification prior to starting medications, f/u with PCP outpt     - d/c planning -> plan for alcohol rehab on Monday

## 2022-05-01 NOTE — PROGRESS NOTE ADULT - SUBJECTIVE AND OBJECTIVE BOX
HPI  Mr. Saba is 50 YO M w/pmhx of Cirrhosis, CKD, GERD, RA, Lyme disease, HTN, obesity who presents to ED w/confusion and AMS. Patient recently admitted for liver biopsy, foreign body left in and patient was taken for exploration and successfully removed. Patient examined and is alert and orientated, not as confused as before. Patient's labs are significant for renal insuffiencey, baseline SCr 1.5-2, today 1.9. Patient underwent CTA to r/o PE as patient was hypoxic on arrival. CTA chest negative, received fluid for concern of LISSETH. Patient's BP labile and was started on Levophed. Zosyn was given in ED for SIRS criteria, no clear source of infection. Patient received 2L of fluid in ED, will need another 500cc to meet 30cc/kg bolus based on IBW. Lactate negative. Ammonia 79. Patient states he used to drink almost everyday but does not consider himself a heavy drinker     Subjective  Patient seen and examined at bedside.  No acute events overnight.  Plan for possible d/c on Monday to inpatient rehab discuss with patient and patient's wife.     REVIEW OF SYSTEMS:  CONSTITUTIONAL: No weakness, fevers or chills  EYES/ENT: No visual changes;  No vertigo or throat pain   NECK: No pain or stiffness  RESPIRATORY: No cough, wheezing, hemoptysis; No shortness of breath  CARDIOVASCULAR: No chest pain or palpitations  GASTROINTESTINAL: No abdominal or epigastric pain. No nausea, vomiting, or hematemesis; No diarrhea or constipation. No melena or hematochezia.  GENITOURINARY: No dysuria, frequency or hematuria  NEUROLOGICAL: No numbness or weakness  SKIN: No itching, rashes    Vital Signs Last 24 Hrs  T(C): 36.8 (01 May 2022 15:29), Max: 36.9 (30 Apr 2022 15:47)  T(F): 98.3 (01 May 2022 15:29), Max: 98.5 (30 Apr 2022 15:47)  HR: 78 (01 May 2022 15:29) (71 - 79)  BP: 139/75 (01 May 2022 15:29) (137/75 - 142/79)  BP(mean): --  RR: 19 (01 May 2022 15:29) (18 - 20)  SpO2: 96% (01 May 2022 15:29) (94% - 96%)    Physical Exam  GENERAL: NAD, lying in bed comfortably  HEAD:  Atraumatic, Normocephalic  EYES: EOMI, PERRLA, conjunctiva and sclera clear  ENT: Moist mucous membranes  NECK: Supple, No JVD  CHEST/LUNG: Clear to auscultation bilaterally; No rales, rhonchi, wheezing, or rubs. Unlabored respirations  HEART: Regular rate and rhythm; No murmurs, rubs, or gallops  ABDOMEN: Bowel sounds present; Soft, and right lateral upper abdomen post op scar noticed. +Mildly distended  EXTREMITIES:  2+ Peripheral Pulses, brisk capillary refill. No clubbing, cyanosis, or edema  NERVOUS SYSTEM:  Alert & Oriented X3, speech clear. No deficits   MSK: FROM all 4 extremities, full and equal strength        Labs-                         11.9   5.20  )-----------( 82       ( 01 May 2022 07:38 )             34.7   05-01    141  |  107  |  11  ----------------------------<  118<H>  3.9   |  28  |  1.21    Ca    9.4      01 May 2022 07:38    TPro  7.6  /  Alb  3.2<L>  /  TBili  1.4<H>  /  DBili  x   /  AST  30  /  ALT  28  /  AlkPhos  85  05-01      MEDICATIONS  (STANDING):  dextrose 5%. 1000 milliLiter(s) (50 mL/Hr) IV Continuous <Continuous>  dextrose 5%. 1000 milliLiter(s) (100 mL/Hr) IV Continuous <Continuous>  dextrose 50% Injectable 25 Gram(s) IV Push once  dextrose 50% Injectable 12.5 Gram(s) IV Push once  dextrose 50% Injectable 25 Gram(s) IV Push once  escitalopram 20 milliGRAM(s) Oral daily  glucagon  Injectable 1 milliGRAM(s) IntraMuscular once  heparin   Injectable 5000 Unit(s) SubCutaneous every 8 hours  insulin lispro (ADMELOG) corrective regimen sliding scale   SubCutaneous three times a day before meals  insulin lispro (ADMELOG) corrective regimen sliding scale   SubCutaneous at bedtime  lactulose Syrup 30 Gram(s) Oral three times a day  QUEtiapine 50 milliGRAM(s) Oral at bedtime  rifAXIMin 550 milliGRAM(s) Oral two times a day  traZODone 50 milliGRAM(s) Oral at bedtime    MEDICATIONS  (PRN):  AQUAPHOR (petrolatum Ointment) 1 Application(s) Topical daily PRN rash  clonazePAM  Tablet 0.5 milliGRAM(s) Oral daily PRN anxiety  dextrose Oral Gel 15 Gram(s) Oral once PRN Blood Glucose LESS THAN 70 milliGRAM(s)/deciliter  oxyCODONE    IR 15 milliGRAM(s) Oral every 6 hours PRN Moderate Pain (4 - 6)               Imaging:  < from: CT Chest No Cont (04.25.22 @ 17:23) >  IMPRESSION:  *  No acute chest pathology. Bibasilar dependent atelectasis.  *  Interval removal of catheter fragment from the right lateral abdominal   wall.  *  Stable mild perihepatic stranding without hematoma or hemoperitoneum.  *  Cirrhosis and portal hypertension with varices.    < end of copied text >    < from: CT Head No Cont (04.25.22 @ 17:21) >  IMPRESSION:    HEAD CT: No acute hemorrhage or midline shift.    < end of copied text >           cc - abdominal distension    HPI  Mr. Saba is 52 YO M w/pmhx of Cirrhosis, CKD, GERD, RA, Lyme disease, HTN, obesity who presents to ED w/confusion and AMS. Patient recently admitted for liver biopsy, foreign body left in and patient was taken for exploration and successfully removed. Patient examined and is alert and orientated, not as confused as before. Patient's labs are significant for renal insuffiencey, baseline SCr 1.5-2, today 1.9. Patient underwent CTA to r/o PE as patient was hypoxic on arrival. CTA chest negative, received fluid for concern of LISSETH. Patient's BP labile and was started on Levophed. Zosyn was given in ED for SIRS criteria, no clear source of infection. Patient received 2L of fluid in ED, will need another 500cc to meet 30cc/kg bolus based on IBW. Lactate negative. Ammonia 79. Patient states he used to drink almost everyday but does not consider himself a heavy drinker     Subjective  Patient seen and examined at bedside.  No acute events overnight.  Plan for possible d/c on Monday to inpatient rehab discuss with patient and patient's wife.     REVIEW OF SYSTEMS:  CONSTITUTIONAL: No weakness, fevers or chills  EYES/ENT: No visual changes;  No vertigo or throat pain   NECK: No pain or stiffness  RESPIRATORY: No cough, wheezing, hemoptysis; No shortness of breath  CARDIOVASCULAR: No chest pain or palpitations  GASTROINTESTINAL: No abdominal or epigastric pain. No nausea, vomiting, or hematemesis; No diarrhea or constipation. No melena or hematochezia.  GENITOURINARY: No dysuria, frequency or hematuria  NEUROLOGICAL: No numbness or weakness  SKIN: No itching, rashes    Vital Signs Last 24 Hrs  T(C): 36.8 (01 May 2022 15:29), Max: 36.9 (30 Apr 2022 15:47)  T(F): 98.3 (01 May 2022 15:29), Max: 98.5 (30 Apr 2022 15:47)  HR: 78 (01 May 2022 15:29) (71 - 79)  BP: 139/75 (01 May 2022 15:29) (137/75 - 142/79)  BP(mean): --  RR: 19 (01 May 2022 15:29) (18 - 20)  SpO2: 96% (01 May 2022 15:29) (94% - 96%)    Physical Exam  GENERAL: NAD, lying in bed comfortably  HEAD:  Atraumatic, Normocephalic  EYES: EOMI, PERRLA, conjunctiva and sclera clear  ENT: Moist mucous membranes  NECK: Supple, No JVD  CHEST/LUNG: Clear to auscultation bilaterally; No rales, rhonchi, wheezing, or rubs. Unlabored respirations  HEART: Regular rate and rhythm; No murmurs, rubs, or gallops  ABDOMEN: Bowel sounds present; Soft, and right lateral upper abdomen post op scar noticed. +Mildly distended  EXTREMITIES:  2+ Peripheral Pulses, brisk capillary refill. No clubbing, cyanosis, or edema  NERVOUS SYSTEM:  Alert & Oriented X3, speech clear. No deficits   MSK: FROM all 4 extremities, full and equal strength        Labs- all reviewed     Ammonia, Serum: 41 umol/L (05-01-22 @ 07:38)  Ammonia, Serum: 69 umol/L (04-29-22 @ 09:42)  Ammonia, Serum: 62 umol/L (04-28-22 @ 08:25)                          11.9   5.20  )-----------( 82       ( 01 May 2022 07:38 )             34.7   05-01    141  |  107  |  11  ----------------------------<  118<H>  3.9   |  28  |  1.21    Ca    9.4      01 May 2022 07:38    TPro  7.6  /  Alb  3.2<L>  /  TBili  1.4<H>  /  DBili  x   /  AST  30  /  ALT  28  /  AlkPhos  85  05-01      MEDICATIONS  (STANDING):  dextrose 5%. 1000 milliLiter(s) (50 mL/Hr) IV Continuous <Continuous>  dextrose 5%. 1000 milliLiter(s) (100 mL/Hr) IV Continuous <Continuous>  dextrose 50% Injectable 25 Gram(s) IV Push once  dextrose 50% Injectable 12.5 Gram(s) IV Push once  dextrose 50% Injectable 25 Gram(s) IV Push once  escitalopram 20 milliGRAM(s) Oral daily  glucagon  Injectable 1 milliGRAM(s) IntraMuscular once  heparin   Injectable 5000 Unit(s) SubCutaneous every 8 hours  insulin lispro (ADMELOG) corrective regimen sliding scale   SubCutaneous three times a day before meals  insulin lispro (ADMELOG) corrective regimen sliding scale   SubCutaneous at bedtime  lactulose Syrup 30 Gram(s) Oral three times a day  QUEtiapine 50 milliGRAM(s) Oral at bedtime  rifAXIMin 550 milliGRAM(s) Oral two times a day  traZODone 50 milliGRAM(s) Oral at bedtime    MEDICATIONS  (PRN):  AQUAPHOR (petrolatum Ointment) 1 Application(s) Topical daily PRN rash  clonazePAM  Tablet 0.5 milliGRAM(s) Oral daily PRN anxiety  dextrose Oral Gel 15 Gram(s) Oral once PRN Blood Glucose LESS THAN 70 milliGRAM(s)/deciliter  oxyCODONE    IR 15 milliGRAM(s) Oral every 6 hours PRN Moderate Pain (4 - 6)               Imaging:  < from: CT Chest No Cont (04.25.22 @ 17:23) >  IMPRESSION:  *  No acute chest pathology. Bibasilar dependent atelectasis.  *  Interval removal of catheter fragment from the right lateral abdominal   wall.  *  Stable mild perihepatic stranding without hematoma or hemoperitoneum.  *  Cirrhosis and portal hypertension with varices.    < end of copied text >    < from: CT Head No Cont (04.25.22 @ 17:21) >  IMPRESSION:    HEAD CT: No acute hemorrhage or midline shift.    < end of copied text >

## 2022-05-02 LAB
ALBUMIN SERPL ELPH-MCNC: 3.1 G/DL — LOW (ref 3.3–5)
ALP SERPL-CCNC: 82 U/L — SIGNIFICANT CHANGE UP (ref 40–120)
ALT FLD-CCNC: 32 U/L — SIGNIFICANT CHANGE UP (ref 12–78)
AMMONIA BLD-MCNC: 68 UMOL/L — HIGH (ref 11–32)
ANION GAP SERPL CALC-SCNC: 6 MMOL/L — SIGNIFICANT CHANGE UP (ref 5–17)
AST SERPL-CCNC: 31 U/L — SIGNIFICANT CHANGE UP (ref 15–37)
BILIRUB SERPL-MCNC: 1.6 MG/DL — HIGH (ref 0.2–1.2)
BUN SERPL-MCNC: 11 MG/DL — SIGNIFICANT CHANGE UP (ref 7–23)
CALCIUM SERPL-MCNC: 9.1 MG/DL — SIGNIFICANT CHANGE UP (ref 8.5–10.1)
CHLORIDE SERPL-SCNC: 107 MMOL/L — SIGNIFICANT CHANGE UP (ref 96–108)
CO2 SERPL-SCNC: 28 MMOL/L — SIGNIFICANT CHANGE UP (ref 22–31)
CREAT SERPL-MCNC: 1.17 MG/DL — SIGNIFICANT CHANGE UP (ref 0.5–1.3)
EGFR: 75 ML/MIN/1.73M2 — SIGNIFICANT CHANGE UP
GLUCOSE SERPL-MCNC: 113 MG/DL — HIGH (ref 70–99)
HCT VFR BLD CALC: 32.1 % — LOW (ref 39–50)
HGB BLD-MCNC: 10.9 G/DL — LOW (ref 13–17)
MCHC RBC-ENTMCNC: 32.6 PG — SIGNIFICANT CHANGE UP (ref 27–34)
MCHC RBC-ENTMCNC: 34 GM/DL — SIGNIFICANT CHANGE UP (ref 32–36)
MCV RBC AUTO: 96.1 FL — SIGNIFICANT CHANGE UP (ref 80–100)
PLATELET # BLD AUTO: 72 K/UL — LOW (ref 150–400)
POTASSIUM SERPL-MCNC: 4.2 MMOL/L — SIGNIFICANT CHANGE UP (ref 3.5–5.3)
POTASSIUM SERPL-SCNC: 4.2 MMOL/L — SIGNIFICANT CHANGE UP (ref 3.5–5.3)
PROT SERPL-MCNC: 7 GM/DL — SIGNIFICANT CHANGE UP (ref 6–8.3)
RBC # BLD: 3.34 M/UL — LOW (ref 4.2–5.8)
RBC # FLD: 12.4 % — SIGNIFICANT CHANGE UP (ref 10.3–14.5)
SARS-COV-2 RNA SPEC QL NAA+PROBE: SIGNIFICANT CHANGE UP
SODIUM SERPL-SCNC: 141 MMOL/L — SIGNIFICANT CHANGE UP (ref 135–145)
WBC # BLD: 6.58 K/UL — SIGNIFICANT CHANGE UP (ref 3.8–10.5)
WBC # FLD AUTO: 6.58 K/UL — SIGNIFICANT CHANGE UP (ref 3.8–10.5)

## 2022-05-02 PROCEDURE — 99232 SBSQ HOSP IP/OBS MODERATE 35: CPT | Mod: GC

## 2022-05-02 RX ADMIN — LACTULOSE 30 GRAM(S): 10 SOLUTION ORAL at 14:44

## 2022-05-02 RX ADMIN — Medication 50 MILLIGRAM(S): at 21:06

## 2022-05-02 RX ADMIN — OXYCODONE HYDROCHLORIDE 15 MILLIGRAM(S): 5 TABLET ORAL at 04:43

## 2022-05-02 RX ADMIN — HEPARIN SODIUM 5000 UNIT(S): 5000 INJECTION INTRAVENOUS; SUBCUTANEOUS at 04:44

## 2022-05-02 RX ADMIN — LACTULOSE 30 GRAM(S): 10 SOLUTION ORAL at 04:48

## 2022-05-02 RX ADMIN — QUETIAPINE FUMARATE 50 MILLIGRAM(S): 200 TABLET, FILM COATED ORAL at 21:06

## 2022-05-02 RX ADMIN — OXYCODONE HYDROCHLORIDE 15 MILLIGRAM(S): 5 TABLET ORAL at 11:30

## 2022-05-02 RX ADMIN — ESCITALOPRAM OXALATE 20 MILLIGRAM(S): 10 TABLET, FILM COATED ORAL at 10:50

## 2022-05-02 RX ADMIN — LACTULOSE 30 GRAM(S): 10 SOLUTION ORAL at 21:06

## 2022-05-02 RX ADMIN — HEPARIN SODIUM 5000 UNIT(S): 5000 INJECTION INTRAVENOUS; SUBCUTANEOUS at 21:06

## 2022-05-02 RX ADMIN — OXYCODONE HYDROCHLORIDE 15 MILLIGRAM(S): 5 TABLET ORAL at 10:50

## 2022-05-02 RX ADMIN — OXYCODONE HYDROCHLORIDE 15 MILLIGRAM(S): 5 TABLET ORAL at 20:05

## 2022-05-02 RX ADMIN — HEPARIN SODIUM 5000 UNIT(S): 5000 INJECTION INTRAVENOUS; SUBCUTANEOUS at 14:45

## 2022-05-02 RX ADMIN — SPIRONOLACTONE 25 MILLIGRAM(S): 25 TABLET, FILM COATED ORAL at 10:54

## 2022-05-02 RX ADMIN — Medication 20 MILLIGRAM(S): at 10:53

## 2022-05-02 NOTE — PROGRESS NOTE ADULT - SUBJECTIVE AND OBJECTIVE BOX
cc - abdominal distension    HPI  Mr. Saba is 52 YO M w/pmhx of Cirrhosis, CKD, GERD, RA, Lyme disease, HTN, obesity who presents to ED w/confusion and AMS. Patient recently admitted for liver biopsy, foreign body left in and patient was taken for exploration and successfully removed. Patient examined and is alert and orientated, not as confused as before. Patient's labs are significant for renal insuffiencey, baseline SCr 1.5-2, today 1.9. Patient underwent CTA to r/o PE as patient was hypoxic on arrival. CTA chest negative, received fluid for concern of LISSETH. Patient's BP labile and was started on Levophed. Zosyn was given in ED for SIRS criteria, no clear source of infection. Patient received 2L of fluid in ED, will need another 500cc to meet 30cc/kg bolus based on IBW. Lactate negative. Ammonia 79. Patient states he used to drink almost everyday but does not consider himself a heavy drinker     Subjective  Patient seen and examined at bedside.  No acute events overnight.  Plan for possible d/c tomorrow to inpatient rehab     REVIEW OF SYSTEMS:  CONSTITUTIONAL: No weakness, fevers or chills  EYES/ENT: No visual changes;  No vertigo or throat pain   NECK: No pain or stiffness  RESPIRATORY: No cough, wheezing, hemoptysis; No shortness of breath  CARDIOVASCULAR: No chest pain or palpitations  GASTROINTESTINAL: No abdominal or epigastric pain. No nausea, vomiting, or hematemesis; No diarrhea or constipation. No melena or hematochezia.  GENITOURINARY: No dysuria, frequency or hematuria  NEUROLOGICAL: No numbness or weakness  SKIN: No itching, rashes    Vital Signs Last 24 Hrs  T(C): 36.9 (02 May 2022 15:11), Max: 36.9 (02 May 2022 15:11)  T(F): 98.4 (02 May 2022 15:11), Max: 98.4 (02 May 2022 15:11)  HR: 74 (02 May 2022 15:11) (71 - 76)  BP: 131/69 (02 May 2022 15:11) (122/61 - 133/57)  BP(mean): --  RR: 18 (02 May 2022 10:46) (18 - 19)  SpO2: 95% (02 May 2022 10:46) (95% - 96%)    Physical Exam  GENERAL: NAD, lying in bed comfortably  HEAD:  Atraumatic, Normocephalic  EYES: EOMI, PERRLA, conjunctiva and sclera clear  ENT: Moist mucous membranes  NECK: Supple, No JVD  CHEST/LUNG: Clear to auscultation bilaterally; No rales, rhonchi, wheezing, or rubs. Unlabored respirations  HEART: Regular rate and rhythm; No murmurs, rubs, or gallops  ABDOMEN: Bowel sounds present; Soft, and right lateral upper abdomen post op scar noticed. +Mildly distended  EXTREMITIES:  2+ Peripheral Pulses, brisk capillary refill. No clubbing, cyanosis, or edema  NERVOUS SYSTEM:  Alert & Oriented X3, speech clear. No deficits   MSK: FROM all 4 extremities, full and equal strength        Labs- all reviewed                           10.9   6.58  )-----------( 72       ( 02 May 2022 08:34 )             32.1                         10.9   6.58  )-----------( 72       ( 02 May 2022 08:34 )             32.1     MEDICATIONS  (STANDING):  dextrose 5%. 1000 milliLiter(s) (100 mL/Hr) IV Continuous <Continuous>  dextrose 5%. 1000 milliLiter(s) (50 mL/Hr) IV Continuous <Continuous>  dextrose 50% Injectable 25 Gram(s) IV Push once  dextrose 50% Injectable 12.5 Gram(s) IV Push once  dextrose 50% Injectable 25 Gram(s) IV Push once  escitalopram 20 milliGRAM(s) Oral daily  furosemide    Tablet 20 milliGRAM(s) Oral daily  glucagon  Injectable 1 milliGRAM(s) IntraMuscular once  heparin   Injectable 5000 Unit(s) SubCutaneous every 8 hours  insulin lispro (ADMELOG) corrective regimen sliding scale   SubCutaneous three times a day before meals  insulin lispro (ADMELOG) corrective regimen sliding scale   SubCutaneous at bedtime  lactulose Syrup 30 Gram(s) Oral three times a day  QUEtiapine 50 milliGRAM(s) Oral at bedtime  rifAXIMin 550 milliGRAM(s) Oral two times a day  spironolactone 25 milliGRAM(s) Oral daily  traZODone 50 milliGRAM(s) Oral at bedtime    MEDICATIONS  (PRN):  AQUAPHOR (petrolatum Ointment) 1 Application(s) Topical daily PRN rash  clonazePAM  Tablet 0.5 milliGRAM(s) Oral daily PRN anxiety  dextrose Oral Gel 15 Gram(s) Oral once PRN Blood Glucose LESS THAN 70 milliGRAM(s)/deciliter  oxyCODONE    IR 15 milliGRAM(s) Oral every 6 hours PRN Moderate Pain (4 - 6)               Imaging:  < from: CT Chest No Cont (04.25.22 @ 17:23) >  IMPRESSION:  *  No acute chest pathology. Bibasilar dependent atelectasis.  *  Interval removal of catheter fragment from the right lateral abdominal   wall.  *  Stable mild perihepatic stranding without hematoma or hemoperitoneum.  *  Cirrhosis and portal hypertension with varices.    < end of copied text >    < from: CT Head No Cont (04.25.22 @ 17:21) >  IMPRESSION:    HEAD CT: No acute hemorrhage or midline shift.    < end of copied text >

## 2022-05-02 NOTE — CHART NOTE - NSCHARTNOTEFT_GEN_A_CORE
Spoke to pts wife Wen this evening at pt request.  Discussed situation regarding liver disease.  She is disappointed regarding most aspects of his care at the hospital and my communication with her. She is disappointed at social work and the remainder of the team's communication as well.  I answered all of her questions again this evening. These questions were all answered last week when we spoke as well. No clinical changes have occurred. He does not need a liver transplant on any urgent basis. His MELD is 13 and has been stable for discharge for at least 72 hours.  After his inpatient rehab is completed he was directed to arrange care at a transplant center for evaluation.

## 2022-05-02 NOTE — PROGRESS NOTE ADULT - ASSESSMENT
Mr. Saba is 52 YO M w/pmhx of Cirrhosis, CKD, GERD, RA, Lyme disease, HTN, obesity who presents to ED w/confusion and AMS.    # Distributive shock   -Resolved  -Patient was treated in ICU   -Received Levophed and weened off with midodrine   -Pt vital sings stable   -With hold Lasix and metoprolol   -Close observation    #Hepatic encephalopathy with underlying alcoholic cirrhosis   - Improved  -Continue Lactulose 30mg po tid  -Cotninue rifaximin  -Start Spirinolactone 25 mg alternative day   -Ammonia level - 68  -GI consult appreciated  -Nutrition consult  appreciated   -follow-up am labs    #Acute respiratory distress  -Resolved    #LESLI  -Resolved     #Newly diagnosed T2DM  -HBA1C- 7.2  -Sliding scale  -Glucometer sent to his pharmacy  -Dietary modifications explained to the patient  -Will need to follow up as outpatient for further management and treatment    #DVT ppx  -subq Heparin    #Dispo  - Patient hemodynamically stable and optimized for discharge.  Pending placement to inpatient rehab.  - Will need to follow up as outpatient with GI and hepatologist for further evaluation for liver transplant    *Case was discussed with

## 2022-05-02 NOTE — PROGRESS NOTE ADULT - ATTENDING COMMENTS
52 YO M w/pmhx of Cirrhosis, CKD, GERD, RA, Lyme disease, HTN, obesity who presents to ED w/confusion and AMS.    # Distributive shock   -Resolved  -Patient was treated in ICU   -Received Levophed and weened off with midodrine   -Pt vital sings stable   -With hold Lasix and metoprolol   -Close observation

## 2022-05-03 VITALS
OXYGEN SATURATION: 96 % | RESPIRATION RATE: 18 BRPM | TEMPERATURE: 98 F | HEART RATE: 67 BPM | DIASTOLIC BLOOD PRESSURE: 72 MMHG | SYSTOLIC BLOOD PRESSURE: 153 MMHG

## 2022-05-03 LAB
ALBUMIN SERPL ELPH-MCNC: 3.2 G/DL — LOW (ref 3.3–5)
ALP SERPL-CCNC: 86 U/L — SIGNIFICANT CHANGE UP (ref 40–120)
ALT FLD-CCNC: 31 U/L — SIGNIFICANT CHANGE UP (ref 12–78)
AMMONIA BLD-MCNC: 32 UMOL/L — SIGNIFICANT CHANGE UP (ref 11–32)
ANION GAP SERPL CALC-SCNC: 5 MMOL/L — SIGNIFICANT CHANGE UP (ref 5–17)
AST SERPL-CCNC: 34 U/L — SIGNIFICANT CHANGE UP (ref 15–37)
BILIRUB SERPL-MCNC: 1.7 MG/DL — HIGH (ref 0.2–1.2)
BUN SERPL-MCNC: 11 MG/DL — SIGNIFICANT CHANGE UP (ref 7–23)
CALCIUM SERPL-MCNC: 9.6 MG/DL — SIGNIFICANT CHANGE UP (ref 8.5–10.1)
CHLORIDE SERPL-SCNC: 106 MMOL/L — SIGNIFICANT CHANGE UP (ref 96–108)
CO2 SERPL-SCNC: 27 MMOL/L — SIGNIFICANT CHANGE UP (ref 22–31)
CREAT SERPL-MCNC: 1.15 MG/DL — SIGNIFICANT CHANGE UP (ref 0.5–1.3)
EGFR: 76 ML/MIN/1.73M2 — SIGNIFICANT CHANGE UP
GLUCOSE SERPL-MCNC: 147 MG/DL — HIGH (ref 70–99)
HCT VFR BLD CALC: 33.8 % — LOW (ref 39–50)
HGB BLD-MCNC: 11.8 G/DL — LOW (ref 13–17)
MCHC RBC-ENTMCNC: 33.5 PG — SIGNIFICANT CHANGE UP (ref 27–34)
MCHC RBC-ENTMCNC: 34.9 GM/DL — SIGNIFICANT CHANGE UP (ref 32–36)
MCV RBC AUTO: 96 FL — SIGNIFICANT CHANGE UP (ref 80–100)
PLATELET # BLD AUTO: 84 K/UL — LOW (ref 150–400)
POTASSIUM SERPL-MCNC: 4.2 MMOL/L — SIGNIFICANT CHANGE UP (ref 3.5–5.3)
POTASSIUM SERPL-SCNC: 4.2 MMOL/L — SIGNIFICANT CHANGE UP (ref 3.5–5.3)
PROT SERPL-MCNC: 7.3 GM/DL — SIGNIFICANT CHANGE UP (ref 6–8.3)
RBC # BLD: 3.52 M/UL — LOW (ref 4.2–5.8)
RBC # FLD: 12.6 % — SIGNIFICANT CHANGE UP (ref 10.3–14.5)
SODIUM SERPL-SCNC: 138 MMOL/L — SIGNIFICANT CHANGE UP (ref 135–145)
WBC # BLD: 5.69 K/UL — SIGNIFICANT CHANGE UP (ref 3.8–10.5)
WBC # FLD AUTO: 5.69 K/UL — SIGNIFICANT CHANGE UP (ref 3.8–10.5)

## 2022-05-03 PROCEDURE — 99239 HOSP IP/OBS DSCHRG MGMT >30: CPT | Mod: GC

## 2022-05-03 RX ORDER — OXYCODONE HYDROCHLORIDE 5 MG/1
1 TABLET ORAL
Qty: 0 | Refills: 0 | DISCHARGE

## 2022-05-03 RX ORDER — OXYCODONE HYDROCHLORIDE 5 MG/1
1 TABLET ORAL
Qty: 40 | Refills: 0
Start: 2022-05-03 | End: 2022-05-12

## 2022-05-03 RX ORDER — LACTULOSE 10 G/15ML
45 SOLUTION ORAL
Qty: 2025 | Refills: 0
Start: 2022-05-03 | End: 2022-05-17

## 2022-05-03 RX ORDER — ESCITALOPRAM OXALATE 10 MG/1
1 TABLET, FILM COATED ORAL
Qty: 0 | Refills: 0 | DISCHARGE

## 2022-05-03 RX ORDER — ESCITALOPRAM OXALATE 10 MG/1
1 TABLET, FILM COATED ORAL
Qty: 30 | Refills: 0
Start: 2022-05-03 | End: 2022-06-01

## 2022-05-03 RX ORDER — QUETIAPINE FUMARATE 200 MG/1
1 TABLET, FILM COATED ORAL
Qty: 0 | Refills: 0 | DISCHARGE

## 2022-05-03 RX ORDER — CLONAZEPAM 1 MG
1 TABLET ORAL
Qty: 10 | Refills: 0
Start: 2022-05-03 | End: 2022-06-01

## 2022-05-03 RX ORDER — OXYCODONE HYDROCHLORIDE 5 MG/1
15 TABLET ORAL EVERY 6 HOURS
Refills: 0 | Status: DISCONTINUED | OUTPATIENT
Start: 2022-05-03 | End: 2022-05-03

## 2022-05-03 RX ORDER — TRAZODONE HCL 50 MG
1 TABLET ORAL
Qty: 30 | Refills: 0
Start: 2022-05-03 | End: 2022-06-01

## 2022-05-03 RX ORDER — SPIRONOLACTONE 25 MG/1
1 TABLET, FILM COATED ORAL
Qty: 13 | Refills: 0
Start: 2022-05-03 | End: 2022-06-01

## 2022-05-03 RX ORDER — CLONAZEPAM 1 MG
1 TABLET ORAL
Qty: 0 | Refills: 0 | DISCHARGE

## 2022-05-03 RX ORDER — QUETIAPINE FUMARATE 200 MG/1
1 TABLET, FILM COATED ORAL
Qty: 30 | Refills: 0
Start: 2022-05-03 | End: 2022-06-01

## 2022-05-03 RX ADMIN — OXYCODONE HYDROCHLORIDE 15 MILLIGRAM(S): 5 TABLET ORAL at 13:21

## 2022-05-03 RX ADMIN — OXYCODONE HYDROCHLORIDE 15 MILLIGRAM(S): 5 TABLET ORAL at 06:58

## 2022-05-03 RX ADMIN — SPIRONOLACTONE 25 MILLIGRAM(S): 25 TABLET, FILM COATED ORAL at 11:05

## 2022-05-03 RX ADMIN — LACTULOSE 30 GRAM(S): 10 SOLUTION ORAL at 06:45

## 2022-05-03 RX ADMIN — ESCITALOPRAM OXALATE 20 MILLIGRAM(S): 10 TABLET, FILM COATED ORAL at 11:05

## 2022-05-03 RX ADMIN — HEPARIN SODIUM 5000 UNIT(S): 5000 INJECTION INTRAVENOUS; SUBCUTANEOUS at 06:47

## 2022-05-03 RX ADMIN — OXYCODONE HYDROCHLORIDE 15 MILLIGRAM(S): 5 TABLET ORAL at 14:16

## 2022-05-03 RX ADMIN — Medication 20 MILLIGRAM(S): at 11:05

## 2022-05-03 NOTE — PROGRESS NOTE ADULT - PROVIDER SPECIALTY LIST ADULT
Critical Care
Family Medicine
Gastroenterology
Critical Care
Family Medicine
Gastroenterology
Gastroenterology
Family Medicine

## 2022-05-03 NOTE — PROGRESS NOTE ADULT - NUTRITIONAL ASSESSMENT
This patient has been assessed with a concern for Malnutrition and has been determined to have a diagnosis/diagnoses of Severe protein-calorie malnutrition.    This patient is being managed with:   Diet Consistent Carbohydrate/No Snacks-  DASH/TLC {Sodium & Cholesterol Restricted} (DASH)  Entered: May  1 2022  9:53AM    
This patient has been assessed with a concern for Malnutrition and has been determined to have a diagnosis/diagnoses of Severe protein-calorie malnutrition.    This patient is being managed with:   Diet DASH/TLC-  Sodium & Cholesterol Restricted  Entered: Apr 27 2022  1:28PM    
This patient has been assessed with a concern for Malnutrition and has been determined to have a diagnosis/diagnoses of Severe protein-calorie malnutrition.    This patient is being managed with:   Diet Consistent Carbohydrate/No Snacks-  DASH/TLC {Sodium & Cholesterol Restricted} (DASH)  Entered: May  1 2022  9:53AM    
This patient has been assessed with a concern for Malnutrition and has been determined to have a diagnosis/diagnoses of Severe protein-calorie malnutrition.    This patient is being managed with:   Diet DASH/TLC-  Sodium & Cholesterol Restricted  Entered: Apr 27 2022  1:28PM    
This patient has been assessed with a concern for Malnutrition and has been determined to have a diagnosis/diagnoses of Severe protein-calorie malnutrition.    This patient is being managed with:   Diet DASH/TLC-  Sodium & Cholesterol Restricted  Entered: Apr 27 2022  1:28PM    
This patient has been assessed with a concern for Malnutrition and has been determined to have a diagnosis/diagnoses of Severe protein-calorie malnutrition.    This patient is being managed with:   Diet Consistent Carbohydrate/No Snacks-  DASH/TLC {Sodium & Cholesterol Restricted} (DASH)  Entered: May  1 2022  9:53AM

## 2022-05-03 NOTE — PROGRESS NOTE ADULT - ASSESSMENT
Mr. Saba is 52 YO M w/pmhx of Cirrhosis, CKD, GERD, RA, Lyme disease, HTN, obesity who presents to ED w/confusion and AMS.    # Distributive shock   -Resolved  -Patient was treated in ICU   -Received Levophed and weened off with midodrine   -Pt vital sings stable   -With hold Lasix and metoprolol   -Close observation    #Hepatic encephalopathy with underlying alcoholic cirrhosis   - Improved  -Continue Lactulose 30mg po tid  -Cotninue rifaximin  -Start Spirinolactone 25 mg alternative day   -Ammonia level - 32  -GI consult appreciated  -Nutrition consult  appreciated   -follow-up am labs    #Acute respiratory distress  -Resolved    #LESLI  -Resolved     #Newly diagnosed T2DM  -HBA1C- 7.2  -Sliding scale  -Glucometer sent to his pharmacy  -Dietary modifications explained to the patient  -Will need to follow up as outpatient for further management and treatment    #DVT ppx  -subq Heparin    #Dispo  - Patient hemodynamically stable and optimized for discharge.   - Will need to follow up as outpatient with GI and hepatologist for further evaluation for liver transplant    *Case was discussed with

## 2022-05-03 NOTE — PROGRESS NOTE ADULT - REASON FOR ADMISSION
Hepatic encephalopathy
confusion
Hepatic encephalopathy

## 2022-05-03 NOTE — PROGRESS NOTE ADULT - SUBJECTIVE AND OBJECTIVE BOX
cc - abdominal distension    HPI  Mr. Saba is 50 YO M w/pmhx of Cirrhosis, CKD, GERD, RA, Lyme disease, HTN, obesity who presents to ED w/confusion and AMS. Patient recently admitted for liver biopsy, foreign body left in and patient was taken for exploration and successfully removed. Patient examined and is alert and orientated, not as confused as before. Patient's labs are significant for renal insuffiencey, baseline SCr 1.5-2, today 1.9. Patient underwent CTA to r/o PE as patient was hypoxic on arrival. CTA chest negative, received fluid for concern of LISSETH. Patient's BP labile and was started on Levophed. Zosyn was given in ED for SIRS criteria, no clear source of infection. Patient received 2L of fluid in ED, will need another 500cc to meet 30cc/kg bolus based on IBW. Lactate negative. Ammonia 79. Patient states he used to drink almost everyday but does not consider himself a heavy drinker     Subjective  Patient seen and examined at bedside.  No acute events overnight.   d/c today to inpatient rehab     REVIEW OF SYSTEMS:  CONSTITUTIONAL: No weakness, fevers or chills  EYES/ENT: No visual changes;  No vertigo or throat pain   NECK: No pain or stiffness  RESPIRATORY: No cough, wheezing, hemoptysis; No shortness of breath  CARDIOVASCULAR: No chest pain or palpitations  GASTROINTESTINAL: No abdominal or epigastric pain. No nausea, vomiting, or hematemesis; No diarrhea or constipation. No melena or hematochezia.  GENITOURINARY: No dysuria, frequency or hematuria  NEUROLOGICAL: No numbness or weakness  SKIN: No itching, rashes    Vital Signs Last 24 Hrs  T(C): 36.7 (03 May 2022 07:57), Max: 36.9 (03 May 2022 00:02)  T(F): 98.1 (03 May 2022 07:57), Max: 98.4 (03 May 2022 00:02)  HR: 67 (03 May 2022 07:57) (67 - 77)  BP: 153/72 (03 May 2022 07:57) (148/83 - 153/72)  BP(mean): --  RR: 18 (03 May 2022 07:57) (18 - 18)  SpO2: 96% (03 May 2022 07:57) (96% - 96%)    Physical Exam  GENERAL: NAD, lying in bed comfortably  HEAD:  Atraumatic, Normocephalic  EYES: EOMI, PERRLA, conjunctiva and sclera clear  ENT: Moist mucous membranes  NECK: Supple, No JVD  CHEST/LUNG: Clear to auscultation bilaterally; No rales, rhonchi, wheezing, or rubs. Unlabored respirations  HEART: Regular rate and rhythm; No murmurs, rubs, or gallops  ABDOMEN: Bowel sounds present; Soft, and right lateral upper abdomen post op scar noticed. +Mildly distended  EXTREMITIES:  2+ Peripheral Pulses, brisk capillary refill. No clubbing, cyanosis, or edema  NERVOUS SYSTEM:  Alert & Oriented X3, speech clear. No deficits   MSK: FROM all 4 extremities, full and equal strength        Labs- all reviewed                           11.8   5.69  )-----------( 84       ( 03 May 2022 08:31 )             33.8   05-03    138  |  106  |  11  ----------------------------<  147<H>  4.2   |  27  |  1.15    Ca    9.6      03 May 2022 08:31    TPro  7.3  /  Alb  3.2<L>  /  TBili  1.7<H>  /  DBili  x   /  AST  34  /  ALT  31  /  AlkPhos  86  05-03      MEDICATIONS  (STANDING):  dextrose 5%. 1000 milliLiter(s) (50 mL/Hr) IV Continuous <Continuous>  dextrose 5%. 1000 milliLiter(s) (100 mL/Hr) IV Continuous <Continuous>  dextrose 50% Injectable 25 Gram(s) IV Push once  dextrose 50% Injectable 12.5 Gram(s) IV Push once  dextrose 50% Injectable 25 Gram(s) IV Push once  escitalopram 20 milliGRAM(s) Oral daily  furosemide    Tablet 20 milliGRAM(s) Oral daily  glucagon  Injectable 1 milliGRAM(s) IntraMuscular once  heparin   Injectable 5000 Unit(s) SubCutaneous every 8 hours  insulin lispro (ADMELOG) corrective regimen sliding scale   SubCutaneous three times a day before meals  insulin lispro (ADMELOG) corrective regimen sliding scale   SubCutaneous at bedtime  lactulose Syrup 30 Gram(s) Oral three times a day  QUEtiapine 50 milliGRAM(s) Oral at bedtime  rifAXIMin 550 milliGRAM(s) Oral two times a day  spironolactone 25 milliGRAM(s) Oral daily  traZODone 50 milliGRAM(s) Oral at bedtime    MEDICATIONS  (PRN):  AQUAPHOR (petrolatum Ointment) 1 Application(s) Topical daily PRN rash  dextrose Oral Gel 15 Gram(s) Oral once PRN Blood Glucose LESS THAN 70 milliGRAM(s)/deciliter  oxyCODONE    IR 15 milliGRAM(s) Oral every 6 hours PRN Moderate Pain (4 - 6)                 Imaging:  < from: CT Chest No Cont (04.25.22 @ 17:23) >  IMPRESSION:  *  No acute chest pathology. Bibasilar dependent atelectasis.  *  Interval removal of catheter fragment from the right lateral abdominal   wall.  *  Stable mild perihepatic stranding without hematoma or hemoperitoneum.  *  Cirrhosis and portal hypertension with varices.    < end of copied text >    < from: CT Head No Cont (04.25.22 @ 17:21) >  IMPRESSION:    HEAD CT: No acute hemorrhage or midline shift.    < end of copied text >

## 2022-05-04 DIAGNOSIS — M06.9 RHEUMATOID ARTHRITIS, UNSPECIFIED: ICD-10-CM

## 2022-05-04 DIAGNOSIS — I10 ESSENTIAL (PRIMARY) HYPERTENSION: ICD-10-CM

## 2022-05-04 DIAGNOSIS — F41.9 ANXIETY DISORDER, UNSPECIFIED: ICD-10-CM

## 2022-05-04 DIAGNOSIS — M54.9 DORSALGIA, UNSPECIFIED: ICD-10-CM

## 2022-05-04 DIAGNOSIS — F90.9 ATTENTION-DEFICIT HYPERACTIVITY DISORDER, UNSPECIFIED TYPE: ICD-10-CM

## 2022-05-04 DIAGNOSIS — R91.1 SOLITARY PULMONARY NODULE: ICD-10-CM
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
## 2022-05-04 DIAGNOSIS — Z20.822 CONTACT WITH AND (SUSPECTED) EXPOSURE TO COVID-19: ICD-10-CM

## 2022-05-04 DIAGNOSIS — G47.30 SLEEP APNEA, UNSPECIFIED: ICD-10-CM

## 2022-05-04 DIAGNOSIS — Z88.0 ALLERGY STATUS TO PENICILLIN: ICD-10-CM

## 2022-05-04 DIAGNOSIS — K71.10 TOXIC LIVER DISEASE WITH HEPATIC NECROSIS, WITHOUT COMA: ICD-10-CM

## 2022-05-04 DIAGNOSIS — D69.6 THROMBOCYTOPENIA, UNSPECIFIED: ICD-10-CM

## 2022-05-04 DIAGNOSIS — T50.905A ADVERSE EFFECT OF UNSPECIFIED DRUGS, MEDICAMENTS AND BIOLOGICAL SUBSTANCES, INITIAL ENCOUNTER: ICD-10-CM

## 2022-05-04 DIAGNOSIS — Z96.651 PRESENCE OF RIGHT ARTIFICIAL KNEE JOINT: ICD-10-CM

## 2022-05-04 DIAGNOSIS — E66.9 OBESITY, UNSPECIFIED: ICD-10-CM

## 2022-05-04 DIAGNOSIS — K76.0 FATTY (CHANGE OF) LIVER, NOT ELSEWHERE CLASSIFIED: ICD-10-CM

## 2022-05-04 DIAGNOSIS — E87.5 HYPERKALEMIA: ICD-10-CM

## 2022-05-04 DIAGNOSIS — N17.9 ACUTE KIDNEY FAILURE, UNSPECIFIED: ICD-10-CM

## 2022-05-04 DIAGNOSIS — K76.6 PORTAL HYPERTENSION: ICD-10-CM

## 2022-05-04 DIAGNOSIS — G89.29 OTHER CHRONIC PAIN: ICD-10-CM

## 2022-05-04 DIAGNOSIS — K74.60 UNSPECIFIED CIRRHOSIS OF LIVER: ICD-10-CM

## 2022-05-04 DIAGNOSIS — F10.20 ALCOHOL DEPENDENCE, UNCOMPLICATED: ICD-10-CM

## 2022-05-04 DIAGNOSIS — K21.9 GASTRO-ESOPHAGEAL REFLUX DISEASE WITHOUT ESOPHAGITIS: ICD-10-CM

## 2022-05-04 DIAGNOSIS — K80.20 CALCULUS OF GALLBLADDER WITHOUT CHOLECYSTITIS WITHOUT OBSTRUCTION: ICD-10-CM

## 2022-05-04 DIAGNOSIS — D53.9 NUTRITIONAL ANEMIA, UNSPECIFIED: ICD-10-CM

## 2022-05-04 DIAGNOSIS — F32.A DEPRESSION, UNSPECIFIED: ICD-10-CM
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
## 2022-06-22 NOTE — ED ADULT NURSE NOTE - TEMPLATE LIST FOR HEAD TO TOE ASSESSMENT
How Severe Are Your Bumps?: moderate
Have Your Bumps Been Treated?: not been treated
General
Is This A New Presentation, Or A Follow-Up?: Bump

## 2022-08-04 NOTE — ED ADULT TRIAGE NOTE - NS ED TRIAGE AVPU SCALE
Patient notified Alert-The patient is alert, awake and responds to voice. The patient is oriented to time, place, and person. The triage nurse is able to obtain subjective information.

## 2022-08-05 NOTE — ED ADULT TRIAGE NOTE - ESI TRIAGE ACUITY LEVEL, MLM
Assistance OOB with selected safe patient handling equipment/Assistance with ambulation/Communicate Fall Risk and Risk Factors to all staff, patient, and family/Discuss with provider need for PT consult/Monitor gait and stability/Reinforce activity limits and safety measures with patient and family/Visual Cue: Yellow wristband/Bed in lowest position, wheels locked, appropriate side rails in place/Call bell, personal items and telephone in reach/Instruct patient to call for assistance before getting out of bed or chair/Non-slip footwear when patient is out of bed/[place] to call system/Physically safe environment - no spills, clutter or unnecessary equipment/Purposeful Proactive Rounding/Room/bathroom lighting operational, light cord in reach 3

## 2022-08-19 ENCOUNTER — NON-APPOINTMENT (OUTPATIENT)
Age: 52
End: 2022-08-19

## 2022-08-19 DIAGNOSIS — R39.9 UNSPECIFIED SYMPTOMS AND SIGNS INVOLVING THE GENITOURINARY SYSTEM: ICD-10-CM

## 2022-08-24 LAB
APPEARANCE: CLEAR
BACTERIA UR CULT: NORMAL
BACTERIA: NEGATIVE
BILIRUBIN URINE: NEGATIVE
BLOOD URINE: NEGATIVE
CALCIUM OXALATE CRYSTALS: ABNORMAL
COLOR: YELLOW
GLUCOSE QUALITATIVE U: NEGATIVE
HYALINE CASTS: 0 /LPF
KETONES URINE: NEGATIVE
LEUKOCYTE ESTERASE URINE: NEGATIVE
MICROSCOPIC-UA: NORMAL
NITRITE URINE: NEGATIVE
PH URINE: 6.5
PROTEIN URINE: NORMAL
RED BLOOD CELLS URINE: 0 /HPF
SPECIFIC GRAVITY URINE: 1.03
SQUAMOUS EPITHELIAL CELLS: 0 /HPF
UROBILINOGEN URINE: ABNORMAL
WHITE BLOOD CELLS URINE: 0 /HPF

## 2022-08-25 ENCOUNTER — APPOINTMENT (OUTPATIENT)
Dept: UROLOGY | Facility: CLINIC | Age: 52
End: 2022-08-25

## 2022-08-25 VITALS
HEART RATE: 82 BPM | OXYGEN SATURATION: 97 % | BODY MASS INDEX: 32.27 KG/M2 | HEIGHT: 76 IN | DIASTOLIC BLOOD PRESSURE: 72 MMHG | WEIGHT: 265 LBS | SYSTOLIC BLOOD PRESSURE: 117 MMHG

## 2022-08-25 PROCEDURE — 99213 OFFICE O/P EST LOW 20 MIN: CPT

## 2022-08-25 RX ADMIN — TESTOSTERONE CYPIONATE 0 MG/ML: 200 INJECTION INTRAMUSCULAR at 00:00

## 2022-08-25 NOTE — HISTORY OF PRESENT ILLNESS
[FreeTextEntry1] : 48 yo M with Low Testosterone seen 3/2018 with labs (2/2018) Total 281, free 3.4. He reports depression, low libido and fatigue. It is associated with erectile dysfunction. Of note, pt was found to have elevated LFTs. Dr Damon gave clearance for TRT. He also reports mild LUTS with hesitancy. He was started on TRT and found 300 mg IM q2 weeks to control his symptoms. \par \par 1/3/19: Patient presents for follow up. He has been administering TRT injections without complaint. Good control of his hypogonadism symptoms. LUTS not worse or bothersome. No hematuria, no dysuria, no frequency, no urgency. No incontinence. No fevers, no chills, no nausea, no vomiting, no flank pain. \par He had been scheduled for labs prior to this visit, but he has not yet obtained them. \par \par 06/25/2020: Patient presents for follow up. He has not been seen since 1/2019. In that time he stopped administering TRT and developed gynecomastia. He reports fatigue, low libido. No new  symptoms and other medical  issues remain unchanged from above. No hematuria, no dysuria, no frequency, no urgency, no hesitancy, no straining. No incontinence. No fevers, no chills, no nausea, no vomiting, no flank pain. \par \par 02/03/2022: Patient presents for follow up. He reports perineal pain radiating to penis. Mild dysuria. No other acute complaints. He does report ED, and asking for renewal of tadalafil. \par \par 03/18/2022: Patient presents for follow up. He reports lower extremity rash and swelling, he atrributes to abx. He reports continued perineal pain, dribbling, dysuria. Rash on penis. \par \par 08/25/2022: Patient presents for follow up. He reports his symptoms of dysuria and pelvic pressure are not improving despite antibiotics and UA/UCx prior to his starting abx are noted to be negative. Patient reports he would also like to restart his testosterone replacement therapy because his testosterone is 100 and he feels low libido and tiredness. He also states his hepatologist is monitoring him and suggested he can restart his TRT. No hematuria, No fevers, no chills, no nausea, no vomiting, no flank pain.

## 2022-08-25 NOTE — ASSESSMENT
[FreeTextEntry1] : 51 year old M with prostatitis, not improved with abx, UA/UCx neg.Pt with PFM spasm on exam consistent with Pelvic Floor Dysfunction. We discussed that treatment for pelvic floor dysfunction include physical therapy and daily diazepam suppositories. Patient was given referral to physical therapist knowledgeable in PFD and Rx for Valium suppositories was faxed for appropriate pharmacy. Pt declines PT, but will try D10 supp.\par For Low testosterone- will restart TRT as long as patient's hepatologist is monitoring pt for his cirrhosis\par \par - RTO 6 weeks for symptom check or sooner PRN

## 2022-08-25 NOTE — PHYSICAL EXAM
[General Appearance - Well Developed] : well developed [General Appearance - Well Nourished] : well nourished [Normal Appearance] : normal appearance [Well Groomed] : well groomed [General Appearance - In No Acute Distress] : no acute distress [Abdomen Soft] : soft [Abdomen Tenderness] : non-tender [Costovertebral Angle Tenderness] : no ~M costovertebral angle tenderness [Urethral Meatus] : meatus normal [Urinary Bladder Findings] : the bladder was normal on palpation [Scrotum] : the scrotum was normal [Testes Mass (___cm)] : there were no testicular masses [Edema] : no peripheral edema [] : no respiratory distress [Respiration, Rhythm And Depth] : normal respiratory rhythm and effort [Exaggerated Use Of Accessory Muscles For Inspiration] : no accessory muscle use [Oriented To Time, Place, And Person] : oriented to person, place, and time [Affect] : the affect was normal [Mood] : the mood was normal [Not Anxious] : not anxious [Normal Station and Gait] : the gait and station were normal for the patient's age [No Focal Deficits] : no focal deficits [No Palpable Adenopathy] : no palpable adenopathy [Prostate Enlargement] : the prostate was not enlarged [Prostate Tenderness] : the prostate was not tender [No Prostate Nodules] : no prostate nodules [FreeTextEntry1] : tender levators with banding

## 2022-09-16 ENCOUNTER — APPOINTMENT (OUTPATIENT)
Dept: UROLOGY | Facility: CLINIC | Age: 52
End: 2022-09-16

## 2022-09-27 ENCOUNTER — NON-APPOINTMENT (OUTPATIENT)
Age: 52
End: 2022-09-27

## 2022-09-29 ENCOUNTER — OUTPATIENT (OUTPATIENT)
Dept: OUTPATIENT SERVICES | Facility: HOSPITAL | Age: 52
LOS: 1 days | Discharge: HOME | End: 2022-09-29
Payer: COMMERCIAL

## 2022-09-29 ENCOUNTER — RESULT REVIEW (OUTPATIENT)
Age: 52
End: 2022-09-29

## 2022-09-29 VITALS
OXYGEN SATURATION: 94 % | HEIGHT: 76 IN | DIASTOLIC BLOOD PRESSURE: 63 MMHG | TEMPERATURE: 98 F | RESPIRATION RATE: 19 BRPM | SYSTOLIC BLOOD PRESSURE: 110 MMHG | HEART RATE: 71 BPM | WEIGHT: 265 LBS

## 2022-09-29 DIAGNOSIS — Z98.890 OTHER SPECIFIED POSTPROCEDURAL STATES: Chronic | ICD-10-CM

## 2022-09-29 DIAGNOSIS — Z98.1 ARTHRODESIS STATUS: Chronic | ICD-10-CM

## 2022-09-29 DIAGNOSIS — Z96.651 PRESENCE OF RIGHT ARTIFICIAL KNEE JOINT: Chronic | ICD-10-CM

## 2022-09-29 DIAGNOSIS — K70.30 ALCOHOLIC CIRRHOSIS OF LIVER WITHOUT ASCITES: ICD-10-CM

## 2022-09-29 PROCEDURE — 88305 TISSUE EXAM BY PATHOLOGIST: CPT

## 2022-09-29 PROCEDURE — 88312 SPECIAL STAINS GROUP 1: CPT

## 2022-09-29 PROCEDURE — 88305 TISSUE EXAM BY PATHOLOGIST: CPT | Mod: 26

## 2022-09-29 PROCEDURE — 88312 SPECIAL STAINS GROUP 1: CPT | Mod: 26

## 2022-09-29 RX ORDER — BUPRENORPHINE AND NALOXONE 2; .5 MG/1; MG/1
2 TABLET SUBLINGUAL
Qty: 0 | Refills: 0 | DISCHARGE

## 2022-09-29 RX ORDER — TRAZODONE HCL 50 MG
100 TABLET ORAL
Qty: 0 | Refills: 0 | DISCHARGE

## 2022-09-29 RX ORDER — ESCITALOPRAM OXALATE 10 MG/1
1 TABLET, FILM COATED ORAL
Qty: 0 | Refills: 0 | DISCHARGE

## 2022-09-29 RX ORDER — TADALAFIL 10 MG/1
1 TABLET, FILM COATED ORAL
Qty: 0 | Refills: 0 | DISCHARGE

## 2022-09-29 RX ORDER — FUROSEMIDE 40 MG
1 TABLET ORAL
Qty: 0 | Refills: 0 | DISCHARGE

## 2022-09-29 NOTE — ASU PATIENT PROFILE, ADULT - ALCOHOL USE HISTORY SINGLE SELECT
Please let her know  Bactrim is usually first choice in MG with UTI  AVOID macrobid    dpr      Drugs that may unmask or worsen myasthenia gravis  Drugs that may unmask or worsen myasthenia gravis   Anesthetic agents   Neuromuscular blocking agents¶   Antibiotics   Aminoglycosides¶ (eg, gentamicin, neomycin, tobramycin)   Fluoroquinolones (eg, ciprofloxacin, levofloxacin, norfloxacin)   Ketolides? (eg, telithromycin)   Macrolides (eg, azithromycin, clarithromycin, erythromycin)   Cardiovascular drugs   Beta blockers (eg, atenolol, labetalol, metoprolol, propranolol)   Procainamide   Quinidine   Other drugs   Anti-PD-1 monoclonal antibodies (eg, nivolumab and pembrolizumab)   Botulinum toxin   Chloroquine   Hydroxychloroquine   Magnesium   Penicillamine   Quinine    Drugs usually well tolerated in myasthenia gravis but occasionally associated with an exacerbation*   Anesthetic agents   Inhalation anesthetics (eg, isoflurane, halothane)   Local anesthetics? (eg, lidocaine, procaine)   Antibiotics and antiviral agents   Antiretroviral agents (eg, ritonavir)   Clindamycin   Metronidazole   Nitrofurantoin    Tetracyclines (eg, doxycycline, tetracycline)    Vancomycin   Anticonvulsants   Carbamazepine   Ethosuximide   Gabapentin   Phenobarbital   Phenytoin   Antipsychotics and other psychiatric drugs   Butyrophenones (eg, haloperidol)   Lithium   Phenothiazines§ (eg, chlorpromazine, prochlorperazine)   Glucocorticoids¥   Dexamethasone   Methylprednisolone   Prednisone   Ophthalmic drugs   Betaxolol   Echothiophate   Proparacaine   Timolol   Tropicamide   Other drugs   Cisplatinum   Emetine (Ipecac syrup)   Fludarabine   Glatiramer acetate   HMG CoA reductase inhibitors (statins)    Interferon alpha   Interleukin-2   Iodinated contrast agents   Riluzole           yes...

## 2022-09-29 NOTE — ASU PATIENT PROFILE, ADULT - NSICDXPASTMEDICALHX_GEN_ALL_CORE_FT
PAST MEDICAL HISTORY:  Chronic back pain     Cirrhosis     GERD (gastroesophageal reflux disease)     HTN (hypertension)     Lyme disease     RA (rheumatoid arthritis)     Sleep apnea, unspecified type     Umbilical hernia

## 2022-09-29 NOTE — ASU PATIENT PROFILE, ADULT - NSICDXPASTSURGICALHX_GEN_ALL_CORE_FT
PAST SURGICAL HISTORY:  H/O shoulder surgery right    H/O spinal fusion 2014    History of total right knee replacement

## 2022-10-03 DIAGNOSIS — G47.33 OBSTRUCTIVE SLEEP APNEA (ADULT) (PEDIATRIC): ICD-10-CM

## 2022-10-03 DIAGNOSIS — Z96.659 PRESENCE OF UNSPECIFIED ARTIFICIAL KNEE JOINT: ICD-10-CM

## 2022-10-03 DIAGNOSIS — Z88.0 ALLERGY STATUS TO PENICILLIN: ICD-10-CM

## 2022-10-03 DIAGNOSIS — K29.50 UNSPECIFIED CHRONIC GASTRITIS WITHOUT BLEEDING: ICD-10-CM

## 2022-10-03 DIAGNOSIS — K74.60 UNSPECIFIED CIRRHOSIS OF LIVER: ICD-10-CM

## 2022-10-03 DIAGNOSIS — E66.01 MORBID (SEVERE) OBESITY DUE TO EXCESS CALORIES: ICD-10-CM

## 2022-10-03 DIAGNOSIS — I10 ESSENTIAL (PRIMARY) HYPERTENSION: ICD-10-CM

## 2022-10-03 DIAGNOSIS — Z87.891 PERSONAL HISTORY OF NICOTINE DEPENDENCE: ICD-10-CM

## 2022-10-06 ENCOUNTER — NON-APPOINTMENT (OUTPATIENT)
Age: 52
End: 2022-10-06

## 2022-10-10 ENCOUNTER — NON-APPOINTMENT (OUTPATIENT)
Age: 52
End: 2022-10-10

## 2022-11-04 ENCOUNTER — NON-APPOINTMENT (OUTPATIENT)
Age: 52
End: 2022-11-04

## 2022-12-02 PROBLEM — K74.60 UNSPECIFIED CIRRHOSIS OF LIVER: Chronic | Status: ACTIVE | Noted: 2022-09-29

## 2022-12-05 ENCOUNTER — NON-APPOINTMENT (OUTPATIENT)
Age: 52
End: 2022-12-05

## 2022-12-27 ENCOUNTER — LABORATORY RESULT (OUTPATIENT)
Age: 52
End: 2022-12-27

## 2022-12-28 LAB
ALBUMIN SERPL ELPH-MCNC: 4.1 G/DL
ALP BLD-CCNC: 92 U/L
ALT SERPL-CCNC: 20 U/L
AST SERPL-CCNC: 24 U/L
BASOPHILS # BLD AUTO: 0.05 K/UL
BASOPHILS NFR BLD AUTO: 0.8 %
BILIRUB DIRECT SERPL-MCNC: 0.4 MG/DL
BILIRUB INDIRECT SERPL-MCNC: 1 MG/DL
BILIRUB SERPL-MCNC: 1.3 MG/DL
CHOLEST SERPL-MCNC: 142 MG/DL
EOSINOPHIL # BLD AUTO: 0.07 K/UL
EOSINOPHIL NFR BLD AUTO: 1.1 %
HCT VFR BLD CALC: 37.3 %
HDLC SERPL-MCNC: 50 MG/DL
HGB BLD-MCNC: 13 G/DL
IMM GRANULOCYTES NFR BLD AUTO: 0.2 %
LDLC SERPL CALC-MCNC: 73 MG/DL
LYMPHOCYTES # BLD AUTO: 1.37 K/UL
LYMPHOCYTES NFR BLD AUTO: 21.5 %
MAN DIFF?: NORMAL
MCHC RBC-ENTMCNC: 32.3 PG
MCHC RBC-ENTMCNC: 34.9 GM/DL
MCV RBC AUTO: 92.6 FL
MONOCYTES # BLD AUTO: 0.5 K/UL
MONOCYTES NFR BLD AUTO: 7.8 %
NEUTROPHILS # BLD AUTO: 4.37 K/UL
NEUTROPHILS NFR BLD AUTO: 68.6 %
NONHDLC SERPL-MCNC: 92 MG/DL
PLATELET # BLD AUTO: 88 K/UL
PROT SERPL-MCNC: 6.6 G/DL
PSA SERPL-MCNC: 0.17 NG/ML
RBC # BLD: 4.03 M/UL
RBC # FLD: 14.3 %
TRIGL SERPL-MCNC: 95 MG/DL
WBC # FLD AUTO: 6.37 K/UL

## 2022-12-30 LAB
TESTOST FREE SERPL-MCNC: 4 PG/ML
TESTOST SERPL-MCNC: 468 NG/DL

## 2023-01-03 ENCOUNTER — APPOINTMENT (OUTPATIENT)
Dept: UROLOGY | Facility: CLINIC | Age: 53
End: 2023-01-03
Payer: COMMERCIAL

## 2023-01-03 ENCOUNTER — TRANSCRIPTION ENCOUNTER (OUTPATIENT)
Age: 53
End: 2023-01-03

## 2023-01-03 VITALS — HEART RATE: 70 BPM | OXYGEN SATURATION: 98 % | DIASTOLIC BLOOD PRESSURE: 64 MMHG | SYSTOLIC BLOOD PRESSURE: 137 MMHG

## 2023-01-03 PROCEDURE — 99213 OFFICE O/P EST LOW 20 MIN: CPT

## 2023-01-03 NOTE — HISTORY OF PRESENT ILLNESS
[FreeTextEntry1] : 46 yo M with Low Testosterone seen 3/2018 with labs (2/2018) Total 281, free 3.4. He reports depression, low libido and fatigue. It is associated with erectile dysfunction. Of note, pt was found to have elevated LFTs. Dr Damon gave clearance for TRT. He also reports mild LUTS with hesitancy. He was started on TRT and found 300 mg IM q2 weeks to control his symptoms. \par \par 1/3/19: Patient presents for follow up. He has been administering TRT injections without complaint. Good control of his hypogonadism symptoms. LUTS not worse or bothersome. No hematuria, no dysuria, no frequency, no urgency. No incontinence. No fevers, no chills, no nausea, no vomiting, no flank pain. \par He had been scheduled for labs prior to this visit, but he has not yet obtained them. \par \par 06/25/2020: Patient presents for follow up. He has not been seen since 1/2019. In that time he stopped administering TRT and developed gynecomastia. He reports fatigue, low libido. No new  symptoms and other medical  issues remain unchanged from above. No hematuria, no dysuria, no frequency, no urgency, no hesitancy, no straining. No incontinence. No fevers, no chills, no nausea, no vomiting, no flank pain. \par \par 02/03/2022: Patient presents for follow up. He reports perineal pain radiating to penis. Mild dysuria. No other acute complaints. He does report ED, and asking for renewal of tadalafil. \par \par 03/18/2022: Patient presents for follow up. He reports lower extremity rash and swelling, he atrributes to abx. He reports continued perineal pain, dribbling, dysuria. Rash on penis. \par \par 08/25/2022: Patient presents for follow up. He reports his symptoms of dysuria and pelvic pressure are not improving despite antibiotics and UA/UCx prior to his starting abx are noted to be negative. Patient reports he would also like to restart his testosterone replacement therapy because his testosterone is 100 and he feels low libido and tiredness. He also states his hepatologist is monitoring him and suggested he can restart his TRT. No hematuria, No fevers, no chills, no nausea, no vomiting, no flank pain.\par \par 01/03/2023: Patient presents for follow up. He reports improvement of energy and mood with resumption of TRT. Feels better but does think his symptoms could be better controlled. Labs reviewed, Hb 13, psa 0.1, serum T 470. He felt improvement of pelvic pain with D10, but feels overall pain is resolved on its own.

## 2023-01-03 NOTE — DISCUSSION/SUMMARY
[FreeTextEntry1] : Administered testosterone cypionate 300 mg into right buttock.\par \par Lot [id number]\par Exp 01/31/2025\par \par Pt verbalized plan to p/u testo and supplies from pharmacy as ordered and resume self-adminstration.  Reports he is independent with task and declined teaching.

## 2023-01-03 NOTE — ASSESSMENT
[FreeTextEntry1] : 51 yo M with low testosterone, improved with TRT. Will increase dose to 200 mg weekly to see if sx controls improves. Plan for labs in 3 months then q6 months if stable. For PFD, discussed PFPT but pt declines.

## 2023-04-10 ENCOUNTER — APPOINTMENT (OUTPATIENT)
Dept: HEPATOLOGY | Facility: CLINIC | Age: 53
End: 2023-04-10
Payer: COMMERCIAL

## 2023-04-10 VITALS
DIASTOLIC BLOOD PRESSURE: 76 MMHG | WEIGHT: 255 LBS | OXYGEN SATURATION: 98 % | BODY MASS INDEX: 31.05 KG/M2 | HEART RATE: 80 BPM | RESPIRATION RATE: 16 BRPM | HEIGHT: 76 IN | TEMPERATURE: 98.5 F | SYSTOLIC BLOOD PRESSURE: 143 MMHG

## 2023-04-10 PROCEDURE — 99204 OFFICE O/P NEW MOD 45 MIN: CPT

## 2023-04-10 NOTE — PHYSICAL EXAM
[General Appearance - Alert] : alert [Sclera] : the sclera and conjunctiva were normal [Neck Appearance] : the appearance of the neck was normal [Respiration, Rhythm And Depth] : normal respiratory rhythm and effort [Auscultation Breath Sounds / Voice Sounds] : lungs were clear to auscultation bilaterally [Bowel Sounds] : normal bowel sounds [Edema] : there was no peripheral edema [Abdomen Soft] : soft [Abdomen Tenderness] : non-tender [Oriented To Time, Place, And Person] : oriented to person, place, and time [Impaired Insight] : insight and judgment were intact

## 2023-04-10 NOTE — HISTORY OF PRESENT ILLNESS
[de-identified] : 53 y/o m ref by Dr Lionel Damon for cirrhosis. He's had decompensated etoh cirrhosis with HE bouts x 2. Had ascites, s/p paracentesis, s/p liver bx. Has multiple physical compliants; has seen neuro and given gabapentin; has R UE atrophy with b/l finger pains. \par was 300lbs before he got sick 1 year ago; then lost 100lbs. \par No GIB\par liver bx 4/22- \par Final Diagnosis\par \par Liver, needle biopsy:\par - Liver with extensive fibrosis, moderate steatosis and triaditis (see\par comment).\par \par - No definitive evidence of carcinoma.\par Verified by: MARCO GRANT M.D.\par (Electronic Signature)\par Reported on: 04/21/22 14:26 EDT, [address] [place] [address]\par Phone: [phone]   Fax: [phone]\par _________________________________________________________________\par \par \par Comment\par Histologic sections reveal a distorted hepatic architecture with extensive\par pericellular fibrosis,\par bridging fibrosis and nodule formation (Trichrome stain). There is\par moderate macrovesicular steatosis (approximately 40 %).  The portal\par zones show a lymphoplasmacytic inflammatory infiltrate with breaching\par of the limiting plate and bile ductular proliferation with focal bile\par stasis. The hepatocytes show areas of ballooning degeneration and Shonda\par bodies/hyalin. No definitive necrosis is present. No iron (Iron stain) is\par present. The findings are compatible with Grade 2 Steatohepatitis, Stage\par 4 fibrosis/probable or definitive Cirrhosis.\par \par CT april '22\par \par \par \par PMH- HTN, pre DM, above\par PSH- back, knee, shoulder, hernia\par MEDS - lactulose, xifaxan, spironolactone 50,  suboxone; advil, escitalopram\par allergy pcn\par FH - uncle etoh cirrhosis\par SH - born in [place], works as ; lives with family; no tob, Etoh - started dinking age 16, regular at age 24 (3x week 5 drinks/night); 7 years ago was out of work and was dkinking a pint of hard liquor, last drink 4/15/22; s/p RPP

## 2023-04-10 NOTE — ASSESSMENT
[FreeTextEntry1] :  53 y/o m ref by Dr Lionel Damon for cirrhosis, secondary to Etoh and nafld. Imaging and labs reviewed, following issues were d/w pt in detail\par \par etoh/nafld cirrhosis euvolemic, with HE\par Pt educated on the diagnosis and natural history of cirrhosis including the manifestations of ESLD (HE, HCC, ascites, variceal bleeding etc). The discussion of medical management and/or candidacy for liver transplantation was discussed. The importance of Etoh and smoking cessation, adequate nutrition, activity, reporting new symptoms and compliance with follow up appointments advised.  I also emphasized the importance of HCC screening Q6 months and variceal screening as determined. Avoiding NSAIDs and common hepatotoxic medications stressed.  Hepatitis serologies will be checked if not already and patient will need appropriate Hep A/B vaccination if indicated.  The role of other meds like statins, acetaminophen safety was also discussed.  All questions were answered. Patient demonstrated understanding.\par \par I had a detailed discussion with patient and family today regarding hepatic encephalopathy. Signs and symptoms of HE were discussed, as well as recommendations to continue Lactulose titrated to 2-3 soft bowel movements and Rifaxamin 550 mg twice a day. The patient was encouraged not to drive a vehicle or operate heavy machinery. The importance of nutrition including protein intake and multiple snacks a day (including a late night snack), discussed.  Patient demonstrated understanding\par \par The patient was educated on the diagnosis and natural history of portal hypertension manifested as vollume overload. Salt restriction to < 2gm/day. The role of diuretics discussed. continue spironolactone for now\par \par MRI liver\par update labs\par get egd records from Dr. Damon\par will get him a neurlogy referral for physical complaints\par nutrition and activity stressed\par no need for LT eval at this time\par f/u in 6-8 weeks\par \par \par

## 2023-04-12 ENCOUNTER — NON-APPOINTMENT (OUTPATIENT)
Age: 53
End: 2023-04-12

## 2023-04-12 LAB
AFP-TM SERPL-MCNC: 2.2 NG/ML
ALBUMIN SERPL ELPH-MCNC: 4.5 G/DL
ALP BLD-CCNC: 92 U/L
ALT SERPL-CCNC: 27 U/L
ANION GAP SERPL CALC-SCNC: 11 MMOL/L
AST SERPL-CCNC: 28 U/L
BILIRUB SERPL-MCNC: 1.3 MG/DL
BUN SERPL-MCNC: 12 MG/DL
CALCIUM SERPL-MCNC: 9.8 MG/DL
CHLORIDE SERPL-SCNC: 101 MMOL/L
CO2 SERPL-SCNC: 26 MMOL/L
CREAT SERPL-MCNC: 0.99 MG/DL
EGFR: 91 ML/MIN/1.73M2
GLUCOSE SERPL-MCNC: 98 MG/DL
INR PPP: 1.13 RATIO
POTASSIUM SERPL-SCNC: 4.6 MMOL/L
PROT SERPL-MCNC: 7.2 G/DL
PT BLD: 13.1 SEC
SODIUM SERPL-SCNC: 138 MMOL/L

## 2023-04-17 NOTE — PROGRESS NOTE ADULT - SUBJECTIVE AND OBJECTIVE BOX
CC: Confusion  History of Present Illness:   50 y/o male w/ pmhx of HTN, umbilical hernia, GERD, chronic back pain, sleep apnea, RA, lyme disease, chronic liver failure on Xifaxin,  presenting with abd distension bilat leg edema and intermittent AMS for past several days.     S:  4/16: Seen at bedside, prior to eating his lunch, pt sitting up, alert, answering questions appropriately.  Offers no complaints. Updated wife on plan of care.  4/17: More alert, answering questions, awake.  Denies fever, chills, N, V, abd pain, CP, SOB.  4/18: Awake, awaiting biopsy, offers no new complaints.    REVIEW OF SYSTEMS: All other review of systems is negative unless indicated above.       Physical Exam:   Vital Signs Last 24 Hrs  T(C): 36.6 (19 Apr 2022 07:22), Max: 36.8 (18 Apr 2022 15:09)  T(F): 97.9 (19 Apr 2022 07:22), Max: 98.2 (18 Apr 2022 15:09)  HR: 70 (19 Apr 2022 07:22) (70 - 82)  BP: 167/74 (19 Apr 2022 07:22) (129/77 - 167/74)  BP(mean): --  RR: 18 (19 Apr 2022 07:22) (18 - 18)  SpO2: 94% (19 Apr 2022 07:22) (94% - 96%)      PHYSICAL EXAM:    Constitutional: NAD, awake and alert, well-developed, obese  HEENT: PERR, EOMI, Normal Hearing, MMM  Neck: Soft and supple  Respiratory: Breath sounds are clear bilaterally, No wheezing, rales or rhonchi  Cardiovascular: S1 and S2, regular rate and rhythm, no Murmurs, gallops or rubs  Gastrointestinal: Bowel Sounds present, soft, nontender, nondistended, no guarding, no rebound  Extremities: No peripheral edema  Neurological: A/O x 3, no focal deficits in my limited exam    MEDICATIONS  (STANDING):  escitalopram 20 milliGRAM(s) Oral daily  fluticasone propionate 50 MICROgram(s)/spray Nasal Spray 1 Spray(s) Both Nostrils two times a day  furosemide    Tablet 40 milliGRAM(s) Oral at bedtime  lactulose Syrup 20 Gram(s) Oral daily  metoprolol succinate ER 50 milliGRAM(s) Oral daily  rifAXIMin 550 milliGRAM(s) Oral two times a day  senna 2 Tablet(s) Oral at bedtime  traZODone 50 milliGRAM(s) Oral at bedtime    MEDICATIONS  (PRN):  aluminum hydroxide/magnesium hydroxide/simethicone Suspension 30 milliLiter(s) Oral every 4 hours PRN Dyspepsia  artificial  tears Solution 1 Drop(s) Both EYES every 8 hours PRN Dry Eyes  ondansetron Injectable 4 milliGRAM(s) IV Push every 6 hours PRN Nausea  oxyCODONE    IR 15 milliGRAM(s) Oral every 6 hours PRN Severe Pain (7 - 10)                                13.3   8.62  )-----------( 77       ( 19 Apr 2022 07:33 )             39.6     04-19    133<L>  |  100  |  27<H>  ----------------------------<  158<H>  4.0   |  26  |  1.54<H>    Ca    9.4      19 Apr 2022 07:33    TPro  8.2  /  Alb  3.6  /  TBili  2.1<H>  /  DBili  x   /  AST  47<H>  /  ALT  53  /  AlkPhos  93  04-19    CAPILLARY BLOOD GLUCOSE        LIVER FUNCTIONS - ( 19 Apr 2022 07:33 )  Alb: 3.6 g/dL / Pro: 8.2 gm/dL / ALK PHOS: 93 U/L / ALT: 53 U/L / AST: 47 U/L / GGT: x           PT/INR - ( 19 Apr 2022 07:33 )   PT: 16.3 sec;   INR: 1.40 ratio         PTT - ( 19 Apr 2022 07:33 )  PTT:37.9 sec          Assessment and Plan:  50 y/o male w/ pmhx of HTN, umbilical hernia, GERD, chronic back pain, sleep apnea, RA, lyme disease, chronic liver failure on Xifaxin,  presenting with abd distension bilat leg edema and intermittent AMS for past several days.         # Acute toxic-Hepatic encephalopathy due to chronic liver disease / hyperammonemia / polypharmacy:   - Mentation improving, at baseline   - ammonia 63 --> 35 --> 28  - CT hepatic steatosis / nodularity   - MR --> Gallstones  - cont w/ xifaxin   - lactulose, reduce dose  - aldactone stopped due to hyperkalemia  - stopped seroquel, ambien  - lowered trazadone dose from 150mg to  50mg   - GI following, IR guided liver biopsy today  - BCx neg x 2      # chronic etoh abuse without active withdrawal:  - no signs of withdrawal at this time  - stop CIWA protocol  - multivitamin     # LESLI / hyperkalemia:  RESOLVED  - Scr trending down 3.08 --> 2.03 --> 1.84 --> 1.61  - aldactone on hold due to hyperkalemia  - s/p IVFs and dose of lokelma   - monitor closely on lasix  - nephro following    # macrocytic anemia / thrombocytopenia / hyperbilirubinemia / coagulopathy: Stable  - alcohol cessation     # HTN / GERD / chronic back pain / Depression / anxiety / ADHD / sleep apnea / RA:    - cont w/ metoprolol w/ hold parameters   - oxycodone 15mg (has been on this dose chronically)   - decreased trazadone 150mg to 50mg qhs  - Stopped seroquel, taper trazadone  - c/w lexapro   - unable to use CPAP due to covid precautions   - d/c ambien    # Pulmonary nodule  - out patient follow up    dispo:  -d/c tomorrow.  watch for bleeding post IR biopsy.    Weekly MFM review. The following adjustments were made to medications:    Increase Metformin to 1000mg BID.     Pt to send in blood sugar logs again next Monday, 4/24 with a follow up appointment scheduled 4/28.

## 2023-05-01 RX ORDER — CLOTRIMAZOLE AND BETAMETHASONE DIPROPIONATE 10; .5 MG/G; MG/G
1-0.05 CREAM TOPICAL 3 TIMES DAILY
Qty: 2 | Refills: 0 | Status: COMPLETED | COMMUNITY
Start: 2022-03-18 | End: 2023-05-01

## 2023-05-01 RX ORDER — SULFAMETHOXAZOLE AND TRIMETHOPRIM 800; 160 MG/1; MG/1
800-160 TABLET ORAL TWICE DAILY
Qty: 28 | Refills: 0 | Status: COMPLETED | COMMUNITY
Start: 2022-02-22 | End: 2023-05-01

## 2023-05-01 RX ORDER — CEFPODOXIME PROXETIL 200 MG/1
200 TABLET, FILM COATED ORAL TWICE DAILY
Qty: 14 | Refills: 0 | Status: COMPLETED | COMMUNITY
Start: 2022-03-18 | End: 2023-05-01

## 2023-05-01 RX ORDER — LEVOFLOXACIN 500 MG/1
500 TABLET, FILM COATED ORAL DAILY
Qty: 14 | Refills: 0 | Status: COMPLETED | COMMUNITY
Start: 2022-02-03 | End: 2023-05-01

## 2023-05-01 RX ORDER — SYRINGE,SAFETY WITH NEEDLE,3ML 22GX1 1/2"
22G X 1-1/2" SYRINGE, EMPTY DISPOSABLE MISCELLANEOUS
Qty: 6 | Refills: 3 | Status: COMPLETED | COMMUNITY
Start: 2020-09-30 | End: 2023-05-01

## 2023-05-02 LAB — PSA SERPL-MCNC: 0.54 NG/ML

## 2023-05-03 LAB
BASOPHILS # BLD AUTO: 0.05 K/UL
BASOPHILS NFR BLD AUTO: 0.8 %
EOSINOPHIL # BLD AUTO: 0.11 K/UL
EOSINOPHIL NFR BLD AUTO: 1.9 %
HCT VFR BLD CALC: 40.3 %
HGB BLD-MCNC: 13.6 G/DL
IMM GRANULOCYTES NFR BLD AUTO: 0.2 %
LYMPHOCYTES # BLD AUTO: 1.45 K/UL
LYMPHOCYTES NFR BLD AUTO: 24.5 %
MAN DIFF?: NORMAL
MCHC RBC-ENTMCNC: 31.3 PG
MCHC RBC-ENTMCNC: 33.7 GM/DL
MCV RBC AUTO: 92.9 FL
MONOCYTES # BLD AUTO: 0.47 K/UL
MONOCYTES NFR BLD AUTO: 7.9 %
NEUTROPHILS # BLD AUTO: 3.83 K/UL
NEUTROPHILS NFR BLD AUTO: 64.7 %
PLATELET # BLD AUTO: 91 K/UL
RBC # BLD: 4.34 M/UL
RBC # FLD: 14.5 %
WBC # FLD AUTO: 5.92 K/UL

## 2023-05-04 ENCOUNTER — APPOINTMENT (OUTPATIENT)
Dept: UROLOGY | Facility: CLINIC | Age: 53
End: 2023-05-04
Payer: COMMERCIAL

## 2023-05-04 VITALS
RESPIRATION RATE: 16 BRPM | HEART RATE: 75 BPM | TEMPERATURE: 98 F | BODY MASS INDEX: 31.05 KG/M2 | HEIGHT: 76 IN | OXYGEN SATURATION: 98 % | DIASTOLIC BLOOD PRESSURE: 74 MMHG | SYSTOLIC BLOOD PRESSURE: 128 MMHG | WEIGHT: 255 LBS

## 2023-05-04 LAB
TESTOST FREE SERPL-MCNC: 3.2 PG/ML
TESTOST SERPL-MCNC: 402 NG/DL

## 2023-05-04 PROCEDURE — 99213 OFFICE O/P EST LOW 20 MIN: CPT

## 2023-05-04 NOTE — DISCUSSION/SUMMARY
[FreeTextEntry1] : Administered testosterone cypionate 300 mg into right buttock.\par \par Lot [id number]\par Exp 01/31/2025\par \par Pt verbalized plan to p/u testo and supplies from pharmacy as ordered and resume self-adminstration.  Reports he is independent with task and declined teaching.

## 2023-05-04 NOTE — ASSESSMENT
[FreeTextEntry1] : 53 yo M with low testosterone, improved with increased dose of 200 mg weekly. Plan for labs in 3 months then q6 months if stable. For PFD, discussed PFPT but pt declines and prefers to use D10 PRN flare ups.

## 2023-05-04 NOTE — HISTORY OF PRESENT ILLNESS
[FreeTextEntry1] : 46 yo M with Low Testosterone seen 3/2018 with labs (2/2018) Total 281, free 3.4. He reports depression, low libido and fatigue. It is associated with erectile dysfunction. Of note, pt was found to have elevated LFTs. Dr Damon gave clearance for TRT. He also reports mild LUTS with hesitancy. He was started on TRT and found 300 mg IM q2 weeks to control his symptoms. \par \par 1/3/19: Patient presents for follow up. He has been administering TRT injections without complaint. Good control of his hypogonadism symptoms. LUTS not worse or bothersome. No hematuria, no dysuria, no frequency, no urgency. No incontinence. No fevers, no chills, no nausea, no vomiting, no flank pain. \par He had been scheduled for labs prior to this visit, but he has not yet obtained them. \par \par 06/25/2020: Patient presents for follow up. He has not been seen since 1/2019. In that time he stopped administering TRT and developed gynecomastia. He reports fatigue, low libido. No new  symptoms and other medical  issues remain unchanged from above. No hematuria, no dysuria, no frequency, no urgency, no hesitancy, no straining. No incontinence. No fevers, no chills, no nausea, no vomiting, no flank pain. \par \par 02/03/2022: Patient presents for follow up. He reports perineal pain radiating to penis. Mild dysuria. No other acute complaints. He does report ED, and asking for renewal of tadalafil. \par \par 03/18/2022: Patient presents for follow up. He reports lower extremity rash and swelling, he atrributes to abx. He reports continued perineal pain, dribbling, dysuria. Rash on penis. \par \par 08/25/2022: Patient presents for follow up. He reports his symptoms of dysuria and pelvic pressure are not improving despite antibiotics and UA/UCx prior to his starting abx are noted to be negative. Patient reports he would also like to restart his testosterone replacement therapy because his testosterone is 100 and he feels low libido and tiredness. He also states his hepatologist is monitoring him and suggested he can restart his TRT. No hematuria, No fevers, no chills, no nausea, no vomiting, no flank pain.\par \par 01/03/2023: Patient presents for follow up. He reports improvement of energy and mood with resumption of TRT. Feels better but does think his symptoms could be better controlled. Labs reviewed, Hb 13, psa 0.1, serum T 470. He felt improvement of pelvic pain with D10, but feels overall pain is resolved on its own. \par \par 05/04/2023: Patient presents for follow up. He reports improvement in energy and mood after increasing his TRT to 200 mg Q weekly. Labs reviewed Hb 13.6, PSA 0.54 and serum T 400. Pt is also requesting refill for D10 as he uses it for occasional PFD flare ups. Denies any new urinary symptoms today. \par

## 2023-05-24 ENCOUNTER — OUTPATIENT (OUTPATIENT)
Dept: OUTPATIENT SERVICES | Facility: HOSPITAL | Age: 53
LOS: 1 days | End: 2023-05-24
Payer: COMMERCIAL

## 2023-05-24 ENCOUNTER — APPOINTMENT (OUTPATIENT)
Dept: MRI IMAGING | Facility: CLINIC | Age: 53
End: 2023-05-24

## 2023-05-24 ENCOUNTER — RESULT REVIEW (OUTPATIENT)
Age: 53
End: 2023-05-24

## 2023-05-24 DIAGNOSIS — Z98.1 ARTHRODESIS STATUS: Chronic | ICD-10-CM

## 2023-05-24 DIAGNOSIS — Z98.890 OTHER SPECIFIED POSTPROCEDURAL STATES: Chronic | ICD-10-CM

## 2023-05-24 DIAGNOSIS — K76.82 HEPATIC ENCEPHALOPATHY: ICD-10-CM

## 2023-05-24 DIAGNOSIS — Z96.651 PRESENCE OF RIGHT ARTIFICIAL KNEE JOINT: Chronic | ICD-10-CM

## 2023-05-24 PROCEDURE — 74183 MRI ABD W/O CNTR FLWD CNTR: CPT | Mod: 26

## 2023-05-31 ENCOUNTER — APPOINTMENT (OUTPATIENT)
Dept: HEPATOLOGY | Facility: CLINIC | Age: 53
End: 2023-05-31
Payer: COMMERCIAL

## 2023-05-31 ENCOUNTER — NON-APPOINTMENT (OUTPATIENT)
Age: 53
End: 2023-05-31

## 2023-05-31 VITALS
HEIGHT: 76 IN | HEART RATE: 69 BPM | WEIGHT: 250 LBS | DIASTOLIC BLOOD PRESSURE: 76 MMHG | RESPIRATION RATE: 14 BRPM | SYSTOLIC BLOOD PRESSURE: 151 MMHG | OXYGEN SATURATION: 97 % | BODY MASS INDEX: 30.44 KG/M2 | TEMPERATURE: 98 F

## 2023-05-31 PROCEDURE — 99214 OFFICE O/P EST MOD 30 MIN: CPT

## 2023-05-31 RX ORDER — CLONAZEPAM 0.5 MG/1
0.5 TABLET ORAL
Qty: 90 | Refills: 0 | Status: DISCONTINUED | COMMUNITY
Start: 2020-05-19 | End: 2023-05-31

## 2023-05-31 RX ORDER — METOPROLOL SUCCINATE 50 MG/1
50 TABLET, EXTENDED RELEASE ORAL
Qty: 30 | Refills: 0 | Status: DISCONTINUED | COMMUNITY
Start: 2020-04-21 | End: 2023-05-31

## 2023-05-31 RX ORDER — PREDNISONE 20 MG/1
20 TABLET ORAL
Qty: 10 | Refills: 0 | Status: DISCONTINUED | COMMUNITY
Start: 2020-05-05 | End: 2023-05-31

## 2023-05-31 RX ORDER — NALOXONE HYDROCHLORIDE NASAL 4 MG/.1ML
4 SPRAY NASAL
Qty: 2 | Refills: 0 | Status: DISCONTINUED | COMMUNITY
Start: 2020-03-26 | End: 2023-05-31

## 2023-05-31 RX ORDER — LOSARTAN POTASSIUM 100 MG/1
100 TABLET, FILM COATED ORAL
Qty: 1 | Refills: 11 | Status: DISCONTINUED | COMMUNITY
Start: 2018-11-01 | End: 2023-05-31

## 2023-05-31 RX ORDER — METHYLPHENIDATE HYDROCHLORIDE 27 MG/1
27 TABLET, EXTENDED RELEASE ORAL
Qty: 30 | Refills: 0 | Status: DISCONTINUED | COMMUNITY
Start: 2022-01-24 | End: 2023-05-31

## 2023-05-31 RX ORDER — DIAZEPAM 10 MG/1
10 TABLET ORAL
Qty: 30 | Refills: 3 | Status: DISCONTINUED | COMMUNITY
Start: 2022-08-25 | End: 2023-05-31

## 2023-05-31 RX ORDER — METHYLPHENIDATE HYDROCHLORIDE 18 MG/1
18 TABLET, EXTENDED RELEASE ORAL
Qty: 30 | Refills: 0 | Status: DISCONTINUED | COMMUNITY
Start: 2020-05-19 | End: 2023-05-31

## 2023-05-31 RX ORDER — FLUTICASONE FUROATE AND VILANTEROL TRIFENATATE 200; 25 UG/1; UG/1
200-25 POWDER RESPIRATORY (INHALATION) DAILY
Qty: 1 | Refills: 5 | Status: DISCONTINUED | COMMUNITY
Start: 2020-11-20 | End: 2023-05-31

## 2023-06-01 ENCOUNTER — NON-APPOINTMENT (OUTPATIENT)
Age: 53
End: 2023-06-01

## 2023-06-01 LAB
ALBUMIN SERPL ELPH-MCNC: 4.2 G/DL
ALP BLD-CCNC: 82 U/L
ALT SERPL-CCNC: 26 U/L
ANION GAP SERPL CALC-SCNC: 11 MMOL/L
AST SERPL-CCNC: 30 U/L
BILIRUB DIRECT SERPL-MCNC: 0.4 MG/DL
BILIRUB SERPL-MCNC: 1.5 MG/DL
BUN SERPL-MCNC: 11 MG/DL
CALCIUM SERPL-MCNC: 9.6 MG/DL
CHLORIDE SERPL-SCNC: 100 MMOL/L
CO2 SERPL-SCNC: 26 MMOL/L
CREAT SERPL-MCNC: 0.92 MG/DL
EGFR: 99 ML/MIN/1.73M2
ESTIMATED AVERAGE GLUCOSE: 100 MG/DL
GLUCOSE SERPL-MCNC: 129 MG/DL
HBA1C MFR BLD HPLC: 5.1 %
INR PPP: 1.24 RATIO
POTASSIUM SERPL-SCNC: 4.4 MMOL/L
PROT SERPL-MCNC: 6.7 G/DL
PT BLD: 14.5 SEC
SODIUM SERPL-SCNC: 137 MMOL/L
TSH SERPL-ACNC: 1.62 UIU/ML

## 2023-06-01 NOTE — PHYSICAL EXAM
[Alert] : alert [Healthy Appearing] : healthy appearing [Supple] : supple [Clear to Auscultation] : lungs were clear to auscultation bilaterally [Breathing Comfortably on room air] : breathing comfortably on room air [Normal Rate] : normal rate [Regular Rhythm] : regular rhythm [Soft] : soft [Normal Bowel Sounds] : normal bowel sounds [No Edema] : no edema [Scleral Icterus] : no scleral icterus [Abdominal Ascites] : no abdominal ascites [Jaundice] : no jaundice [Hepatic Encephalopathy] : no hepatic encephalopathy [de-identified] : +bloating 2/2 stool and gas

## 2023-06-01 NOTE — PLAN
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 4/10/23 AFP is WNL. Maintain biannual screening. \par - Counseled on importance of maintaining high protein and low Na diet\par - Encouraged to continue working on weight loss and will consider GLP-1 like 'Ozempic' in coming months to aid with weight loss if weight plateaus \par - Reinforced importance of absolute ETOH abstinence and risk for liver decompensation with relapse\par - C/w Suboxone + PETH screening and f/u with addiction counselor\par - Will refer to Neurology for w/u of persistent neuropathic pain/sx\par - Advised to avoid nephrotoxic agents\par - Reinforced s/s of liver decompensation and advised to report immediately\par - No indication for liver txp evaluation given well compensated disease and low MELD; will continue following clinical trajectory and refer if appropriate\par \par Update labs today. \par RTC in 3-4 months. \par Patient seen and plan discussed with Dr. Vasquez. \par \par Janki Frausto, MSN, AGACNP-BC\par Transplant Hepatology Nurse Practitioner\par [facility]\par [address] [place] [id number]\par T: [phone] | F: [phone]

## 2023-06-01 NOTE — HISTORY OF PRESENT ILLNESS
[TextBox_42] : Transplant Hepatologist: Dr. Bryant Vasquez\par Transplant Hepatology NP: Janki Frausto, [facility]\par \par Aman Saba is a 53 y/o male with a PMH of HTN, Pre-T2DM, Obesity, and ETOH Cirrhosis, here for follow-up. \par \par Patient established care with us after referral from Dr. Damon for evaluation of his liver disease. Decompensation with HE and ascites s/p LVP (no hx of SBP). Underwent a liver bx on 22 that was pertinent for Grade 2 Steatohepatitis with cirrhosis while hospitalized for episode of decompensation. No additional episodes of decompensation since 2022. No hx of EVH. Uncle  of ETOH Cirrhosis. Longstanding hx of obesity with heaviest weight ~ 300 lbs and has since lost 100 lbs with dieting and exercise. Started ETOH use at age 16 and started drinking regularly (~3x/week with 5-6 drinks/night) at age 24. About 7 years ago patient was unemployed and started drinking ~1 pint of liquor/night. Last drink on 4/15/22 prior to hospitalization. S/p RPP and engaged with mental health/addiction counselor. No IVDU. 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 Lives with wife and children at home. Independent in ADLs/iADLs. \par \par In the interim since last visit, there have been no interim illnesses or hospitalizations. No episodes of liver decompensation. Patient's allergies, medications, past medical, surgical, family, and social histories were reviewed and updated as appropriate. Seen in clinic today, reports that he is in baseline state of health with persistent upper/lower extremity neuropathic pain and constipation despite use of PO Lactulose and Miralax. Reports mental 'fogginess.' Denies any recent fevers, chills, cough, lightheadedness, AMS, abdominal pain, n/v, diarrhea, hematochezia, hematemesis, and melena. Denies alcohol, tobacco, or recreational drug use. Still engaged with addiction counselor. Frustrated with plateau in weight, current weight 250 lbs.

## 2023-06-01 NOTE — REVIEW OF SYSTEMS
[Fatigue] : fatigue [Constipation] : constipation [Frequency] : frequency [Fever] : no fever [Chills] : no chills [Recent Weight Gain (___ Lbs)] : no recent weight gain [Recent Weight Loss (___ Lbs)] : no recent weight loss [Chest Pain] : no chest pain [Palpitations] : no palpitations [SOB] : no shortness of breath [Cough] : no cough [Abdominal Pain] : no abdominal pain [Nausea] : no nausea [Diarrhea] : diarrhea [Vomiting] : no vomiting [Dysuria] : no dysuria [Hematuria] : no hematuria [Itching] : no itching [Headache] : no headache [Dizziness] : no dizziness [Confusion] : no confusion

## 2023-06-06 ENCOUNTER — APPOINTMENT (OUTPATIENT)
Dept: NEUROLOGY | Facility: CLINIC | Age: 53
End: 2023-06-06
Payer: COMMERCIAL

## 2023-06-06 VITALS
WEIGHT: 250 LBS | SYSTOLIC BLOOD PRESSURE: 139 MMHG | HEART RATE: 70 BPM | DIASTOLIC BLOOD PRESSURE: 72 MMHG | HEIGHT: 76 IN | BODY MASS INDEX: 30.44 KG/M2

## 2023-06-06 DIAGNOSIS — R53.1 WEAKNESS: ICD-10-CM

## 2023-06-06 PROCEDURE — 99205 OFFICE O/P NEW HI 60 MIN: CPT

## 2023-06-06 NOTE — DISCUSSION/SUMMARY
[FreeTextEntry1] : 52-year-old gentleman who has stage IV cirrhosis from alcohol and pain pill usage, history of prediabetes who is here for initial consultation of bilateral hand weakness and leg weakness along with sensory disturbances in the ulnar and median distribution.  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  Will obtain the records from Dr. Owen's office for review.  We will obtain reversible causes of neuropathy blood work.  Patient will increase the gabapentin to 300 mg 3 times a day slowly.  Side effects of sedation with each increase was discussed with the patient in detail and he will start slow increase.  Patient will follow-up with me to discuss the blood work results.\par \par I spent the time noted on the day of this patient encounter preparing for, providing and documenting the above E/M service and counseling and educate patient on differential, workup, disease course, and treatment/management. Education was provided to the patient during this encounter. All questions and concerns were answered and addressed in detail. The patient verbalized understanding and agreed to plan. Patient was advised to continue to monitor for neurologic symptoms and to notify my office or go to the nearest emergency room if there are any changes. Any orders/referrals and communications were provided as well. \par Side effects of the above medications were discussed in detail including but not limited to applicable black box warning and teratogenicity as appropriate. \par Patient was advised to bring previous records to my office. \par \par \par

## 2023-06-06 NOTE — HISTORY OF PRESENT ILLNESS
[FreeTextEntry1] : 52-year-old gentleman who is here for initial consultation of numbness and weakness in his arms and his feet bilaterally.  Patient states that he saw Dr. Owen who performed a nerve conduction study and EMG along with blood test and referred him to this office.  Patient has numbness and weakness particularly in the fourth and fifth digits bilaterally.  Patient has decreased muscle bulk in the right first dorsal interosseous space with the right side more affected than the left.  Patient states that this happened about 4 years ago.  In December 2022 patient also obtain blood work which showed some abnormalities and he was referred to see hematology.  Patient was tried on gabapentin 300 mg once a day but it did not help so he stopped.

## 2023-06-06 NOTE — PHYSICAL EXAM
[General Appearance - Alert] : alert [Oriented To Time, Place, And Person] : oriented to person, place, and time [Person] : oriented to person [Place] : oriented to place [Time] : oriented to time [Short Term Intact] : short term memory intact [Fluency] : fluency intact [Current Events] : adequate knowledge of current events [Cranial Nerves Optic (II)] : visual acuity intact bilaterally,  visual fields full to confrontation, pupils equal round and reactive to light [Cranial Nerves Oculomotor (III)] : extraocular motion intact [Cranial Nerves Trigeminal (V)] : facial sensation intact symmetrically [Cranial Nerves Facial (VII)] : face symmetrical [Cranial Nerves Vestibulocochlear (VIII)] : hearing was intact bilaterally [Cranial Nerves Accessory (XI - Cranial And Spinal)] : head turning and shoulder shrug symmetric [Cranial Nerves Hypoglossal (XII)] : there was no tongue deviation with protrusion [Dysesthesia] : dysesthesia was present [Hyperesthesia] : hyperesthesia was present [Coordination - Dysmetria Impaired Finger-to-Nose Bilateral] : not present [1+] : Biceps left 1+ [0] : Patella left 0 [FreeTextEntry6] : Decreased muscle bulk of the first dorsal interosseous space bilaterally but the right more than the left.  There is also ulnar distribution of weakness in the hands bilaterally. [FreeTextEntry8] : Wide-based gait.  Patient had difficulty with toe walking.  Patient has slight difficulty with heel walking as well.

## 2023-06-08 LAB — PHOSPHATIDYETHANOL (PETH), WHOLE BLOOD: NEGATIVE

## 2023-06-13 ENCOUNTER — NON-APPOINTMENT (OUTPATIENT)
Age: 53
End: 2023-06-13

## 2023-06-13 LAB
25(OH)D3 SERPL-MCNC: 24.4 NG/ML
AFP-TM SERPL-MCNC: 2.2 NG/ML
ALBUMIN SERPL ELPH-MCNC: 4.4 G/DL
ALP BLD-CCNC: 82 U/L
ALT SERPL-CCNC: 28 U/L
ANION GAP SERPL CALC-SCNC: 13 MMOL/L
AST SERPL-CCNC: 32 U/L
BILIRUB SERPL-MCNC: 1.4 MG/DL
BUN SERPL-MCNC: 12 MG/DL
CALCIUM SERPL-MCNC: 9.3 MG/DL
CHLORIDE SERPL-SCNC: 100 MMOL/L
CO2 SERPL-SCNC: 23 MMOL/L
CREAT SERPL-MCNC: 0.97 MG/DL
DEPRECATED KAPPA LC FREE/LAMBDA SER: 1.03 RATIO
EGFR: 93 ML/MIN/1.73M2
ERYTHROCYTE [SEDIMENTATION RATE] IN BLOOD BY WESTERGREN METHOD: 14 MM/HR
FOLATE SERPL-MCNC: 11.7 NG/ML
GLIADIN IGA SER QL: 8.6 UNITS
GLIADIN IGG SER QL: <5 UNITS
GLIADIN PEPTIDE IGA SER-ACNC: NEGATIVE
GLIADIN PEPTIDE IGG SER-ACNC: NEGATIVE
GLUCOSE SERPL-MCNC: 113 MG/DL
HBV CORE IGG+IGM SER QL: NONREACTIVE
HBV SURFACE AB SER QL: NONREACTIVE
HBV SURFACE AG SER QL: NONREACTIVE
HCV AB SER QL: NONREACTIVE
HCV S/CO RATIO: 0.88 S/CO
KAPPA LC CSF-MCNC: 2.82 MG/DL
KAPPA LC SERPL-MCNC: 2.91 MG/DL
LDH SERPL-CCNC: 178 U/L
LEAD BLD-MCNC: 1.2 UG/DL
POTASSIUM SERPL-SCNC: 4.8 MMOL/L
PROT SERPL-MCNC: 6.8 G/DL
SODIUM SERPL-SCNC: 136 MMOL/L
T PALLIDUM AB SER QL IA: NEGATIVE
T4 SERPL-MCNC: 7 UG/DL
TSH SERPL-ACNC: 2.06 UIU/ML
VIT B12 SERPL-MCNC: 524 PG/ML

## 2023-06-14 ENCOUNTER — TRANSCRIPTION ENCOUNTER (OUTPATIENT)
Age: 53
End: 2023-06-14

## 2023-06-15 LAB
A-TOCOPHEROL VIT E SERPL-MCNC: 6 MG/L
ALBUMIN MFR SERPL ELPH: 59.6 %
ALBUMIN SERPL-MCNC: 4.1 G/DL
ALBUMIN/GLOB SERPL: 1.5 RATIO
ALPHA1 GLOB MFR SERPL ELPH: 3.9 %
ALPHA1 GLOB SERPL ELPH-MCNC: 0.3 G/DL
ALPHA2 GLOB MFR SERPL ELPH: 7.9 %
ALPHA2 GLOB SERPL ELPH-MCNC: 0.5 G/DL
B-GLOBULIN MFR SERPL ELPH: 13.6 %
B-GLOBULIN SERPL ELPH-MCNC: 0.9 G/DL
BETA+GAMMA TOCOPHEROL SERPL-MCNC: 1.4 MG/L
COPPER SERPL-MCNC: 98 UG/DL
CRYOGLOB SERPL-MCNC: NEGATIVE
GAMMA GLOB FLD ELPH-MCNC: 1 G/DL
GAMMA GLOB MFR SERPL ELPH: 15 %
INTERPRETATION SERPL IEP-IMP: NORMAL
M PROTEIN SPEC IFE-MCNC: NORMAL
MERCURY BLD-MCNC: 1.5 UG/L
PROT SERPL-MCNC: 6.8 G/DL
PROT SERPL-MCNC: 6.8 G/DL
ZINC SERPL-MCNC: 70 UG/DL

## 2023-06-16 LAB
ALBUPE: 16.5 %
ALPHA1UPE: 34.1 %
ALPHA2UPE: 22.6 %
BENCE JONES EXCRETION: 0 MG/24HR
BETAUPE: 16.7 %
CREAT 24H UR-MCNC: 2.4 G/24 H
CREATININE UR (MAYO): 141 MG/DL
GAMMAUPE: 10.1 %
IGA 24H UR QL IFE: NORMAL
KAPPA LC 24H UR QL: 0 MG/DL
M PROTEIN 24H MFR UR ELPH: 0 %
PROT ?TM UR-MCNC: 24 HR
PROT PATTERN 24H UR ELPH-IMP: NORMAL
PROT UR-MCNC: 7 MG/DL
PROT UR-MCNC: 7 MG/DL
SPECIMEN VOL 24H UR: 1700 ML
U PROTEIN QNT CALCULATION: 119 MG/24 H
VGCC-P/Q BIND AB SER-SCNC: 10.5 PMOL/L
VIT B6 SERPL-MCNC: 7.9 UG/L

## 2023-06-19 LAB
ARSENIC, BLOOD: <10 NG/ML
HU AB SER QL: NEGATIVE

## 2023-06-20 NOTE — ED ADULT NURSE NOTE - NSIMPLEMENTINTERV_GEN_ALL_ED
Pt mother calling, states pt only has enough medication for tonight (6/20/2023).   Implemented All Universal Safety Interventions:  [place] to call system. Call bell, personal items and telephone within reach. Instruct patient to call for assistance. Room bathroom lighting operational. Non-slip footwear when patient is off stretcher. Physically safe environment: no spills, clutter or unnecessary equipment. Stretcher in lowest position, wheels locked, appropriate side rails in place.

## 2023-06-21 LAB
PURKINJE CELLS AB SER QL IF: NEGATIVE
SULFATIDE AB SER QL: NORMAL
SULFATIDE ANTIBODIES COMMENTS: NORMAL
SULFATIDE ANTIBODIES METHODS: NORMAL
SULFATIDE ANTIBODIES REFERENCES: NORMAL
SULFATIDE ANTIBODIES TECHNICAL RESULTS: NORMAL

## 2023-06-22 LAB — MAG AB SER QL: NEGATIVE

## 2023-06-23 LAB — GQ1B AB IGG: NORMAL TITER

## 2023-06-23 NOTE — CONSULT NOTE ADULT - MINUTES
60 Elidel Counseling: Patient may experience a mild burning sensation during topical application. Elidel is not approved in children less than 2 years of age. There have been case reports of hematologic and skin malignancies in patients using topical calcineurin inhibitors although causality is questionable.

## 2023-07-18 ENCOUNTER — RESULT REVIEW (OUTPATIENT)
Age: 53
End: 2023-07-18

## 2023-07-18 ENCOUNTER — OUTPATIENT (OUTPATIENT)
Dept: OUTPATIENT SERVICES | Facility: HOSPITAL | Age: 53
LOS: 1 days | Discharge: HOME | End: 2023-07-18

## 2023-07-18 ENCOUNTER — APPOINTMENT (OUTPATIENT)
Dept: HEMATOLOGY ONCOLOGY | Facility: CLINIC | Age: 53
End: 2023-07-18
Payer: COMMERCIAL

## 2023-07-18 VITALS
WEIGHT: 262.25 LBS | OXYGEN SATURATION: 96 % | RESPIRATION RATE: 18 BRPM | SYSTOLIC BLOOD PRESSURE: 124 MMHG | TEMPERATURE: 98.2 F | HEART RATE: 73 BPM | BODY MASS INDEX: 31.93 KG/M2 | DIASTOLIC BLOOD PRESSURE: 72 MMHG | HEIGHT: 76 IN

## 2023-07-18 DIAGNOSIS — Z96.651 PRESENCE OF RIGHT ARTIFICIAL KNEE JOINT: Chronic | ICD-10-CM

## 2023-07-18 DIAGNOSIS — Z98.890 OTHER SPECIFIED POSTPROCEDURAL STATES: Chronic | ICD-10-CM

## 2023-07-18 DIAGNOSIS — Z98.1 ARTHRODESIS STATUS: Chronic | ICD-10-CM

## 2023-07-18 DIAGNOSIS — D69.6 THROMBOCYTOPENIA, UNSPECIFIED: ICD-10-CM

## 2023-07-18 LAB
BASOPHILS # BLD AUTO: 0.04 K/UL — SIGNIFICANT CHANGE UP (ref 0–0.2)
BASOPHILS NFR BLD AUTO: 0.7 % — SIGNIFICANT CHANGE UP (ref 0–2)
EOSINOPHIL # BLD AUTO: 0.08 K/UL — SIGNIFICANT CHANGE UP (ref 0–0.5)
EOSINOPHIL NFR BLD AUTO: 1.4 % — SIGNIFICANT CHANGE UP (ref 0–6)
HCT VFR BLD CALC: 40.7 % — SIGNIFICANT CHANGE UP (ref 39–50)
HGB BLD-MCNC: 13.8 G/DL — SIGNIFICANT CHANGE UP (ref 13–17)
IMM GRANULOCYTES NFR BLD AUTO: 0.5 % — SIGNIFICANT CHANGE UP (ref 0–0.9)
LYMPHOCYTES # BLD AUTO: 1.88 K/UL — SIGNIFICANT CHANGE UP (ref 1–3.3)
LYMPHOCYTES # BLD AUTO: 32.2 % — SIGNIFICANT CHANGE UP (ref 13–44)
MCHC RBC-ENTMCNC: 30.8 PG — SIGNIFICANT CHANGE UP (ref 27–34)
MCHC RBC-ENTMCNC: 33.9 GM/DL — SIGNIFICANT CHANGE UP (ref 32–36)
MCV RBC AUTO: 90.8 FL — SIGNIFICANT CHANGE UP (ref 80–100)
MONOCYTES # BLD AUTO: 0.49 K/UL — SIGNIFICANT CHANGE UP (ref 0–0.9)
MONOCYTES NFR BLD AUTO: 8.4 % — SIGNIFICANT CHANGE UP (ref 2–14)
NEUTROPHILS # BLD AUTO: 3.32 K/UL — SIGNIFICANT CHANGE UP (ref 1.8–7.4)
NEUTROPHILS NFR BLD AUTO: 56.8 % — SIGNIFICANT CHANGE UP (ref 43–77)
NRBC # BLD: 0 /100 WBCS — SIGNIFICANT CHANGE UP (ref 0–0)
PLAT MORPH BLD: NORMAL — SIGNIFICANT CHANGE UP
PLATELET # BLD AUTO: 104 K/UL — LOW (ref 150–400)
RBC # BLD: 4.48 M/UL — SIGNIFICANT CHANGE UP (ref 4.2–5.8)
RBC # FLD: 13.9 % — SIGNIFICANT CHANGE UP (ref 10.3–14.5)
RBC BLD AUTO: NORMAL — SIGNIFICANT CHANGE UP
WBC # BLD: 5.84 K/UL — SIGNIFICANT CHANGE UP (ref 3.8–10.5)
WBC # FLD AUTO: 5.84 K/UL — SIGNIFICANT CHANGE UP (ref 3.8–10.5)

## 2023-07-18 PROCEDURE — 99203 OFFICE O/P NEW LOW 30 MIN: CPT

## 2023-07-18 NOTE — HISTORY OF PRESENT ILLNESS
[de-identified] : This 52-year-old male was referred by Dr. Hutchison from neurology, for further evaluation of thrombocytopenia (platelet count 104,000) and mild elevations in free light chains (kappa 2.91, lambda 2.82, kappa/lambda ratio 1.03.)\par The remainder of the CBC is normal, and serum immunofixation shows no evidence of monoclonal band.\christiano Newton has a history of alcoholic cirrhosis, and is being monitored by hepatology.  He is deemed not appropriate at the present time for liver transplantation.\par Denies bleeding or bruising.\par  [de-identified] : Being evaluated by Dr. Hutchison for numbness and weakness in his hands, as well as sensory disturbance in the ulnar and median distribution.

## 2023-07-18 NOTE — REASON FOR VISIT
[Initial Consultation] : an initial consultation for [FreeTextEntry2] : Abnormal serum free light chains, thrombocytopenia

## 2023-07-18 NOTE — ASSESSMENT
[FreeTextEntry1] : Thrombocytopenia in this patient is secondary to chronic liver disease, with portal hypertension and increased splenic sequestration.\par The abnormalities in serum free light chains are not clinically significant and require no further work-up.  There is no evidence of monoclonal gammopathy in this case.\par Available for future consultation as needed.

## 2023-07-19 DIAGNOSIS — K74.60 UNSPECIFIED CIRRHOSIS OF LIVER: ICD-10-CM

## 2023-07-31 ENCOUNTER — NON-APPOINTMENT (OUTPATIENT)
Age: 53
End: 2023-07-31

## 2023-08-01 NOTE — PATIENT PROFILE ADULT - OVER THE PAST TWO WEEKS HAVE YOU FELT DOWN, DEPRESSED OR HOPELESS?
Show Ultrasound In Note?: No Bill For Evaluation (40539)?: Yes Assessment: Appropriate reaction Ultrasound Not Used Text: Ultrasound was not performed today due to Evaluation Plan: The patient is undergoing superficial radiation therapy for skin cancer and presents for weekly evaluation and management. Per protocol and as documented on the flow sheet, the patient was questioned as to subjective redness, pruritus, pain, drainage, fatigue, or any other symptoms. Objectively, the radiation area was evaluated with regards to erythema, atrophy, scale, crusting, erosion, ulceration, edema, purpura, tenderness, warmth, drainage, and any other findings. The plan was extensively reviewed including dose and dosing schedule. The simulation and clinical setup were also reviewed as were external and any internal shields and based on this review the appropriateness and sufficiency of treatment was determined.\\n\\nA high-frequency ultrasound image was acquired today for a two-dimensional evaluation of the tumor volume, depth, width, breadth, review of prior response to treatment, provide geometric accuracy of field placement, and determine whether to proceed with therapeutic delivery.\\n\\nPer Dr. Owusu , continued ultrasound guidance and therapeutic radiology simulation-aided field setting verification per fraction is required for field placement, measurement of tumor depth, tissues evaluation, progress, acute effect monitoring, and determination for therapeutic treatment delivery is appropriate. Ultrasound Used Text: Ultrasound depth is mm. Is This Visit For Evaluation During Treatment Or Follow Up Post Treatment?: evaluation Radiation Therapy Oncology Group (Rtog) Score: 2 Additional Comments (Add Customization Of Note Here): Patient has no issues. no

## 2023-08-21 ENCOUNTER — APPOINTMENT (OUTPATIENT)
Dept: HEPATOLOGY | Facility: CLINIC | Age: 53
End: 2023-08-21
Payer: COMMERCIAL

## 2023-08-21 VITALS
HEIGHT: 76 IN | OXYGEN SATURATION: 95 % | HEART RATE: 77 BPM | WEIGHT: 260 LBS | DIASTOLIC BLOOD PRESSURE: 70 MMHG | RESPIRATION RATE: 14 BRPM | TEMPERATURE: 97.5 F | BODY MASS INDEX: 31.66 KG/M2 | SYSTOLIC BLOOD PRESSURE: 126 MMHG

## 2023-08-21 PROCEDURE — 99215 OFFICE O/P EST HI 40 MIN: CPT

## 2023-08-22 ENCOUNTER — NON-APPOINTMENT (OUTPATIENT)
Age: 53
End: 2023-08-22

## 2023-08-22 DIAGNOSIS — K75.81 NONALCOHOLIC STEATOHEPATITIS (NASH): ICD-10-CM

## 2023-08-22 DIAGNOSIS — E66.09 OTHER OBESITY DUE TO EXCESS CALORIES: ICD-10-CM

## 2023-08-22 LAB
AFP-TM SERPL-MCNC: 2 NG/ML
ALBUMIN SERPL ELPH-MCNC: 4.6 G/DL
ALP BLD-CCNC: 75 U/L
ALT SERPL-CCNC: 20 U/L
ANION GAP SERPL CALC-SCNC: 11 MMOL/L
AST SERPL-CCNC: 28 U/L
BILIRUB DIRECT SERPL-MCNC: 0.3 MG/DL
BILIRUB SERPL-MCNC: 1.3 MG/DL
BUN SERPL-MCNC: 16 MG/DL
CALCIUM SERPL-MCNC: 9.5 MG/DL
CHLORIDE SERPL-SCNC: 102 MMOL/L
CHOLEST SERPL-MCNC: 162 MG/DL
CO2 SERPL-SCNC: 25 MMOL/L
CREAT SERPL-MCNC: 0.95 MG/DL
EGFR: 95 ML/MIN/1.73M2
ESTIMATED AVERAGE GLUCOSE: 97 MG/DL
GGT SERPL-CCNC: 41 U/L
GLUCOSE SERPL-MCNC: 80 MG/DL
HBA1C MFR BLD HPLC: 5 %
HDLC SERPL-MCNC: 53 MG/DL
INR PPP: 1.07 RATIO
LDLC SERPL CALC-MCNC: 93 MG/DL
NONHDLC SERPL-MCNC: 109 MG/DL
POTASSIUM SERPL-SCNC: 4.6 MMOL/L
PROT SERPL-MCNC: 7 G/DL
PT BLD: 12.1 SEC
SODIUM SERPL-SCNC: 138 MMOL/L
TRIGL SERPL-MCNC: 89 MG/DL

## 2023-08-22 NOTE — HISTORY OF PRESENT ILLNESS
[TextBox_42] : Transplant Hepatologist: Dr. Bryant Vasquez Transplant Hepatology NP: Janki Frausto, [facility]  Amna Saba is a 51 y/o male with a PMH of HTN, Pre-T2DM, Obesity, and ETOH Cirrhosis, here for follow-up.  Prior HPI: Patient established care with us after referral from Dr. Damon for evaluation of his liver disease. Decompensation with HE and ascites s/p LVP (no hx of SBP). Underwent a liver bx on 22 that was pertinent for Grade 2 Steatohepatitis with cirrhosis while hospitalized for episode of decompensation. No additional episodes of decompensation since 2022. No hx of EVH. Uncle  of ETOH Cirrhosis. Longstanding hx of obesity with heaviest weight ~ 300 lbs and has since lost 100 lbs with dieting and exercise. Started ETOH use at age 16 and started drinking regularly (~3x/week with 5-6 drinks/night) at age 24. About 7 years ago patient was unemployed and started drinking ~1 pint of liquor/night. Last drink on 4/15/22 prior to hospitalization. S/p RPP and engaged with mental health/addiction counselor. No IVDU. 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 Lives with wife and children at home. Independent in ADLs/iADLs.  In the interim since last visit, there have been no interim illnesses or hospitalizations. No episodes of liver decompensation. Patient's allergies, medications, past medical, surgical, family, and social histories were reviewed and updated as appropriate. Seen in clinic today, reports that he is in baseline state of health with persistent upper/lower extremity neuropathic pain -- evaluated by Dr. Hutchison in Neurology and w/u negative (maintained on Gabapentin). Complains of increased incidence of forgetfulness, difficulty concentrating, and lethargy. Having issue with CPAP mask leaking. Denies any recent fevers, chills, cough, lightheadedness, AMS, abdominal pain, n/v, diarrhea, hematochezia, hematemesis, and melena. Denies alcohol, tobacco, or recreational drug use. Still engaged with addiction counselor. ~10 lb weight gain since last visit despite making effort to alter diet and increase physical activity.   MELD 9

## 2023-08-22 NOTE — PLAN
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 + Fatty liver r/t metabolic syndrome, well compensated, MELD 9 - Euvolemic on exam, no indication for diuretics - C/w daily weights and report > 2-3 lb/day weight gain - HE: uncontrolled > obtain MRI Abd to evaluate for PVT & c/w Xifaxan 550 mg BID + increase Lactulose to TID and start Miralax 17 g PO BID - Pt still driving > reinforced he is not allowed to drive or operate heavy machinery given dx of HE  - Pt will f/u to get CPAP mask fixed  - pHTN: 9/29/22 EGD w/o EV or portal HTN gastropathy, no indication for BB as primary ppx - HCC: 5/24/23 MRI Abd w/o c/f malignancy, patent vasculature and small paraesophageal varices. 4/10/23 AFP is WNL. Maintain biannual screening. - Counseled on importance of maintaining high protein and low Na diet - Extensively counseled on importance of optimizing metabolic profile with intentional weight loss > will rx Ozempic v Wegovy to aid with weight loss pending updated labs - Reinforced importance of absolute ETOH abstinence and risk for liver decompensation with relapse - C/w PETH screening and f/u with addiction counselor - Advised to avoid nephrotoxic agents - Reinforced s/s of liver decompensation and advised to report immediately - No indication for liver txp evaluation given well compensated disease and low MELD; will continue following clinical trajectory and refer if appropriate  Update labs today. RTC in 6 weeks. Plan discussed with Dr. Vasquez.  Janki Frausto, MSN, [facility] Hepatology Nurse Practitioner [facility] [address] [place] [address] T: [phone] | F: [phone].

## 2023-08-22 NOTE — REVIEW OF SYSTEMS
[Constipation] : constipation [Confusion] : confusion [Fever] : no fever [Chills] : no chills [Fatigue] : no fatigue [Chest Pain] : no chest pain [Palpitations] : no palpitations [SOB] : no shortness of breath [Cough] : no cough [Abdominal Pain] : no abdominal pain [Nausea] : no nausea [Diarrhea] : diarrhea [Vomiting] : no vomiting [Dysuria] : no dysuria [Hematuria] : no hematuria [Itching] : no itching [Headache] : no headache [Dizziness] : no dizziness

## 2023-08-22 NOTE — PHYSICAL EXAM
[Alert] : alert [Supple] : supple [Clear to Auscultation] : lungs were clear to auscultation bilaterally [Breathing Comfortably on room air] : breathing comfortably on room air [Normal Rate] : normal rate [Regular Rhythm] : regular rhythm [Soft] : soft [Normal Bowel Sounds] : normal bowel sounds [No Edema] : no edema [Scleral Icterus] : no scleral icterus [Ascites Shifting Dullness] : no ascites shifting dullness [Jaundice] : no jaundice [Hepatic Encephalopathy] : no hepatic encephalopathy

## 2023-08-22 NOTE — ASSESSMENT
[FreeTextEntry1] : Aman Saba is a 53 y/o male with compensated cirrhosis 2/2 ETOH + fatty liver r/t metabolic syndrome, here for follow-up.

## 2023-08-24 ENCOUNTER — APPOINTMENT (OUTPATIENT)
Dept: UROLOGY | Facility: CLINIC | Age: 53
End: 2023-08-24
Payer: COMMERCIAL

## 2023-08-24 DIAGNOSIS — M62.89 OTHER SPECIFIED DISORDERS OF MUSCLE: ICD-10-CM

## 2023-08-24 PROCEDURE — 99213 OFFICE O/P EST LOW 20 MIN: CPT

## 2023-08-25 LAB — TESTOST SERPL-MCNC: 1226 NG/DL

## 2023-08-25 NOTE — ASSESSMENT
[FreeTextEntry1] : 53 yo M with PFD and low testosterone now with some low energy requesting dose increase.  For Low testosterone will obtain testosterone (last injection 6 days ago) and then determine if dose can be increased For PFD, discussed PFPT but pt declines and prefers to use D10 PRN flare ups.

## 2023-08-25 NOTE — HISTORY OF PRESENT ILLNESS
[FreeTextEntry1] : 46 yo M with Low Testosterone seen 3/2018 with labs (2/2018) Total 281, free 3.4. He reports depression, low libido and fatigue. It is associated with erectile dysfunction. Of note, pt was found to have elevated LFTs. Dr Damon gave clearance for TRT. He also reports mild LUTS with hesitancy. He was started on TRT and found 300 mg IM q2 weeks to control his symptoms.   1/3/19: Patient presents for follow up. He has been administering TRT injections without complaint. Good control of his hypogonadism symptoms. LUTS not worse or bothersome. No hematuria, no dysuria, no frequency, no urgency. No incontinence. No fevers, no chills, no nausea, no vomiting, no flank pain.  He had been scheduled for labs prior to this visit, but he has not yet obtained them.   06/25/2020: Patient presents for follow up. He has not been seen since 1/2019. In that time he stopped administering TRT and developed gynecomastia. He reports fatigue, low libido. No new  symptoms and other medical  issues remain unchanged from above. No hematuria, no dysuria, no frequency, no urgency, no hesitancy, no straining. No incontinence. No fevers, no chills, no nausea, no vomiting, no flank pain.   02/03/2022: Patient presents for follow up. He reports perineal pain radiating to penis. Mild dysuria. No other acute complaints. He does report ED, and asking for renewal of tadalafil.   03/18/2022: Patient presents for follow up. He reports lower extremity rash and swelling, he atrributes to abx. He reports continued perineal pain, dribbling, dysuria. Rash on penis.   08/25/2022: Patient presents for follow up. He reports his symptoms of dysuria and pelvic pressure are not improving despite antibiotics and UA/UCx prior to his starting abx are noted to be negative. Patient reports he would also like to restart his testosterone replacement therapy because his testosterone is 100 and he feels low libido and tiredness. He also states his hepatologist is monitoring him and suggested he can restart his TRT. No hematuria, No fevers, no chills, no nausea, no vomiting, no flank pain.  01/03/2023: Patient presents for follow up. He reports improvement of energy and mood with resumption of TRT. Feels better but does think his symptoms could be better controlled. Labs reviewed, Hb 13, psa 0.1, serum T 470. He felt improvement of pelvic pain with D10, but feels overall pain is resolved on its own.   05/04/2023: Patient presents for follow up. He reports improvement in energy and mood after increasing his TRT to 200 mg Q weekly. Labs reviewed Hb 13.6, PSA 0.54 and serum T 400. Pt is also requesting refill for D10 as he uses it for occasional PFD flare ups. Denies any new urinary symptoms today.   08/24/2023: Patient presents for follow up. He reports improvement in energy and mood after increasing his TRT to 200 mg Q weekly initially but feels low energy recently and feels increasing the dose would be helpful. He also reports he is exercising and focusing on losing weight for his overall health.  Labs reviewed Hb 13.9. Did not have recent testosterone drawn. Reports PFD has been stable and requiring D10 frequently. No new urinary complaints.

## 2023-08-25 NOTE — DISCUSSION/SUMMARY
[FreeTextEntry1] : Administered testosterone cypionate 300 mg into right buttock.\par  \par  Lot [id number]\par  Exp 01/31/2025\par  \par  Pt verbalized plan to p/u testo and supplies from pharmacy as ordered and resume self-adminstration.  Reports he is independent with task and declined teaching.

## 2023-08-28 LAB
PETH 16:0/18:1: NEGATIVE NG/ML
PETH 16:0/18:2: NEGATIVE NG/ML
PETH COMMENTS: NORMAL

## 2023-09-27 LAB
TESTOST FREE SERPL-MCNC: 7.6 PG/ML
TESTOST SERPL-MCNC: 668 NG/DL

## 2023-09-29 RX ORDER — GABAPENTIN 300 MG/1
300 CAPSULE ORAL
Qty: 90 | Refills: 3 | Status: ACTIVE | COMMUNITY
Start: 2023-06-06 | End: 1900-01-01

## 2023-10-10 ENCOUNTER — APPOINTMENT (OUTPATIENT)
Dept: HEPATOLOGY | Facility: CLINIC | Age: 53
End: 2023-10-10
Payer: COMMERCIAL

## 2023-10-10 VITALS
TEMPERATURE: 97.3 F | RESPIRATION RATE: 14 BRPM | BODY MASS INDEX: 32.27 KG/M2 | DIASTOLIC BLOOD PRESSURE: 76 MMHG | HEART RATE: 90 BPM | OXYGEN SATURATION: 96 % | SYSTOLIC BLOOD PRESSURE: 121 MMHG | HEIGHT: 76 IN | WEIGHT: 265 LBS

## 2023-10-10 DIAGNOSIS — K76.82 HEPATIC ENCEPHALOPATHY: ICD-10-CM

## 2023-10-10 PROCEDURE — 99215 OFFICE O/P EST HI 40 MIN: CPT

## 2023-10-19 ENCOUNTER — APPOINTMENT (OUTPATIENT)
Dept: PAIN MANAGEMENT | Facility: CLINIC | Age: 53
End: 2023-10-19
Payer: COMMERCIAL

## 2023-10-19 VITALS
BODY MASS INDEX: 32.27 KG/M2 | HEIGHT: 76 IN | DIASTOLIC BLOOD PRESSURE: 73 MMHG | WEIGHT: 265 LBS | SYSTOLIC BLOOD PRESSURE: 146 MMHG | HEART RATE: 76 BPM

## 2023-10-19 DIAGNOSIS — G43.709 CHRONIC MIGRAINE W/OUT AURA, NOT INTRACTABLE, W/OUT STATUS MIGRAINOSUS: ICD-10-CM

## 2023-10-19 DIAGNOSIS — G62.9 POLYNEUROPATHY, UNSPECIFIED: ICD-10-CM

## 2023-10-19 PROCEDURE — 99214 OFFICE O/P EST MOD 30 MIN: CPT

## 2023-10-26 ENCOUNTER — APPOINTMENT (OUTPATIENT)
Dept: SURGERY | Facility: CLINIC | Age: 53
End: 2023-10-26
Payer: COMMERCIAL

## 2023-10-26 VITALS — HEIGHT: 76 IN | WEIGHT: 270 LBS | BODY MASS INDEX: 32.88 KG/M2

## 2023-10-26 PROCEDURE — 99204 OFFICE O/P NEW MOD 45 MIN: CPT | Mod: 95

## 2023-11-06 ENCOUNTER — RX RENEWAL (OUTPATIENT)
Age: 53
End: 2023-11-06

## 2023-11-06 RX ORDER — DIPHENHYDRAMINE HCL 12.5 MG/5ML
25 MCG LIQUID ORAL
Qty: 90 | Refills: 0 | Status: ACTIVE | COMMUNITY
Start: 2023-11-06 | End: 1900-01-01

## 2023-12-01 NOTE — ED PROVIDER NOTE - CROS ED NEURO ALL NEG
PA is not currently needed.  This will need to be resubmitted after the new year for coverage in 2024       - - -

## 2023-12-08 DIAGNOSIS — E66.9 OBESITY, UNSPECIFIED: ICD-10-CM

## 2023-12-11 ENCOUNTER — OUTPATIENT (OUTPATIENT)
Dept: OUTPATIENT SERVICES | Facility: HOSPITAL | Age: 53
LOS: 1 days | End: 2023-12-11
Payer: COMMERCIAL

## 2023-12-11 ENCOUNTER — APPOINTMENT (OUTPATIENT)
Dept: MRI IMAGING | Facility: CLINIC | Age: 53
End: 2023-12-11
Payer: COMMERCIAL

## 2023-12-11 DIAGNOSIS — Z98.1 ARTHRODESIS STATUS: Chronic | ICD-10-CM

## 2023-12-11 DIAGNOSIS — Z96.651 PRESENCE OF RIGHT ARTIFICIAL KNEE JOINT: Chronic | ICD-10-CM

## 2023-12-11 DIAGNOSIS — K74.60 UNSPECIFIED CIRRHOSIS OF LIVER: ICD-10-CM

## 2023-12-11 DIAGNOSIS — Z98.890 OTHER SPECIFIED POSTPROCEDURAL STATES: Chronic | ICD-10-CM

## 2023-12-11 PROCEDURE — 74183 MRI ABD W/O CNTR FLWD CNTR: CPT | Mod: 26

## 2023-12-11 PROCEDURE — A9585: CPT

## 2023-12-11 PROCEDURE — 74183 MRI ABD W/O CNTR FLWD CNTR: CPT

## 2023-12-11 RX ORDER — SYRINGE,SAFETY WITH NEEDLE,3ML 22GX1 1/2"
22G X 1-1/2" SYRINGE, EMPTY DISPOSABLE MISCELLANEOUS
Qty: 6 | Refills: 3 | Status: ACTIVE | COMMUNITY
Start: 2018-06-21 | End: 1900-01-01

## 2023-12-11 RX ORDER — SYRINGE WITH NEEDLE, 1 ML 25GX5/8"
23G X 1" SYRINGE, EMPTY DISPOSABLE MISCELLANEOUS
Qty: 12 | Refills: 3 | Status: ACTIVE | COMMUNITY
Start: 2020-08-10 | End: 1900-01-01

## 2024-01-02 ENCOUNTER — RX RENEWAL (OUTPATIENT)
Age: 54
End: 2024-01-02

## 2024-01-09 ENCOUNTER — APPOINTMENT (OUTPATIENT)
Dept: HEPATOLOGY | Facility: CLINIC | Age: 54
End: 2024-01-09
Payer: COMMERCIAL

## 2024-01-09 VITALS
BODY MASS INDEX: 31.66 KG/M2 | OXYGEN SATURATION: 98 % | WEIGHT: 260 LBS | HEIGHT: 76 IN | HEART RATE: 73 BPM | DIASTOLIC BLOOD PRESSURE: 74 MMHG | TEMPERATURE: 97.4 F | SYSTOLIC BLOOD PRESSURE: 135 MMHG

## 2024-01-09 PROCEDURE — 99215 OFFICE O/P EST HI 40 MIN: CPT

## 2024-01-09 RX ORDER — SEMAGLUTIDE 0.5 MG/.5ML
0.5 INJECTION, SOLUTION SUBCUTANEOUS
Qty: 4 | Refills: 0 | Status: DISCONTINUED | COMMUNITY
Start: 2023-08-22 | End: 2024-01-09

## 2024-01-09 RX ORDER — DIAZEPAM 5 MG/1
5 TABLET ORAL
Refills: 0 | Status: DISCONTINUED | COMMUNITY
End: 2024-01-09

## 2024-01-10 LAB
AFP-TM SERPL-MCNC: 2.2 NG/ML
ALBUMIN SERPL ELPH-MCNC: 4.6 G/DL
ALP BLD-CCNC: 66 U/L
ALT SERPL-CCNC: 20 U/L
ANION GAP SERPL CALC-SCNC: 11 MMOL/L
AST SERPL-CCNC: 24 U/L
BASOPHILS # BLD AUTO: 0.03 K/UL
BASOPHILS NFR BLD AUTO: 0.5 %
BILIRUB DIRECT SERPL-MCNC: 0.3 MG/DL
BILIRUB SERPL-MCNC: 1.2 MG/DL
BUN SERPL-MCNC: 15 MG/DL
CALCIUM SERPL-MCNC: 9.5 MG/DL
CANCER AG19-9 SERPL-ACNC: 34 U/ML
CHLORIDE SERPL-SCNC: 101 MMOL/L
CO2 SERPL-SCNC: 28 MMOL/L
CREAT SERPL-MCNC: 0.97 MG/DL
EGFR: 92 ML/MIN/1.73M2
EOSINOPHIL # BLD AUTO: 0.07 K/UL
EOSINOPHIL NFR BLD AUTO: 1.3 %
ESTIMATED AVERAGE GLUCOSE: 105 MG/DL
GLUCOSE SERPL-MCNC: 96 MG/DL
HBA1C MFR BLD HPLC: 5.3 %
HCT VFR BLD CALC: 44.5 %
HGB BLD-MCNC: 14.5 G/DL
IMM GRANULOCYTES NFR BLD AUTO: 0.4 %
INR PPP: 1.12 RATIO
LYMPHOCYTES # BLD AUTO: 1.52 K/UL
LYMPHOCYTES NFR BLD AUTO: 27.5 %
MAN DIFF?: NORMAL
MCHC RBC-ENTMCNC: 30.4 PG
MCHC RBC-ENTMCNC: 32.6 GM/DL
MCV RBC AUTO: 93.3 FL
MONOCYTES # BLD AUTO: 0.39 K/UL
MONOCYTES NFR BLD AUTO: 7.1 %
NEUTROPHILS # BLD AUTO: 3.5 K/UL
NEUTROPHILS NFR BLD AUTO: 63.2 %
PLATELET # BLD AUTO: 90 K/UL
POTASSIUM SERPL-SCNC: 4.3 MMOL/L
PROT SERPL-MCNC: 7.4 G/DL
PT BLD: 12.6 SEC
RBC # BLD: 4.77 M/UL
RBC # FLD: 15 %
SODIUM SERPL-SCNC: 141 MMOL/L
WBC # FLD AUTO: 5.53 K/UL

## 2024-01-12 RX ORDER — POLYETHYLENE GLYCOL 3350 AND ELECTROLYTES WITH LEMON FLAVOR 236; 22.74; 6.74; 5.86; 2.97 G/4L; G/4L; G/4L; G/4L; G/4L
236 POWDER, FOR SOLUTION ORAL
Qty: 1 | Refills: 0 | Status: COMPLETED | COMMUNITY
Start: 2023-05-31 | End: 2024-01-12

## 2024-01-19 ENCOUNTER — APPOINTMENT (OUTPATIENT)
Dept: PAIN MANAGEMENT | Facility: CLINIC | Age: 54
End: 2024-01-19
Payer: COMMERCIAL

## 2024-01-19 VITALS
HEIGHT: 76 IN | SYSTOLIC BLOOD PRESSURE: 137 MMHG | BODY MASS INDEX: 31.66 KG/M2 | DIASTOLIC BLOOD PRESSURE: 73 MMHG | HEART RATE: 70 BPM | WEIGHT: 260 LBS

## 2024-01-19 PROCEDURE — 99213 OFFICE O/P EST LOW 20 MIN: CPT

## 2024-01-19 NOTE — HISTORY OF PRESENT ILLNESS
[FreeTextEntry1] : Follow up for migraine.  Discussed pain management goals with ARTEM Figueredo . Has taken 3 doses. Pt does report a decrease in migraine frequency and severity and tolerates it well. Denies any adverse effects.  Denies any new migraine issues.  [Chronic Headache] : chronic headache [Nausea] : nausea [Photophobia] : photophobia [Phonophobia] : phonophobia [Neck Pain] : neck pain [Scalp Tenderness] : scalp tenderness [> 15 days per month] : > 15 days per month [Worsened] : The patient reports ~his/her~ symptoms since the last visit have worsened

## 2024-01-19 NOTE — PHYSICAL EXAM
[General Appearance - Alert] : alert [General Appearance - Well Nourished] : well nourished [General Appearance - Well Developed] : well developed [Oriented To Time, Place, And Person] : oriented to person, place, and time [Impaired Insight] : insight and judgment were intact [Affect] : the affect was normal [Memory Recent] : recent memory was not impaired [Memory Remote] : remote memory was not impaired [Person] : oriented to person [Place] : oriented to place [Time] : oriented to time [Short Term Intact] : short term memory intact [Remote Intact] : remote memory intact [Registration Intact] : recent registration memory intact [Concentration Intact] : normal concentrating ability [Visual Intact] : visual attention was ~T not ~L decreased [Fluency] : fluency intact [Comprehension] : comprehension intact [Current Events] : adequate knowledge of current events [Past History] : adequate knowledge of personal past history [Vocabulary] : adequate range of vocabulary [Cranial Nerves Oculomotor (III)] : extraocular motion intact [Cranial Nerves Facial (VII)] : face symmetrical [Cranial Nerves Vestibulocochlear (VIII)] : hearing was intact bilaterally [Cranial Nerves Accessory (XI - Cranial And Spinal)] : head turning and shoulder shrug symmetric [Cranial Nerves Hypoglossal (XII)] : there was no tongue deviation with protrusion [No Muscle Atrophy] : normal bulk in all four extremities [Motor Handedness Right-Handed] : the patient is right hand dominant [Motor Strength Upper Extremities Bilaterally] : strength was normal in both upper extremities [Sensation Tactile Decrease] : light touch was intact [Allodynia] : no ~T allodynia present [Tremor] : no tremor present [Dysdiadochokinesia Bilaterally] : not present [Sclera] : the sclera and conjunctiva were normal [No MAKAYLA] : no internuclear ophthalmoplegia [Strabismus] : no strabismus was seen [Outer Ear] : the ears and nose were normal in appearance [Hearing Threshold Finger Rub Not [place]] : hearing was normal [Neck Appearance] : the appearance of the neck was normal [Neck Cervical Mass (___cm)] : no neck mass was observed [Exaggerated Use Of Accessory Muscles For Inspiration] : no accessory muscle use [Edema] : there was no peripheral edema [Nail Clubbing] : no clubbing  or cyanosis of the fingernails [Skin Color & Pigmentation] : normal skin color and pigmentation [Involuntary Movements] : no involuntary movements were seen [] : no rash

## 2024-01-19 NOTE — REVIEW OF SYSTEMS
[Fever] : no fever [Feeling Poorly] : feeling poorly [Feeling Tired] : feeling tired [Eyesight Problems] : no eyesight problems [Nasal Discharge] : no nasal discharge [Chest Pain] : no chest pain [Cough] : no cough [Constipation] : no constipation [Arthralgias] : arthralgias [Joint Swelling] : joint swelling [Joint Stiffness] : joint stiffness [Neck Pain] : neck pain [Back Pain] : ~T back pain [Skin Lesions] : no skin lesions [Itching] : no itching [Dizziness] : no dizziness [Muscle Weakness] : no muscle weakness [Swollen Glands] : no swollen glands

## 2024-01-19 NOTE — ASSESSMENT
[FreeTextEntry1] : Chronic migraine headaches Ajovy seems to be decreasing migraine frequency and severity. RTO 3 months

## 2024-02-19 ENCOUNTER — NON-APPOINTMENT (OUTPATIENT)
Age: 54
End: 2024-02-19

## 2024-03-12 DIAGNOSIS — E34.9 ENDOCRINE DISORDER, UNSPECIFIED: ICD-10-CM

## 2024-03-29 ENCOUNTER — RX RENEWAL (OUTPATIENT)
Age: 54
End: 2024-03-29

## 2024-03-29 RX ORDER — RIFAXIMIN 550 MG/1
550 TABLET ORAL
Qty: 60 | Refills: 3 | Status: ACTIVE | COMMUNITY
Start: 1900-01-01 | End: 1900-01-01

## 2024-04-09 LAB
HCT VFR BLD CALC: 41 %
HGB BLD-MCNC: 14.2 G/DL
TESTOST FREE SERPL-MCNC: 1.1 PG/ML
TESTOST SERPL-MCNC: 100 NG/DL

## 2024-04-29 RX ORDER — TIRZEPATIDE 15 MG/.5ML
15 INJECTION, SOLUTION SUBCUTANEOUS
Qty: 4 | Refills: 2 | Status: ACTIVE | COMMUNITY
Start: 2023-12-08 | End: 1900-01-01

## 2024-05-06 RX ORDER — FREMANEZUMAB-VFRM 225 MG/1.5ML
225 INJECTION SUBCUTANEOUS
Qty: 1 | Refills: 5 | Status: ACTIVE | COMMUNITY
Start: 2023-10-19 | End: 1900-01-01

## 2024-05-06 RX ORDER — GABAPENTIN 600 MG/1
600 TABLET, COATED ORAL
Qty: 90 | Refills: 2 | Status: ACTIVE | COMMUNITY
Start: 2023-10-19 | End: 1900-01-01

## 2024-05-13 ENCOUNTER — NON-APPOINTMENT (OUTPATIENT)
Age: 54
End: 2024-05-13

## 2024-05-28 ENCOUNTER — APPOINTMENT (OUTPATIENT)
Dept: HEPATOLOGY | Facility: CLINIC | Age: 54
End: 2024-05-28
Payer: COMMERCIAL

## 2024-05-28 VITALS
BODY MASS INDEX: 31.66 KG/M2 | OXYGEN SATURATION: 99 % | DIASTOLIC BLOOD PRESSURE: 77 MMHG | TEMPERATURE: 97.8 F | WEIGHT: 260 LBS | SYSTOLIC BLOOD PRESSURE: 149 MMHG | HEIGHT: 76 IN | HEART RATE: 78 BPM

## 2024-05-28 DIAGNOSIS — K74.60 UNSPECIFIED CIRRHOSIS OF LIVER: ICD-10-CM

## 2024-05-28 PROCEDURE — 99215 OFFICE O/P EST HI 40 MIN: CPT

## 2024-05-28 NOTE — ASSESSMENT
[FreeTextEntry1] : 52 y/o male with compensated cirrhosis 2/2 ETOH + fatty liver r/t metabolic syndrome, here for follow-up.  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 + Fatty liver r/t metabolic syndrome, with HE, MELD 9 - Euvolemic on exam, no indication for diuretics - C/w daily weights and report > 2-3 lb/day weight gain; continue zepbound - HE: improved sx, c/w Xifaxan 550 mg BID; with bloating from lactulose and likely zepbound; will decrease lactulose to once a day and increase miralax to 4 x day - Reinforced he is not allowed to drive or operate heavy machinery given dx of HE - Pt will f/u to get CPAP mask fixed - pHTN: 9/29/22 EGD w/o EV or portal HTN gastropathy, no indication for BB as primary ppx; will do fibroscan for baveno 7 px continue etoh abstinence - HCC: 12/11/23 MR/MRCP w/o c/f malignancy and patent vasculature; update tumor markers and maintain biannual screening; will get US liver - Counseled on importance of maintaining high protein and low Na diet - Extensively counseled on importance of optimizing metabolic profile with intentional weight loss > maintain f/u with Weight management and continued dose titration of Zepbound for optimization of metabolic profile - Reinforced importance of absolute ETOH abstinence and risk for liver decompensation with relapse - C/w PETH screening and f/u with addiction counselor - Advised to avoid nephrotoxic agents - Reinforced s/s of liver decompensation and advised to report immediately - No indication for liver txp evaluation given well compensated disease and low MELD; will continue following clinical trajectory and refer if appropriate - will review imaging with radiology to see if pt has had imaging characteristics for chronic liver disease, predating the liver bx in 4/22 showing cirrhosis- to help with his disability case   Update labs today. RTC in 4-5 months. I spent total of 40 minutes on this patient encounter.

## 2024-05-28 NOTE — HISTORY OF PRESENT ILLNESS
[de-identified] :  52 y/o male with a PMH of HTN, Pre-T2DM, Obesity, and ETOH Cirrhosis, here for follow-up. c/o bloating, constipation (moves bm q 2days); c/o cramping abd pain; lactulose bid and miralax bid not helping; HE under control; no etoh; no volume overload issues trying to get disability; needs to prove that liver disease is chronic last mri liver ' neg for hcc on Zepbound 15mg ( down to 252lbs)  States not eating much  MRI 2023- IMPRESSION: Cirrhosis with portal hypertension.  No lesions meeting criteria for HCC.  No evidence of portal vein thrombus. Patient:   ARTEM SIMS   Accession:                             [id number]  Collected Date/Time:                   2022 14:03 EDT Received Date/Time:                    2022 14:43 EDT  Addendum Report - Auth (Verified)  Addendum Comment: Case discussed with Dr. LYNDON De La Vega, 22.  Verified by: MARCO GRANT M.D. (Electronic Signature) Reported on: 22 10:27 EDT, [facility] Phone: [phone]   Fax: [phone] _________________________________________________________________  Surgical Pathology Report - Auth (Verified) Specimen(s) Submitted 1  Liver biopsy  Final Diagnosis   Liver, needle biopsy: - Liver with extensive fibrosis, moderate steatosis and triaditis (see comment). - No definitive evidence of carcinoma. Verified by: MARCO GRANT M.D. (Electronic Signature) Reported on: 22 14:26 EDT, [facility] Phone: [phone]   Fax: [phone] _________________________________________________________________   Comment Histologic sections reveal a distorted hepatic architecture with extensive pericellular fibrosis, bridging fibrosis and nodule formation (Trichrome stain). There is moderate macrovesicular steatosis (approximately 40 %).  The portal zones show a lymphoplasmacytic inflammatory infiltrate with breaching of the limiting plate and bile ductular proliferation with focal bile stasis. The hepatocytes show areas of ballooning degeneration and Shonda bodies/hyalin. No definitive necrosis is present. No iron (Iron stain) is present. The findings are compatible with Grade 2 Steatohepatitis, Stage 4 fibrosis/probable or definitive Cirrhosis.  Previous Note Prior HPI: Patient established care with us after referral from Dr. Damon for evaluation of his liver disease. Decompensation with HE and ascites s/p LVP (no hx of SBP). Underwent a liver bx on 22 that was pertinent for Grade 2 Steatohepatitis with cirrhosis while hospitalized for episode of decompensation. No additional episodes of decompensation since 2022. No hx of EVH. Uncle  of ETOH Cirrhosis. Longstanding hx of obesity with heaviest weight ~ 300 lbs and has since lost 100 lbs with dieting and exercise. Started ETOH use at age 16 and started drinking regularly (~3x/week with 5-6 drinks/night) at age 24. About 7 years ago patient was unemployed and started drinking ~1 pint of liquor/night. Last drink on 4/15/22 prior to hospitalization. S/p RPP and engaged with mental health/addiction counselor. No IVDU. 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 Lives with wife and children at home. Independent in ADLs/iADLs.  In the interim since last visit, there have been no interim illnesses or hospitalizations. No episodes of liver decompensation. Established care with Weight management and has been started on Zepbound to aid with weight loss and optimization of metabolic profile. Still following with pain management for tx of chronic neuropathic pain. Has not gotten CPAP mask fixed and still c/o intermittent 'brain fog' -- of note, 23 MRI Abd w/o PVT as potential source. Pt also reports improved bowel regimen, however, still struggling with intermittent constipation and epigastric discomfort. Patient's allergies, medications, past medical, surgical, family, and social histories were reviewed and updated as appropriate. Seen in clinic today, reports that he is in baseline state of health with persistent upper/lower extremity neuropathic pain. Denies any recent fevers, chills, cough, lightheadedness, AMS, abdominal pain, n/v, diarrhea, hematochezia, hematemesis, and melena. Denies alcohol, tobacco, or recreational drug use. ~10 lb weight loss since last visit (intentional) and tolerating Zepbound well -- currently 260 lbs.  MELD 9

## 2024-05-28 NOTE — PHYSICAL EXAM
[General Appearance - Alert] : alert [] : no respiratory distress [Respiration, Rhythm And Depth] : normal respiratory rhythm and effort [Auscultation Breath Sounds / Voice Sounds] : lungs were clear to auscultation bilaterally [Edema] : there was no peripheral edema [Bowel Sounds] : normal bowel sounds [Abdomen Soft] : soft [Abdomen Tenderness] : non-tender [FreeTextEntry1] : + tympanic to percussion [Oriented To Time, Place, And Person] : oriented to person, place, and time

## 2024-05-29 ENCOUNTER — NON-APPOINTMENT (OUTPATIENT)
Age: 54
End: 2024-05-29

## 2024-05-29 LAB
AFP-TM SERPL-MCNC: 1.9 NG/ML
ALBUMIN SERPL ELPH-MCNC: 4.6 G/DL
ALP BLD-CCNC: 55 U/L
ALT SERPL-CCNC: 23 U/L
ANION GAP SERPL CALC-SCNC: 13 MMOL/L
AST SERPL-CCNC: 22 U/L
BILIRUB SERPL-MCNC: 0.9 MG/DL
BUN SERPL-MCNC: 12 MG/DL
CALCIUM SERPL-MCNC: 9.4 MG/DL
CANCER AG19-9 SERPL-ACNC: 41 U/ML
CHLORIDE SERPL-SCNC: 102 MMOL/L
CO2 SERPL-SCNC: 24 MMOL/L
CREAT SERPL-MCNC: 0.94 MG/DL
EGFR: 96 ML/MIN/1.73M2
GLUCOSE SERPL-MCNC: 87 MG/DL
INR PPP: 1.05 RATIO
POTASSIUM SERPL-SCNC: 4.2 MMOL/L
PROT SERPL-MCNC: 7.1 G/DL
PT BLD: 11.9 SEC
SODIUM SERPL-SCNC: 140 MMOL/L

## 2024-05-30 LAB
ESTIMATED AVERAGE GLUCOSE: 100 MG/DL
HBA1C MFR BLD HPLC: 5.1 %
HCT VFR BLD CALC: 38.4 %
HGB BLD-MCNC: 12.9 G/DL
MCHC RBC-ENTMCNC: 30.4 PG
MCHC RBC-ENTMCNC: 33.6 GM/DL
MCV RBC AUTO: 90.4 FL
PLATELET # BLD AUTO: 82 K/UL
RBC # BLD: 4.25 M/UL
RBC # FLD: 15 %
WBC # FLD AUTO: 4.78 K/UL

## 2024-06-04 RX ORDER — LACTULOSE 10 G/15ML
10 SOLUTION ORAL
Qty: 6 | Refills: 3 | Status: ACTIVE | COMMUNITY
Start: 2023-10-03 | End: 1900-01-01

## 2024-06-04 NOTE — ED ADULT NURSE NOTE - NS_SISCREENINGSR_GEN_ALL_ED
Jacqueline M Goldberg was seen today in the clinic for an audiologic evaluation.  Patient's main complaint was decreased hearing in both ears.  Mrs. Goldberg reported she has also had some falls that have been taking place over the last few months. She denied any lightheaded or room spinning sensations but reported that she feels like she is drunk when walking and will be fine until she is on the floor. Mrs. Goldberg reported that she has fallen twice in May and multiple times in the last 6 months with head injury. She also reported that she does not have vision in her left eye and especially struggles with depth perception.     Otoscopy revealed clear EAC's with visible TM's in both ears.    Tympanometry revealed Type B in the right ear and Type A in the left ear.     Audiogram results revealed moderate to severe sensorineural hearing loss (SNHL) in the right ear and a mild to moderate SNHL in the left ear.      Speech reception thresholds were noted at 55 dB in the right ear and 45 dB in the left ear.    Speech discrimination scores were 96% in the right ear and 84% in the left ear.    Results were reviewed with patient following testing. It was recommended patient is seen for a hearing aid consultation and follow-up with ENT as needed. Patient is scheduled for a hearing aid consultation on 6/11/24.    Recommendations:  Otologic evaluation  Annual audiogram  Hearing protection when in noise  Hearing aid consultation         Negative

## 2024-06-10 RX ORDER — TESTOSTERONE CYPIONATE 200 MG/ML
200 INJECTION, SOLUTION INTRAMUSCULAR
Qty: 20 | Refills: 0 | Status: ACTIVE | COMMUNITY
Start: 2018-06-21 | End: 1900-01-01

## 2024-06-26 ENCOUNTER — NON-APPOINTMENT (OUTPATIENT)
Age: 54
End: 2024-06-26

## 2024-07-16 ENCOUNTER — RX RENEWAL (OUTPATIENT)
Age: 54
End: 2024-07-16

## 2024-07-31 ENCOUNTER — APPOINTMENT (OUTPATIENT)
Dept: SURGERY | Facility: CLINIC | Age: 54
End: 2024-07-31
Payer: COMMERCIAL

## 2024-07-31 VITALS — HEIGHT: 76 IN | BODY MASS INDEX: 30.44 KG/M2 | WEIGHT: 250 LBS

## 2024-07-31 PROCEDURE — 99213 OFFICE O/P EST LOW 20 MIN: CPT

## 2024-07-31 NOTE — HISTORY OF PRESENT ILLNESS
[Home] : at home, [unfilled] , at the time of the visit. [Medical Office: ([facility])___] : at the medical office located in  [Verbal consent obtained from patient] : the patient, [unfilled] [FreeTextEntry1] : Aman is a 53-year-old male who presented for initial obesity medicine consultation. Patient had an initial  BMI of 32.87. He has a history of liver cirrhosis secondary to alcohol use. Patient is currently sober however states he is having a difficult time losing weight. He would like to lose about 40 to 50 pounds. Patient is on Concerta for ADHD and would not be an appropriate candidate for phentermine.   Patient was prescribed Zepp bound and presents for follow-up today.  Has titrated up to a maximum dose of 15 mg.  He currently weighs 250 pounds which is 20 pounds less than he was at our initial consultation.  He is losing weight however believes his weight loss is slow.  He reports exercising fairly regularly however would like to increase his physical activity as discussed in today's visit.  I recommended at least 150 minutes of both cardiovascular and resistance training methods per week.  Patient would like to continue Zepp bound for an additional 3 months until our next follow-up visit.  I will order new blood work and assess vitamin levels along with hemoglobin A1c, liver kidney and pancreatic function as well.

## 2024-07-31 NOTE — ASSESSMENT
[FreeTextEntry1] : In summary patient is a 53-year-old male that was seen back at the end of October 2023 for obesity medicine consultation.  He had no follow-up since but has been titrating Zepbound 15 mg dose with no reported adverse side effects.  Would like to continue this treatment for an additional 3 months until next visit in October.  Will order complete blood work and contact patient once resulted with any abnormalities.  As listed above discussed increasing his physical activity which would help him significantly in terms of weight loss.  Recommended patient see Norma Morrell registered dietitian, however patient declines at this time.

## 2024-07-31 NOTE — HISTORY OF PRESENT ILLNESS
[Home] : at home, [unfilled] , at the time of the visit. [Medical Office: ([facility])___] : at the medical office located in  [Verbal consent obtained from patient] : the patient, [unfilled] [FreeTextEntry1] : Aman is a 53-year-old male who presented for initial obesity medicine consultation. Patient had an initial  BMI of 32.87. He has a history of liver cirrhosis secondary to alcohol use. Patient is currently sober however states he is having a difficult time losing weight. He would like to lose about 40 to 50 pounds. Patient is on Concerta for ADHD and would not be an appropriate candidate for phentermine.   Patient was prescribed Zepp bound and presents for follow-up today.  Has titrated up to a maximum dose of 15 mg.  He currently weighs 250 pounds which is 20 pounds less than he was at our initial consultation.  He is losing weight however believes his weight loss is slow.  He reports exercising fairly regularly however would like to increase his physical activity as discussed in today's visit.  I recommended at least 150 minutes of both cardiovascular and resistance training methods per week.  Patient would like to continue Zepp bound for an additional 3 months until our next follow-up visit.  I will order new blood work and assess vitamin levels along with hemoglobin A1c, liver kidney and pancreatic function as well.

## 2024-08-22 ENCOUNTER — APPOINTMENT (OUTPATIENT)
Dept: UROLOGY | Facility: CLINIC | Age: 54
End: 2024-08-22
Payer: COMMERCIAL

## 2024-08-22 ENCOUNTER — NON-APPOINTMENT (OUTPATIENT)
Age: 54
End: 2024-08-22

## 2024-08-22 DIAGNOSIS — N52.9 MALE ERECTILE DYSFUNCTION, UNSPECIFIED: ICD-10-CM

## 2024-08-22 DIAGNOSIS — E34.9 ENDOCRINE DISORDER, UNSPECIFIED: ICD-10-CM

## 2024-08-22 PROCEDURE — 99490 CHRNC CARE MGMT STAFF 1ST 20: CPT

## 2024-08-22 PROCEDURE — 99213 OFFICE O/P EST LOW 20 MIN: CPT

## 2024-08-22 RX ORDER — TADALAFIL 20 MG/1
20 TABLET ORAL
Qty: 6 | Refills: 11 | Status: ACTIVE | COMMUNITY
Start: 2024-08-22 | End: 1900-01-01

## 2024-08-23 NOTE — HISTORY OF PRESENT ILLNESS
[FreeTextEntry1] : 48 yo M with Low Testosterone seen 3/2018 with labs (2/2018) Total 281, free 3.4. He reports depression, low libido and fatigue. It is associated with erectile dysfunction. Of note, pt was found to have elevated LFTs. Dr Damon gave clearance for TRT. He also reports mild LUTS with hesitancy. He was started on TRT and found 300 mg IM q2 weeks to control his symptoms.   1/3/19: Patient presents for follow up. He has been administering TRT injections without complaint. Good control of his hypogonadism symptoms. LUTS not worse or bothersome. No hematuria, no dysuria, no frequency, no urgency. No incontinence. No fevers, no chills, no nausea, no vomiting, no flank pain.  He had been scheduled for labs prior to this visit, but he has not yet obtained them.   06/25/2020: Patient presents for follow up. He has not been seen since 1/2019. In that time he stopped administering TRT and developed gynecomastia. He reports fatigue, low libido. No new  symptoms and other medical  issues remain unchanged from above. No hematuria, no dysuria, no frequency, no urgency, no hesitancy, no straining. No incontinence. No fevers, no chills, no nausea, no vomiting, no flank pain.   02/03/2022: Patient presents for follow up. He reports perineal pain radiating to penis. Mild dysuria. No other acute complaints. He does report ED, and asking for renewal of tadalafil.   03/18/2022: Patient presents for follow up. He reports lower extremity rash and swelling, he atrributes to abx. He reports continued perineal pain, dribbling, dysuria. Rash on penis.   08/25/2022: Patient presents for follow up. He reports his symptoms of dysuria and pelvic pressure are not improving despite antibiotics and UA/UCx prior to his starting abx are noted to be negative. Patient reports he would also like to restart his testosterone replacement therapy because his testosterone is 100 and he feels low libido and tiredness. He also states his hepatologist is monitoring him and suggested he can restart his TRT. No hematuria, No fevers, no chills, no nausea, no vomiting, no flank pain.  01/03/2023: Patient presents for follow up. He reports improvement of energy and mood with resumption of TRT. Feels better but does think his symptoms could be better controlled. Labs reviewed, Hb 13, psa 0.1, serum T 470. He felt improvement of pelvic pain with D10, but feels overall pain is resolved on its own.   05/04/2023: Patient presents for follow up. He reports improvement in energy and mood after increasing his TRT to 200 mg Q weekly. Labs reviewed Hb 13.6, PSA 0.54 and serum T 400. Pt is also requesting refill for D10 as he uses it for occasional PFD flare ups. Denies any new urinary symptoms today.   08/24/2023: Patient presents for follow up. He reports improvement in energy and mood after increasing his TRT to 200 mg Q weekly initially but feels low energy recently and feels increasing the dose would be helpful. He also reports he is exercising and focusing on losing weight for his overall health.  Labs reviewed Hb 13.9. Did not have recent testosterone drawn. Reports PFD has been stable and requiring D10 frequently. No new urinary complaints.   08/22/2024: Patient presents for follow up. He reports continued good control of fatigue and low libido with TRT. No pelvic or perineal pain. Reports ED with poor tumescence and rigidity. no other new complaints.

## 2024-08-23 NOTE — DISCUSSION/SUMMARY
[FreeTextEntry1] : Administered testosterone cypionate 300 mg into right buttock.\par  \par  Lot [id number]\par  Exp 01/31/2025\par  \par  Pt verbalized plan to p/u testo and supplies from pharmacy as ordered and resume self-adminstration.  Reports he is independent with task and declined teaching.

## 2024-08-23 NOTE — DISCUSSION/SUMMARY
[FreeTextEntry1] : Administered testosterone cypionate 300 mg into right buttock.\par  \par  Lot [id number]\par  Exp 01/31/2025\par  \par  Pt verbalized plan to p/u testo and supplies from pharmacy as ordered and resume self-adminstration.  Reports he is independent with task and declined teaching.

## 2024-08-23 NOTE — ASSESSMENT
[FreeTextEntry1] : 52 yo M with PFD and low testosterone now with some low energy requesting dose increase.  For Low testosterone, will continue TRT. Surveillance labs now.  For PFD, discussed PFPT but pt declines and prefers to use D10 PRN flare ups.  For ED, Rx for tadalafil 20 mg PRN sex

## 2024-08-23 NOTE — ASSESSMENT
[FreeTextEntry1] : 54 yo M with PFD and low testosterone now with some low energy requesting dose increase.  For Low testosterone, will continue TRT. Surveillance labs now.  For PFD, discussed PFPT but pt declines and prefers to use D10 PRN flare ups.  For ED, Rx for tadalafil 20 mg PRN sex

## 2024-08-24 LAB
HCT VFR BLD CALC: 43.2 %
HGB BLD-MCNC: 13.8 G/DL
MCHC RBC-ENTMCNC: 31.1 PG
MCHC RBC-ENTMCNC: 31.9 GM/DL
MCV RBC AUTO: 97.3 FL
PLATELET # BLD AUTO: 93 K/UL
PSA SERPL-MCNC: 0.17 NG/ML
RBC # BLD: 4.44 M/UL
RBC # FLD: 14.7 %
TESTOST FREE SERPL-MCNC: 16.6 PG/ML
TESTOST SERPL-MCNC: 918 NG/DL
WBC # FLD AUTO: 6.63 K/UL

## 2024-10-22 ENCOUNTER — APPOINTMENT (OUTPATIENT)
Dept: HEPATOLOGY | Facility: CLINIC | Age: 54
End: 2024-10-22
Payer: COMMERCIAL

## 2024-10-22 VITALS
SYSTOLIC BLOOD PRESSURE: 151 MMHG | DIASTOLIC BLOOD PRESSURE: 82 MMHG | WEIGHT: 258 LBS | HEART RATE: 74 BPM | OXYGEN SATURATION: 98 % | BODY MASS INDEX: 31.42 KG/M2 | HEIGHT: 76 IN | TEMPERATURE: 96.3 F | RESPIRATION RATE: 16 BRPM

## 2024-10-22 DIAGNOSIS — K74.60 UNSPECIFIED CIRRHOSIS OF LIVER: ICD-10-CM

## 2024-10-22 DIAGNOSIS — K76.82 HEPATIC ENCEPHALOPATHY: ICD-10-CM

## 2024-10-22 PROCEDURE — 99215 OFFICE O/P EST HI 40 MIN: CPT

## 2024-10-22 RX ORDER — LACTULOSE 10 G/15ML
10 SOLUTION ORAL
Qty: 2 | Refills: 6 | Status: ACTIVE | COMMUNITY
Start: 2024-10-22 | End: 1900-01-01

## 2024-10-23 ENCOUNTER — NON-APPOINTMENT (OUTPATIENT)
Age: 54
End: 2024-10-23

## 2024-10-23 LAB
AFP-TM SERPL-MCNC: 1.9 NG/ML
ALBUMIN SERPL ELPH-MCNC: 4.7 G/DL
ALP BLD-CCNC: 54 U/L
ALT SERPL-CCNC: 21 U/L
ANION GAP SERPL CALC-SCNC: 11 MMOL/L
AST SERPL-CCNC: 25 U/L
BILIRUB SERPL-MCNC: 1.1 MG/DL
BUN SERPL-MCNC: 15 MG/DL
CALCIUM SERPL-MCNC: 9.5 MG/DL
CHLORIDE SERPL-SCNC: 98 MMOL/L
CO2 SERPL-SCNC: 26 MMOL/L
CREAT SERPL-MCNC: 1.01 MG/DL
EGFR: 87 ML/MIN/1.73M2
GLUCOSE SERPL-MCNC: 107 MG/DL
HCT VFR BLD CALC: 42.4 %
HGB BLD-MCNC: 14 G/DL
INR PPP: 1.07 RATIO
MCHC RBC-ENTMCNC: 30.8 PG
MCHC RBC-ENTMCNC: 33 GM/DL
MCV RBC AUTO: 93.4 FL
PLATELET # BLD AUTO: 91 K/UL
POTASSIUM SERPL-SCNC: 4.4 MMOL/L
PROT SERPL-MCNC: 7.4 G/DL
PT BLD: 12.7 SEC
RBC # BLD: 4.54 M/UL
RBC # FLD: 14.7 %
SODIUM SERPL-SCNC: 135 MMOL/L
WBC # FLD AUTO: 5.17 K/UL

## 2024-11-18 ENCOUNTER — TRANSCRIPTION ENCOUNTER (OUTPATIENT)
Age: 54
End: 2024-11-18

## 2024-11-25 NOTE — ED PROVIDER NOTE - CLINICAL SUMMARY MEDICAL DECISION MAKING FREE TEXT BOX
No
48 y/o male with hx of abd mesh presents to ED with abd pain onset three hours ago will obtain ct, blood work, reassess. r/o kidney stone, less likely diverticulitis.

## 2024-11-26 ENCOUNTER — OUTPATIENT (OUTPATIENT)
Dept: OUTPATIENT SERVICES | Facility: HOSPITAL | Age: 54
LOS: 1 days | End: 2024-11-26
Payer: COMMERCIAL

## 2024-11-26 ENCOUNTER — APPOINTMENT (OUTPATIENT)
Dept: MRI IMAGING | Facility: CLINIC | Age: 54
End: 2024-11-26

## 2024-11-26 DIAGNOSIS — Z98.890 OTHER SPECIFIED POSTPROCEDURAL STATES: Chronic | ICD-10-CM

## 2024-11-26 DIAGNOSIS — Z96.651 PRESENCE OF RIGHT ARTIFICIAL KNEE JOINT: Chronic | ICD-10-CM

## 2024-11-26 DIAGNOSIS — Z98.1 ARTHRODESIS STATUS: Chronic | ICD-10-CM

## 2024-11-26 PROCEDURE — 74183 MRI ABD W/O CNTR FLWD CNTR: CPT | Mod: 26

## 2024-11-26 PROCEDURE — A9585: CPT

## 2024-11-26 PROCEDURE — 74183 MRI ABD W/O CNTR FLWD CNTR: CPT

## 2024-12-08 ENCOUNTER — EMERGENCY (EMERGENCY)
Facility: HOSPITAL | Age: 54
LOS: 1 days | Discharge: HOME | End: 2024-12-08
Attending: EMERGENCY MEDICINE | Admitting: EMERGENCY MEDICINE
Payer: COMMERCIAL

## 2024-12-08 VITALS
HEART RATE: 81 BPM | WEIGHT: 250 LBS | HEIGHT: 76 IN | TEMPERATURE: 98 F | SYSTOLIC BLOOD PRESSURE: 120 MMHG | DIASTOLIC BLOOD PRESSURE: 70 MMHG | RESPIRATION RATE: 16 BRPM | OXYGEN SATURATION: 97 %

## 2024-12-08 DIAGNOSIS — Z96.651 PRESENCE OF RIGHT ARTIFICIAL KNEE JOINT: Chronic | ICD-10-CM

## 2024-12-08 DIAGNOSIS — Z98.890 OTHER SPECIFIED POSTPROCEDURAL STATES: Chronic | ICD-10-CM

## 2024-12-08 DIAGNOSIS — Z98.1 ARTHRODESIS STATUS: Chronic | ICD-10-CM

## 2024-12-08 PROCEDURE — 99283 EMERGENCY DEPT VISIT LOW MDM: CPT | Mod: 25

## 2024-12-08 PROCEDURE — 96372 THER/PROPH/DIAG INJ SC/IM: CPT

## 2024-12-08 PROCEDURE — 90471 IMMUNIZATION ADMIN: CPT

## 2024-12-08 PROCEDURE — 99284 EMERGENCY DEPT VISIT MOD MDM: CPT

## 2024-12-08 PROCEDURE — 90377 RABIES IG HT&SOL HUMAN IM/SC: CPT

## 2024-12-08 PROCEDURE — 90675 RABIES VACCINE IM: CPT

## 2024-12-08 RX ORDER — RABIES IMMUNE GLOBULIN/THIMER 150 UNIT/1
2300 VIAL (ML) INTRAMUSCULAR ONCE
Refills: 0 | Status: COMPLETED | OUTPATIENT
Start: 2024-12-08 | End: 2024-12-08

## 2024-12-08 RX ADMIN — Medication 2300 UNIT(S): at 17:56

## 2024-12-08 RX ADMIN — Medication 1 MILLILITER(S): at 17:43

## 2024-12-08 NOTE — ED PROVIDER NOTE - NSFOLLOWUPINSTRUCTIONS_ED_ALL_ED_FT
Return for further vaccine doses on Dec 11th, 15th and 22nd.  [facility] regarding [facility].    Take antibiotic as prescribed.     Rabies Vaccine    WHAT YOU NEED TO KNOW:    What is the rabies vaccine? The rabies vaccine can prevent rabies. Rabies is a serious illness caused by a virus. The rabies virus is spread to humans through the bite or scratch of an infected animal. Dogs, bats, skunks, coyotes, raccoons, and foxes are examples of animals that can carry rabies. The rabies vaccine can protect you from infection if you are at high risk of exposure. The vaccine can also prevent infection after you are bitten by an infected animal.    When is the vaccine given? The rabies vaccine is not part of the usual vaccination schedule. Your healthcare provider will give you an injection schedule. You should receive a vaccine if you have a higher risk of exposure to rabies. This might include people who work with animals who may be infected with rabies. You should also receive the vaccine after being bitten or scratched by an infected animal. The following is a common dosing schedule:    Before possible exposure to the virus, the vaccine is given in 2 doses. The first dose can be given at any time. The second dose is given 7 days after the first. You may need a booster dose within 3 years of the first 2 doses.    After exposure to the virus, the vaccine is usually given in 2 or 4 doses:  If you have received the rabies vaccine in the past, you usually only need 2 doses. The first is given immediately and the second is given 3 days later.    If you have not received the rabies vaccine, you need 4 doses over 2 weeks. The first dose is given as soon as possible after exposure to rabies. The following doses are given on days 3, 7, and 14. A shot called rabies immune globulin is given at the same time as the first dose. This medicine helps your immune system fight the infection.  What should I do if I miss a dose or will miss a scheduled dose? Call your healthcare provider right away. He or she will tell you what to do. The best way to be protected is to stay on the injection schedule given to you. This is especially important if you are getting the vaccine after exposure to the rabies virus. Reschedule any makeup dose or upcoming dose for as close to the original appointment as possible. Remember that you cannot get more than 1 dose on any day.    Who should not get the rabies vaccine or should wait to get it? Your healthcare provider may have you wait if you have not been exposed to rabies but are at high risk. If you have been exposed, you need to get the vaccine as soon as possible. Tell the provider if:    You had an allergic reaction to the rabies vaccine in the past, or to another vaccine.    You have any allergies.    You have a weakened immune system.    You take chloroquine or a similar medicine.    You are sick or have a fever of 101°F (38.3°C) or higher.  What are the risks of the rabies vaccine? The injection area may become red, tender, or swollen. You may develop a headache or muscle aches. You may have nausea or pain in your abdomen. You may have an allergic reaction to the vaccine. This can be life-threatening.    Call your local emergency number (911 in the [place]) or have someone call if:    Your mouth and throat are swollen.    You are wheezing or have trouble breathing.    You have chest pain or your heart is beating faster than normal for you.    You feel like you are going to faint.  When should I seek immediate care?    Your face is red or swollen.    You have hives that spread over your body.    You feel weak or dizzy.  When should I call my doctor?    You have increased pain, redness, or swelling around the injection area.    You have a headache, muscle aches, or abdominal pain.    You have flu-like symptoms.    You have questions or concerns about the rabies vaccine.  CARE AGREEMENT:    You have the right to help plan your care. Learn about your health condition and how it may be treated. Discuss treatment options with your healthcare providers to decide what care you want to receive. You always have the right to refuse treatment.

## 2024-12-08 NOTE — ED PROVIDER NOTE - OBJECTIVE STATEMENT
54-year-old male with history of liver cirrhosis complaining of raccoon bite to right index finger after perching of a raccoon that was in the street in front of his house.  States he has a raccoon and captivity in his house.  Denies other injury or symptom.

## 2024-12-08 NOTE — ED PROVIDER NOTE - PROGRESS NOTE DETAILS
Since bite occurred in [place] which is at the [facility] of [facility] referred to [facility] who accepted information and improved rabies vaccination.  They will follow-up with patient regarding examination of animal if possible.

## 2024-12-08 NOTE — ED PROVIDER NOTE - PATIENT PORTAL LINK FT
You can access the FollowMyHealth Patient Portal offered by Health system by registering at the following website: [email or web address]. By joining [employer]’s FollowMyHealth portal, you will also be able to view your health information using other applications (apps) compatible with our system.

## 2024-12-08 NOTE — ED ADULT NURSE NOTE - CCCP TRG CHIEF CMPLNT
Pt arrives to triage c/o abdominal pain of sudden onset 3 hours PTA. Pt reports pain woke her from sleep. Pain 10/10 at this time, entire stomach L>R. Pt also repots nausea and diarrhea. Pt denies  sx, no cough/ cold sx.   
bite, animal

## 2024-12-08 NOTE — ED ADULT NURSE NOTE - OBJECTIVE STATEMENT
patient A&Ox3 in no acute distress c/o of raccoon bite earlier today R hand index , no other c/o at this time

## 2024-12-08 NOTE — ED ADULT TRIAGE NOTE - BANDS:
Problem: Pressure Injury, Risk for  Goal: # Skin remains intact  Outcome: Outcome Met, Continue evaluating goal progress toward completion    Patient seen in Clinic by: MAIK Veliz NP; RN assisted by: TL Farrell    Wound(s) debrided by provider: No Consent obtained/verified: MCKENNA    Wound care orders/updates: Yes- switch to nighat    Lab(s)/additional orders placed this visit: No    Patient education complete: Yes    Patient verbalized understanding of education/patient questions answered: Yes    AVS provided to patient/caregiver/facility: Yes               Allergy;

## 2024-12-08 NOTE — ED ADULT TRIAGE NOTE - MEANS OF ARRIVAL
03/23/24 1620   RT Protocol   History Pulmonary Disease 2   Respiratory pattern 0   Breath sounds 2   Cough 0   Indications for Bronchodilator Therapy None   Bronchodilator Assessment Score 4        ambulatory

## 2024-12-08 NOTE — ED ADULT NURSE REASSESSMENT NOTE - NS ED NURSE REASSESS COMMENT FT1
patient A&Ox3 in no acute distress discharge as orders refuses discharge vitals , aware of the dates that has to come back for follow up shots

## 2024-12-08 NOTE — ED ADULT NURSE NOTE - NSFALLUNIVINTERV_ED_ALL_ED
Bed/Stretcher in lowest position, wheels locked, appropriate side rails in place/Call bell, personal items and telephone in reach/Instruct patient to call for assistance before getting out of bed/chair/stretcher/Non-slip footwear applied when patient is off stretcher/[place] to call system/Physically safe environment - no spills, clutter or unnecessary equipment/Purposeful proactive rounding/Room/bathroom lighting operational, light cord in reach

## 2024-12-08 NOTE — ED PROVIDER NOTE - CLINICAL SUMMARY MEDICAL DECISION MAKING FREE TEXT BOX
54-year-old male with history of liver cirrhosis complaining of raccoon bite to right index finger after perching of a raccoon that was in the street in front of his house.  States he has a raccoon and captivity in his house.  Denies other injury or symptom.    Physical exam reveals bite amber to right index finger.  Impression raccoon bite  Plan will need rabies immune globulin and vaccine.  Will contact Department of Health for guidance.

## 2024-12-11 ENCOUNTER — APPOINTMENT (OUTPATIENT)
Dept: SURGERY | Facility: CLINIC | Age: 54
End: 2024-12-11
Payer: COMMERCIAL

## 2024-12-11 VITALS — WEIGHT: 250 LBS | HEIGHT: 76 IN | BODY MASS INDEX: 30.44 KG/M2

## 2024-12-11 PROCEDURE — 99213 OFFICE O/P EST LOW 20 MIN: CPT

## 2024-12-19 ENCOUNTER — APPOINTMENT (OUTPATIENT)
Dept: PAIN MANAGEMENT | Facility: CLINIC | Age: 54
End: 2024-12-19

## 2024-12-19 VITALS
WEIGHT: 250 LBS | TEMPERATURE: 98.2 F | HEIGHT: 76 IN | HEART RATE: 70 BPM | BODY MASS INDEX: 30.44 KG/M2 | SYSTOLIC BLOOD PRESSURE: 130 MMHG | DIASTOLIC BLOOD PRESSURE: 78 MMHG

## 2024-12-19 PROCEDURE — 99205 OFFICE O/P NEW HI 60 MIN: CPT

## 2024-12-23 ENCOUNTER — NON-APPOINTMENT (OUTPATIENT)
Age: 54
End: 2024-12-23

## 2025-02-25 NOTE — ED ADULT TRIAGE NOTE - NS ED NURSE BANDS TYPE
Current Provider Sania Giraldo NP  Procedure on Recall:Colonoscopy  Dx results and reommendations:f/u 2 yrs crohns    Last procedure date 02/16/23   Preforming provider Dr. Tu Caceres    Procedure removed from recall list:Yes    
Does patient have a Guardian or POA: No  Procedure: Colonoscopy  Diagnosis: Crohn's Disease: K50.90  Prep: Miralax    Previous scope: 2023  Allergies:  ALLERGIES:  Patient has no known allergies.    Medication Review:   Anticoagulants: This patient does not have an active medication from one of the medication groupers.   NSAIDS: No   Diabetic Medications: None  Phentermine: No   Antihypertensives: No   Iron Supplement:  No   Fish Oil:  No     Medical History Review:  Cardiac Stent Placement: No  Pacemaker NO  Defibrillator: No  Spinal Cord Stimulator: No   BMI over 40: No  Estimated body mass index is 33.3 kg/m² as calculated from the following:    Height as of 8/1/24: 5' 8\" (1.727 m).    Weight as of 8/1/24: 99.3 kg (219 lb).   Sleep Apnea: No     Family History: Colon Cancer  No    Colonoscopy scheduled per protocol and instructions reviewed, patient verbalized understanding. The patient has been instructed to hold certain medications prior to this procedure and to check with prescribing provider to make sure it would be ok to hold as stated in instructions listed in letter tab.    Procedure Date: 5/1/2025  Procedure Time: 7:30 am  Arrival Time: 645 am  Delivery Instructions: Via [employer] [facility] and Via Mail   has been confirmed: yes  Do you struggle from constipation: no  Referring provider: recall  Why are you having this procedure: na    Before Surgery Hold (Do Not Take) these Medications  Medications and Supplements:  - Morning of surgery hold any Vitamins AND for 2 weeks prior hold any Vitamin E or Herbals     Anticoagulation:none on med list    Antidiabetic:none on med list    KEITH Risk (Diuretics, ACE-I/ARB, NSAIDs):none on med list      Continue all other medications unless an alternative plan was advised with the surgeon and/or specialist.       
Recall Attempt # 1call to patient to schedule procedure. Patient unavailable left voicemail to call office back to schedule.     Procedure on Recall:Colonoscopy  Dx: crohns    Dr. Caceres/[facility]  
Name band;

## 2025-03-05 ENCOUNTER — APPOINTMENT (OUTPATIENT)
Dept: SURGERY | Facility: CLINIC | Age: 55
End: 2025-03-05
Payer: COMMERCIAL

## 2025-03-05 VITALS — BODY MASS INDEX: 31.05 KG/M2 | WEIGHT: 255 LBS | HEIGHT: 76 IN

## 2025-03-05 PROCEDURE — 99213 OFFICE O/P EST LOW 20 MIN: CPT | Mod: 95

## 2025-03-11 ENCOUNTER — LABORATORY RESULT (OUTPATIENT)
Age: 55
End: 2025-03-11

## 2025-03-27 ENCOUNTER — LABORATORY RESULT (OUTPATIENT)
Age: 55
End: 2025-03-27

## 2025-04-15 ENCOUNTER — OUTPATIENT (OUTPATIENT)
Dept: OUTPATIENT SERVICES | Facility: HOSPITAL | Age: 55
LOS: 1 days | Discharge: HOME | End: 2025-04-15
Payer: COMMERCIAL

## 2025-04-15 VITALS
TEMPERATURE: 98 F | SYSTOLIC BLOOD PRESSURE: 130 MMHG | WEIGHT: 255.96 LBS | HEART RATE: 78 BPM | HEIGHT: 75 IN | RESPIRATION RATE: 12 BRPM | OXYGEN SATURATION: 97 % | DIASTOLIC BLOOD PRESSURE: 77 MMHG

## 2025-04-15 DIAGNOSIS — Z96.651 PRESENCE OF RIGHT ARTIFICIAL KNEE JOINT: Chronic | ICD-10-CM

## 2025-04-15 DIAGNOSIS — K70.30 ALCOHOLIC CIRRHOSIS OF LIVER WITHOUT ASCITES: ICD-10-CM

## 2025-04-15 DIAGNOSIS — Z98.890 OTHER SPECIFIED POSTPROCEDURAL STATES: Chronic | ICD-10-CM

## 2025-04-15 DIAGNOSIS — Z98.1 ARTHRODESIS STATUS: Chronic | ICD-10-CM

## 2025-04-15 PROCEDURE — 88305 TISSUE EXAM BY PATHOLOGIST: CPT

## 2025-04-15 PROCEDURE — 88305 TISSUE EXAM BY PATHOLOGIST: CPT | Mod: 26

## 2025-04-15 PROCEDURE — 88312 SPECIAL STAINS GROUP 1: CPT

## 2025-04-15 PROCEDURE — 88312 SPECIAL STAINS GROUP 1: CPT | Mod: 26

## 2025-04-16 LAB — SURGICAL PATHOLOGY STUDY: SIGNIFICANT CHANGE UP

## 2025-04-17 DIAGNOSIS — I10 ESSENTIAL (PRIMARY) HYPERTENSION: ICD-10-CM

## 2025-04-17 DIAGNOSIS — Z87.891 PERSONAL HISTORY OF NICOTINE DEPENDENCE: ICD-10-CM

## 2025-04-17 DIAGNOSIS — K70.30 ALCOHOLIC CIRRHOSIS OF LIVER WITHOUT ASCITES: ICD-10-CM

## 2025-04-17 DIAGNOSIS — G47.33 OBSTRUCTIVE SLEEP APNEA (ADULT) (PEDIATRIC): ICD-10-CM

## 2025-04-17 DIAGNOSIS — Z88.0 ALLERGY STATUS TO PENICILLIN: ICD-10-CM

## 2025-04-17 DIAGNOSIS — K29.50 UNSPECIFIED CHRONIC GASTRITIS WITHOUT BLEEDING: ICD-10-CM

## 2025-04-17 DIAGNOSIS — K76.82 HEPATIC ENCEPHALOPATHY: ICD-10-CM

## 2025-04-22 ENCOUNTER — APPOINTMENT (OUTPATIENT)
Dept: HEPATOLOGY | Facility: CLINIC | Age: 55
End: 2025-04-22
Payer: COMMERCIAL

## 2025-04-22 VITALS
TEMPERATURE: 98 F | SYSTOLIC BLOOD PRESSURE: 125 MMHG | HEIGHT: 76 IN | HEART RATE: 78 BPM | BODY MASS INDEX: 31.29 KG/M2 | WEIGHT: 257 LBS | DIASTOLIC BLOOD PRESSURE: 72 MMHG | OXYGEN SATURATION: 98 %

## 2025-04-22 DIAGNOSIS — K76.0 FATTY (CHANGE OF) LIVER, NOT ELSEWHERE CLASSIFIED: ICD-10-CM

## 2025-04-22 DIAGNOSIS — M62.84 SARCOPENIA: ICD-10-CM

## 2025-04-22 DIAGNOSIS — K74.60 UNSPECIFIED CIRRHOSIS OF LIVER: ICD-10-CM

## 2025-04-22 PROCEDURE — 99215 OFFICE O/P EST HI 40 MIN: CPT

## 2025-04-25 ENCOUNTER — NON-APPOINTMENT (OUTPATIENT)
Age: 55
End: 2025-04-25

## 2025-04-25 LAB
ALBUMIN SERPL ELPH-MCNC: 4.3 G/DL
ALP BLD-CCNC: 63 U/L
ALT SERPL-CCNC: 16 U/L
ANION GAP SERPL CALC-SCNC: 16 MMOL/L
AST SERPL-CCNC: 24 U/L
BILIRUB SERPL-MCNC: 0.8 MG/DL
BUN SERPL-MCNC: 7 MG/DL
CALCIUM SERPL-MCNC: 9.2 MG/DL
CANCER AG19-9 SERPL-ACNC: 52 U/ML
CHLORIDE SERPL-SCNC: 102 MMOL/L
CO2 SERPL-SCNC: 22 MMOL/L
CREAT SERPL-MCNC: 0.92 MG/DL
EGFRCR SERPLBLD CKD-EPI 2021: 98 ML/MIN/1.73M2
GLUCOSE SERPL-MCNC: 97 MG/DL
HCT VFR BLD CALC: 40.4 %
HGB BLD-MCNC: 13.5 G/DL
INR PPP: 1.14 RATIO
MCHC RBC-ENTMCNC: 31.1 PG
MCHC RBC-ENTMCNC: 33.4 G/DL
MCV RBC AUTO: 93.1 FL
PLATELET # BLD AUTO: 87 K/UL
POTASSIUM SERPL-SCNC: 4.4 MMOL/L
PROT SERPL-MCNC: 6.9 G/DL
PT BLD: 13.6 SEC
RBC # BLD: 4.34 M/UL
RBC # FLD: 15.6 %
SODIUM SERPL-SCNC: 140 MMOL/L
WBC # FLD AUTO: 4.91 K/UL

## 2025-05-15 NOTE — DIETITIAN NUTRITION RISK NOTIFICATION - ADDITIONAL COMMENTS/DIETITIAN RECOMMENDATIONS
1) Encourage protein-rich foods, maximize food preferences. Give Premier protein shake once daily, 2) MVI w/ minerals daily to ensure 100% RDA met, 3) Consider adding thiamine 100 mg and folic acid daily 2/2 ETOH use/ malnutrition, 4) Monitor bowel movements, on lactulose. Encourage hydration/ fiber-rich foods if diarrhea occurs. RD will continue to monitor PO intake, labs, hydration, and wt prn.
13:35

## 2025-06-27 ENCOUNTER — APPOINTMENT (OUTPATIENT)
Dept: HEPATOLOGY | Facility: CLINIC | Age: 55
End: 2025-06-27
Payer: COMMERCIAL

## 2025-06-27 PROCEDURE — 76981 USE PARENCHYMA: CPT

## 2025-07-31 ENCOUNTER — APPOINTMENT (OUTPATIENT)
Dept: SURGERY | Facility: CLINIC | Age: 55
End: 2025-07-31
Payer: COMMERCIAL

## 2025-07-31 VITALS — BODY MASS INDEX: 30.44 KG/M2 | HEIGHT: 76 IN | WEIGHT: 250 LBS

## 2025-07-31 DIAGNOSIS — E66.9 OBESITY, UNSPECIFIED: ICD-10-CM

## 2025-07-31 DIAGNOSIS — Z86.39 PERSONAL HISTORY OF OTHER ENDOCRINE, NUTRITIONAL AND METABOLIC DISEASE: ICD-10-CM

## 2025-07-31 PROCEDURE — 99213 OFFICE O/P EST LOW 20 MIN: CPT | Mod: 95

## 2025-07-31 RX ORDER — SEMAGLUTIDE 1.7 MG/.75ML
1.7 INJECTION, SOLUTION SUBCUTANEOUS
Qty: 4 | Refills: 0 | Status: ACTIVE | COMMUNITY
Start: 2025-07-31 | End: 1900-01-01

## 2025-08-04 ENCOUNTER — RX RENEWAL (OUTPATIENT)
Age: 55
End: 2025-08-04

## 2025-08-12 ENCOUNTER — APPOINTMENT (OUTPATIENT)
Dept: HEPATOLOGY | Facility: CLINIC | Age: 55
End: 2025-08-12
Payer: COMMERCIAL

## 2025-08-12 VITALS
WEIGHT: 250 LBS | SYSTOLIC BLOOD PRESSURE: 134 MMHG | HEART RATE: 83 BPM | HEIGHT: 76 IN | BODY MASS INDEX: 30.44 KG/M2 | DIASTOLIC BLOOD PRESSURE: 76 MMHG | OXYGEN SATURATION: 98 % | TEMPERATURE: 97.8 F

## 2025-08-12 DIAGNOSIS — K76.82 HEPATIC ENCEPHALOPATHY: ICD-10-CM

## 2025-08-12 DIAGNOSIS — K74.60 UNSPECIFIED CIRRHOSIS OF LIVER: ICD-10-CM

## 2025-08-12 DIAGNOSIS — K76.6 PORTAL HYPERTENSION: ICD-10-CM

## 2025-08-12 PROCEDURE — 99215 OFFICE O/P EST HI 40 MIN: CPT

## 2025-08-12 RX ORDER — CARVEDILOL 3.12 MG/1
3.12 TABLET, FILM COATED ORAL
Qty: 60 | Refills: 6 | Status: ACTIVE | COMMUNITY
Start: 2025-08-12 | End: 1900-01-01

## 2025-08-20 ENCOUNTER — NON-APPOINTMENT (OUTPATIENT)
Age: 55
End: 2025-08-20

## 2025-08-20 LAB
AFP-TM SERPL-MCNC: <1.8 NG/ML
ALBUMIN SERPL ELPH-MCNC: 4.5 G/DL
ALP BLD-CCNC: 61 U/L
ALT SERPL-CCNC: 32 U/L
ANION GAP SERPL CALC-SCNC: 12 MMOL/L
AST SERPL-CCNC: 37 U/L
BILIRUB SERPL-MCNC: 1.3 MG/DL
BUN SERPL-MCNC: 10 MG/DL
CALCIUM SERPL-MCNC: 8.9 MG/DL
CHLORIDE SERPL-SCNC: 102 MMOL/L
CO2 SERPL-SCNC: 24 MMOL/L
CREAT SERPL-MCNC: 1.13 MG/DL
EGFRCR SERPLBLD CKD-EPI 2021: 76 ML/MIN/1.73M2
ESTIMATED AVERAGE GLUCOSE: 100 MG/DL
GLUCOSE SERPL-MCNC: 88 MG/DL
HBA1C MFR BLD HPLC: 5.1 %
HCT VFR BLD CALC: 44.2 %
HGB BLD-MCNC: 15.1 G/DL
INR PPP: 1.15 RATIO
MCHC RBC-ENTMCNC: 30.1 PG
MCHC RBC-ENTMCNC: 34.2 G/DL
MCV RBC AUTO: 88 FL
PETH 16:0/18:1: NEGATIVE NG/ML
PETH 16:0/18:2: NEGATIVE NG/ML
PETH COMMENTS: NORMAL
PLATELET # BLD AUTO: 87 K/UL
POTASSIUM SERPL-SCNC: 4.7 MMOL/L
PROT SERPL-MCNC: 7.1 G/DL
PT BLD: 13.5 SEC
RBC # BLD: 5.02 M/UL
RBC # FLD: 14 %
SODIUM SERPL-SCNC: 138 MMOL/L
WBC # FLD AUTO: 5.43 K/UL

## 2025-08-26 ENCOUNTER — NON-APPOINTMENT (OUTPATIENT)
Age: 55
End: 2025-08-26